# Patient Record
Sex: MALE | Race: WHITE | NOT HISPANIC OR LATINO | URBAN - METROPOLITAN AREA
[De-identification: names, ages, dates, MRNs, and addresses within clinical notes are randomized per-mention and may not be internally consistent; named-entity substitution may affect disease eponyms.]

---

## 2020-10-07 ENCOUNTER — INPATIENT (INPATIENT)
Facility: HOSPITAL | Age: 75
LOS: 12 days | Discharge: HOME | DRG: 683 | End: 2020-10-20
Attending: INTERNAL MEDICINE | Admitting: INTERNAL MEDICINE
Payer: COMMERCIAL

## 2020-10-07 VITALS
OXYGEN SATURATION: 96 % | DIASTOLIC BLOOD PRESSURE: 65 MMHG | TEMPERATURE: 98 F | HEART RATE: 110 BPM | SYSTOLIC BLOOD PRESSURE: 93 MMHG | RESPIRATION RATE: 24 BRPM | WEIGHT: 194.01 LBS

## 2020-10-07 DIAGNOSIS — I25.10 ATHEROSCLEROTIC HEART DISEASE OF NATIVE CORONARY ARTERY WITHOUT ANGINA PECTORIS: ICD-10-CM

## 2020-10-07 DIAGNOSIS — E78.5 HYPERLIPIDEMIA, UNSPECIFIED: ICD-10-CM

## 2020-10-07 DIAGNOSIS — I48.91 UNSPECIFIED ATRIAL FIBRILLATION: ICD-10-CM

## 2020-10-07 DIAGNOSIS — N17.9 ACUTE KIDNEY FAILURE, UNSPECIFIED: ICD-10-CM

## 2020-10-07 DIAGNOSIS — I10 ESSENTIAL (PRIMARY) HYPERTENSION: ICD-10-CM

## 2020-10-07 LAB
ALBUMIN SERPL ELPH-MCNC: 4.6 G/DL — SIGNIFICANT CHANGE UP (ref 3.3–5)
ALP SERPL-CCNC: 69 U/L — SIGNIFICANT CHANGE UP (ref 40–120)
ALT FLD-CCNC: 11 U/L — SIGNIFICANT CHANGE UP (ref 10–45)
ANION GAP SERPL CALC-SCNC: 18 MMOL/L — HIGH (ref 5–17)
ANION GAP SERPL CALC-SCNC: 20 MMOL/L — HIGH (ref 5–17)
APPEARANCE UR: CLEAR — SIGNIFICANT CHANGE UP
APTT BLD: 27.9 SEC — SIGNIFICANT CHANGE UP (ref 27.5–35.5)
AST SERPL-CCNC: 11 U/L — SIGNIFICANT CHANGE UP (ref 10–40)
BASE EXCESS BLDV CALC-SCNC: -7 MMOL/L — LOW (ref -2–2)
BASOPHILS # BLD AUTO: 0.04 K/UL — SIGNIFICANT CHANGE UP (ref 0–0.2)
BASOPHILS NFR BLD AUTO: 0.4 % — SIGNIFICANT CHANGE UP (ref 0–2)
BILIRUB SERPL-MCNC: 0.6 MG/DL — SIGNIFICANT CHANGE UP (ref 0.2–1.2)
BILIRUB UR-MCNC: NEGATIVE — SIGNIFICANT CHANGE UP
BUN SERPL-MCNC: 100 MG/DL — HIGH (ref 7–23)
BUN SERPL-MCNC: 104 MG/DL — HIGH (ref 7–23)
CA-I SERPL-SCNC: 1.18 MMOL/L — SIGNIFICANT CHANGE UP (ref 1.12–1.3)
CALCIUM SERPL-MCNC: 9.2 MG/DL — SIGNIFICANT CHANGE UP (ref 8.4–10.5)
CALCIUM SERPL-MCNC: 9.6 MG/DL — SIGNIFICANT CHANGE UP (ref 8.4–10.5)
CHLORIDE BLDV-SCNC: 105 MMOL/L — SIGNIFICANT CHANGE UP (ref 96–108)
CHLORIDE SERPL-SCNC: 102 MMOL/L — SIGNIFICANT CHANGE UP (ref 96–108)
CHLORIDE SERPL-SCNC: 106 MMOL/L — SIGNIFICANT CHANGE UP (ref 96–108)
CK MB BLD-MCNC: 0.9 % — SIGNIFICANT CHANGE UP (ref 0–3.5)
CK MB CFR SERPL CALC: 4.4 NG/ML — SIGNIFICANT CHANGE UP (ref 0–6.7)
CK MB CFR SERPL CALC: 5.4 NG/ML — SIGNIFICANT CHANGE UP (ref 0–6.7)
CK SERPL-CCNC: 490 U/L — HIGH (ref 30–200)
CO2 BLDV-SCNC: 19 MMOL/L — LOW (ref 22–30)
CO2 SERPL-SCNC: 17 MMOL/L — LOW (ref 22–31)
CO2 SERPL-SCNC: 17 MMOL/L — LOW (ref 22–31)
COLOR SPEC: SIGNIFICANT CHANGE UP
CREAT SERPL-MCNC: 9.11 MG/DL — HIGH (ref 0.5–1.3)
CREAT SERPL-MCNC: 9.98 MG/DL — HIGH (ref 0.5–1.3)
DIFF PNL FLD: ABNORMAL
EOSINOPHIL # BLD AUTO: 0.36 K/UL — SIGNIFICANT CHANGE UP (ref 0–0.5)
EOSINOPHIL NFR BLD AUTO: 3.4 % — SIGNIFICANT CHANGE UP (ref 0–6)
GAS PNL BLDV: 140 MMOL/L — SIGNIFICANT CHANGE UP (ref 135–145)
GAS PNL BLDV: SIGNIFICANT CHANGE UP
GLUCOSE BLDV-MCNC: 133 MG/DL — HIGH (ref 70–99)
GLUCOSE SERPL-MCNC: 122 MG/DL — HIGH (ref 70–99)
GLUCOSE SERPL-MCNC: 144 MG/DL — HIGH (ref 70–99)
GLUCOSE UR QL: NEGATIVE — SIGNIFICANT CHANGE UP
HCO3 BLDV-SCNC: 18 MMOL/L — LOW (ref 21–29)
HCT VFR BLD CALC: 30.8 % — LOW (ref 39–50)
HCT VFR BLDA CALC: 31 % — LOW (ref 39–50)
HGB BLD CALC-MCNC: 10.2 G/DL — LOW (ref 13–17)
HGB BLD-MCNC: 9.9 G/DL — LOW (ref 13–17)
IMM GRANULOCYTES NFR BLD AUTO: 0.7 % — SIGNIFICANT CHANGE UP (ref 0–1.5)
INR BLD: 1.02 RATIO — SIGNIFICANT CHANGE UP (ref 0.88–1.16)
KETONES UR-MCNC: NEGATIVE — SIGNIFICANT CHANGE UP
LACTATE BLDV-MCNC: 2.9 MMOL/L — HIGH (ref 0.7–2)
LEUKOCYTE ESTERASE UR-ACNC: ABNORMAL
LIDOCAIN IGE QN: 76 U/L — HIGH (ref 7–60)
LYMPHOCYTES # BLD AUTO: 0.91 K/UL — LOW (ref 1–3.3)
LYMPHOCYTES # BLD AUTO: 8.5 % — LOW (ref 13–44)
MAGNESIUM SERPL-MCNC: 2.1 MG/DL — SIGNIFICANT CHANGE UP (ref 1.6–2.6)
MCHC RBC-ENTMCNC: 28.3 PG — SIGNIFICANT CHANGE UP (ref 27–34)
MCHC RBC-ENTMCNC: 32.1 GM/DL — SIGNIFICANT CHANGE UP (ref 32–36)
MCV RBC AUTO: 88 FL — SIGNIFICANT CHANGE UP (ref 80–100)
MONOCYTES # BLD AUTO: 0.59 K/UL — SIGNIFICANT CHANGE UP (ref 0–0.9)
MONOCYTES NFR BLD AUTO: 5.5 % — SIGNIFICANT CHANGE UP (ref 2–14)
NEUTROPHILS # BLD AUTO: 8.68 K/UL — HIGH (ref 1.8–7.4)
NEUTROPHILS NFR BLD AUTO: 81.5 % — HIGH (ref 43–77)
NITRITE UR-MCNC: NEGATIVE — SIGNIFICANT CHANGE UP
NRBC # BLD: 0 /100 WBCS — SIGNIFICANT CHANGE UP (ref 0–0)
NT-PROBNP SERPL-SCNC: 2184 PG/ML — HIGH (ref 0–300)
PCO2 BLDV: 36 MMHG — SIGNIFICANT CHANGE UP (ref 35–50)
PH BLDV: 7.32 — LOW (ref 7.35–7.45)
PH UR: 6 — SIGNIFICANT CHANGE UP (ref 5–8)
PHOSPHATE SERPL-MCNC: 5.4 MG/DL — HIGH (ref 2.5–4.5)
PLATELET # BLD AUTO: 277 K/UL — SIGNIFICANT CHANGE UP (ref 150–400)
PO2 BLDV: 22 MMHG — LOW (ref 25–45)
POTASSIUM BLDV-SCNC: 3.9 MMOL/L — SIGNIFICANT CHANGE UP (ref 3.5–5.3)
POTASSIUM SERPL-MCNC: 3.9 MMOL/L — SIGNIFICANT CHANGE UP (ref 3.5–5.3)
POTASSIUM SERPL-MCNC: 4 MMOL/L — SIGNIFICANT CHANGE UP (ref 3.5–5.3)
POTASSIUM SERPL-SCNC: 3.9 MMOL/L — SIGNIFICANT CHANGE UP (ref 3.5–5.3)
POTASSIUM SERPL-SCNC: 4 MMOL/L — SIGNIFICANT CHANGE UP (ref 3.5–5.3)
PROT SERPL-MCNC: 7.8 G/DL — SIGNIFICANT CHANGE UP (ref 6–8.3)
PROT UR-MCNC: ABNORMAL
PROTHROM AB SERPL-ACNC: 12.2 SEC — SIGNIFICANT CHANGE UP (ref 10.6–13.6)
RBC # BLD: 3.5 M/UL — LOW (ref 4.2–5.8)
RBC # FLD: 12 % — SIGNIFICANT CHANGE UP (ref 10.3–14.5)
SAO2 % BLDV: 31 % — LOW (ref 67–88)
SARS-COV-2 RNA SPEC QL NAA+PROBE: SIGNIFICANT CHANGE UP
SODIUM SERPL-SCNC: 139 MMOL/L — SIGNIFICANT CHANGE UP (ref 135–145)
SODIUM SERPL-SCNC: 141 MMOL/L — SIGNIFICANT CHANGE UP (ref 135–145)
SP GR SPEC: 1.01 — SIGNIFICANT CHANGE UP (ref 1.01–1.02)
TROPONIN T, HIGH SENSITIVITY RESULT: 84 NG/L — HIGH (ref 0–51)
TROPONIN T, HIGH SENSITIVITY RESULT: 87 NG/L — HIGH (ref 0–51)
TSH SERPL-MCNC: 1.9 UIU/ML — SIGNIFICANT CHANGE UP (ref 0.27–4.2)
UROBILINOGEN FLD QL: NEGATIVE — SIGNIFICANT CHANGE UP
WBC # BLD: 10.65 K/UL — HIGH (ref 3.8–10.5)
WBC # FLD AUTO: 10.65 K/UL — HIGH (ref 3.8–10.5)

## 2020-10-07 PROCEDURE — 76775 US EXAM ABDO BACK WALL LIM: CPT | Mod: 26

## 2020-10-07 PROCEDURE — 93010 ELECTROCARDIOGRAM REPORT: CPT

## 2020-10-07 PROCEDURE — 99291 CRITICAL CARE FIRST HOUR: CPT

## 2020-10-07 PROCEDURE — 74176 CT ABD & PELVIS W/O CONTRAST: CPT | Mod: 26

## 2020-10-07 PROCEDURE — 93308 TTE F-UP OR LMTD: CPT | Mod: 26

## 2020-10-07 PROCEDURE — 71045 X-RAY EXAM CHEST 1 VIEW: CPT | Mod: 26

## 2020-10-07 RX ORDER — HEPARIN SODIUM 5000 [USP'U]/ML
5000 INJECTION INTRAVENOUS; SUBCUTANEOUS EVERY 8 HOURS
Refills: 0 | Status: DISCONTINUED | OUTPATIENT
Start: 2020-10-07 | End: 2020-10-20

## 2020-10-07 RX ORDER — DILTIAZEM HCL 120 MG
10 CAPSULE, EXT RELEASE 24 HR ORAL ONCE
Refills: 0 | Status: DISCONTINUED | OUTPATIENT
Start: 2020-10-07 | End: 2020-10-07

## 2020-10-07 RX ORDER — LABETALOL HCL 100 MG
100 TABLET ORAL
Refills: 0 | Status: DISCONTINUED | OUTPATIENT
Start: 2020-10-07 | End: 2020-10-17

## 2020-10-07 RX ORDER — LABETALOL HCL 100 MG
100 TABLET ORAL ONCE
Refills: 0 | Status: COMPLETED | OUTPATIENT
Start: 2020-10-07 | End: 2020-10-07

## 2020-10-07 RX ORDER — SODIUM CHLORIDE 9 MG/ML
1000 INJECTION, SOLUTION INTRAVENOUS ONCE
Refills: 0 | Status: COMPLETED | OUTPATIENT
Start: 2020-10-07 | End: 2020-10-07

## 2020-10-07 RX ORDER — SODIUM CHLORIDE 9 MG/ML
500 INJECTION, SOLUTION INTRAVENOUS ONCE
Refills: 0 | Status: COMPLETED | OUTPATIENT
Start: 2020-10-07 | End: 2020-10-07

## 2020-10-07 RX ORDER — SODIUM BICARBONATE 1 MEQ/ML
0.21 SYRINGE (ML) INTRAVENOUS
Qty: 150 | Refills: 0 | Status: DISCONTINUED | OUTPATIENT
Start: 2020-10-07 | End: 2020-10-07

## 2020-10-07 RX ORDER — SODIUM CHLORIDE 9 MG/ML
1000 INJECTION, SOLUTION INTRAVENOUS
Refills: 0 | Status: DISCONTINUED | OUTPATIENT
Start: 2020-10-07 | End: 2020-10-08

## 2020-10-07 RX ORDER — TAMSULOSIN HYDROCHLORIDE 0.4 MG/1
0.4 CAPSULE ORAL AT BEDTIME
Refills: 0 | Status: DISCONTINUED | OUTPATIENT
Start: 2020-10-07 | End: 2020-10-20

## 2020-10-07 RX ORDER — FINASTERIDE 5 MG/1
5 TABLET, FILM COATED ORAL DAILY
Refills: 0 | Status: DISCONTINUED | OUTPATIENT
Start: 2020-10-07 | End: 2020-10-20

## 2020-10-07 RX ORDER — ONDANSETRON 8 MG/1
4 TABLET, FILM COATED ORAL ONCE
Refills: 0 | Status: COMPLETED | OUTPATIENT
Start: 2020-10-07 | End: 2020-10-07

## 2020-10-07 RX ADMIN — ONDANSETRON 4 MILLIGRAM(S): 8 TABLET, FILM COATED ORAL at 16:43

## 2020-10-07 RX ADMIN — SODIUM CHLORIDE 500 MILLILITER(S): 9 INJECTION, SOLUTION INTRAVENOUS at 15:00

## 2020-10-07 RX ADMIN — SODIUM CHLORIDE 1000 MILLILITER(S): 9 INJECTION, SOLUTION INTRAVENOUS at 12:51

## 2020-10-07 RX ADMIN — HEPARIN SODIUM 5000 UNIT(S): 5000 INJECTION INTRAVENOUS; SUBCUTANEOUS at 21:19

## 2020-10-07 RX ADMIN — FINASTERIDE 5 MILLIGRAM(S): 5 TABLET, FILM COATED ORAL at 21:19

## 2020-10-07 RX ADMIN — TAMSULOSIN HYDROCHLORIDE 0.4 MILLIGRAM(S): 0.4 CAPSULE ORAL at 21:19

## 2020-10-07 RX ADMIN — Medication 100 MILLIGRAM(S): at 15:23

## 2020-10-07 RX ADMIN — SODIUM CHLORIDE 50 MILLILITER(S): 9 INJECTION, SOLUTION INTRAVENOUS at 20:01

## 2020-10-07 NOTE — ED ADULT NURSE REASSESSMENT NOTE - NS ED NURSE REASSESS COMMENT FT1
Pt's HR sustained in the 90s & pt converted to NSR before administering Cardizem per order. Ultrasound team at bedside performing ultrasound. Margie DE LEÓN aware & recommends holding Cardizem at this time. Will continue to reassess.

## 2020-10-07 NOTE — ED PROVIDER NOTE - CLINICAL SUMMARY MEDICAL DECISION MAKING FREE TEXT BOX
Attending MD Israel: 75M with ho CAD sp PCI, HTN, BPH presenting for evaluation of renal failure. Patient arrives in rapid afib/flutter to as high as 180s, BPs wnl though and patient perfusing well clinically. POCUS with urinary retention and severe b/l hydro, gaston placed, likely acute on chronic obstructive uropathy. Will first await lab work, address hyperK if present, then pursue rate control thereafter

## 2020-10-07 NOTE — ED ADULT NURSE REASSESSMENT NOTE - NS ED NURSE REASSESS COMMENT FT1
Report received from Lilia ALARCON. Lilia states that she placed Garcia catheter per order. Site WDL & currently draining clear, yellow urine. Pt reports feeling relief. Will continue to reassess.

## 2020-10-07 NOTE — H&P ADULT - PROBLEM SELECTOR PLAN 2
Converted to sinus in the ed with iv hydration.   Continue to monitor on tele for now.    Cards consult called, follow up recs.

## 2020-10-07 NOTE — H&P ADULT - NSICDXPASTMEDICALHX_GEN_ALL_CORE_FT
PAST MEDICAL HISTORY:  CAD (coronary artery disease)     HLD (hyperlipidemia)     HTN (hypertension)

## 2020-10-07 NOTE — ED PROVIDER NOTE - ATTENDING CONTRIBUTION TO CARE
Attending MD Israel:   I personally have seen and examined this patient.  Physician assistant note reviewed and agree on plan of care and except where noted.  See HPI, PE, and MDM for details.

## 2020-10-07 NOTE — H&P ADULT - HISTORY OF PRESENT ILLNESS
Ear Cerumen Removal Procedure Note      Verbal order was obtained from Sathish Hernandez MD to proceed with bilateral Ear Cerumen Removal for patient.    Procedure Note:  Otoscopic evaluation of cerumen was visualized by this caregiver.  The ear canal was irrigated with a solution of 1 part 3% hydrogen peroxide to 4 parts \"body temperature\" water until all cerumen was removed and the ear canal was clear.    The patient tolerated the procedure well, without difficulty.  Provider was updated.     75yr Male with  pmhx cad s/p stent 13 years ago, htn, BPH and nephrolithiasis 28 yrs ago s/p lithotripsy sent in by his PCP for abnormal labs.   Patient states that he is feels weak and tired, difficulty urinating over the past few weeks, associated with poor appetite and nausea. Denies dysuria, abdominal pain, fevers. No hx vomiting/ abdominal pain. Patient was on Flomax which was discontinued 3 months ago, started on Ditropan last week by his urologist. Patient went to see his pmd and was referred to ed for abnormal blood work- elevated Creatinine.     In the ed patient noted to have creatinine 9.98 in ED. US, CT abdomen shows  performed by ED showed significant b/l hydro and urinary retention with enlarged Prostate. . Garcia placed drained 500cc urine. Urology consulted- recommend conservative management.   In the ed patient noted to have A.fib with rvr- converted to sinus with fluids.   night and urinary incontinence. He was previously on flomax but stopped 3 months ago. Placed on finasteride by PMD, and more recently on ditropan by urologist Dr. Mclaughlin

## 2020-10-07 NOTE — H&P ADULT - NSICDXFAMILYHX_GEN_ALL_CORE_FT
No pertinent family history in first degree relatives

## 2020-10-07 NOTE — ED ADULT NURSE REASSESSMENT NOTE - NS ED NURSE REASSESS COMMENT FT1
Small amount of blood noticed in Garcia bag. Pt denies dizziness or lightheadedness at this time. Adrián ELIZONDO (#[id number]) aware & recommends continuing to monitor. Will continue to reassess.

## 2020-10-07 NOTE — ED ADULT NURSE NOTE - NSIMPLEMENTINTERV_GEN_ALL_ED
Implemented All Fall Risk Interventions:  [place] to call system. Call bell, personal items and telephone within reach. Instruct patient to call for assistance. Room bathroom lighting operational. Non-slip footwear when patient is off stretcher. Physically safe environment: no spills, clutter or unnecessary equipment. Stretcher in lowest position, wheels locked, appropriate side rails in place. Provide visual cue, wrist band, yellow gown, etc. Monitor gait and stability. Monitor for mental status changes and reorient to person, place, and time. Review medications for side effects contributing to fall risk. Reinforce activity limits and safety measures with patient and family.

## 2020-10-07 NOTE — ED PROVIDER NOTE - CARE PLAN
Principal Discharge DX:	Rapid atrial fibrillation  Secondary Diagnosis:	Acute kidney injury superimposed on CKD

## 2020-10-07 NOTE — ED PROVIDER NOTE - OBJECTIVE STATEMENT
75 y.o. male history of CAD with a stent not currently on blood thinners sent in from his PCP for abnormal labs.  Pt has not been feeling well over the past week or so.  Nothing specific, no complaints of pain ,fevers, or chills.  Does report decreased PO and decreased BM and urine output.  No belly pain, no chest pain / palp/ or SoB.  No other complaints, nothing makes it better or worse.

## 2020-10-07 NOTE — ED PROCEDURE NOTE - PROCEDURE ADDITIONAL DETAILS
Emergency Department Focused Ultrasound performed at patient's bedside for educational purposes. The study will have a follow up study performed or was performed in the direct supervision of an ultrasound trained attending.
Emergency Department Focused Ultrasound performed at patient's bedside.  The complete report can be found in PACS.
POCUS: Emergency Department Focused Ultrasound performed at patient's bedside.  The complete report may be available in PACS, see below for findings.

## 2020-10-07 NOTE — ED PROVIDER NOTE - PROGRESS NOTE DETAILS
Spoke with pt and son, pt has been non compliant with his medications for over the past 3 days.  Takes Labetalol 100 mg 2 x a day will give his normal dose now.  Margie Attending MD Israel: converted to SR, UOP ~1300. Urology has seen patient, agrees with gaston, re-evaluate hydro in a couple days to see if resolves with gaston decompression.

## 2020-10-07 NOTE — CONSULT NOTE ADULT - ASSESSMENT
A/P: Pt is a 75 year old male presenting to ED for VENUS (Cr 9.98), found to have severe b/l hydroureteronephrosis likely 2/2 urinary retention. Gaston in place, draining clear urine.     - Maintain gaston catheter  - monitor for post obstructive diuresis, if UO>200cc/hr for 2+hours, trend q6hr BMP and replete electrolytes as needed, hydrate to thirst  - trend BMP/Cr  - STOP oxybutynin (can contribute to urinary retention)  - continue finasteride  - start flomax  - would repeat US in 2-3 days if creatinine improving  - discussed with Dr. Lam

## 2020-10-07 NOTE — ED ADULT NURSE REASSESSMENT NOTE - NS ED NURSE REASSESS COMMENT FT1
Pt verbalized understanding that he is admitted & awaiting a bed. Pt resting comfortably without complaints & does not appear to be in any acute distress at this time with VSS & family at bedside. Will continue to reassess.

## 2020-10-07 NOTE — H&P ADULT - RS GEN PE MLT RESP DETAILS PC
clear to auscultation bilaterally/breath sounds equal/no rhonchi/no rales/airway patent/no chest wall tenderness/no intercostal retractions/good air movement

## 2020-10-07 NOTE — ED ADULT NURSE REASSESSMENT NOTE - NS ED NURSE REASSESS COMMENT FT1
Pt's HR elevated to 170s. Pt asymptomatic & denies lightheadedness, dizziness, palpitations, CP, & SOB. Pr received 1L LR. Margie DE LEÓN aware & states that no intervention is necessary at this time. Will continue to reassess. Pt's HR elevated to 170s. Pt asymptomatic & denies lightheadedness, dizziness, palpitations, CP, & SOB. Pr received 1L LR. Margie DE LEÓN aware & states that he will put orders in. Will continue to reassess.

## 2020-10-07 NOTE — H&P ADULT - PROBLEM SELECTOR PLAN 1
Bilateral moderate to severe hydroureteronephrosis, post obstructive Uropathy.    Continue with gaston, add Flomax. Continue with Finasteride.   Renal, urology consults appreciated.

## 2020-10-07 NOTE — CONSULT NOTE ADULT - ASSESSMENT
75 y.o. male history of CAD with a stent not currently on blood thinners sent in from his PCP for abnormal labs.  Pt has not been feeling well over the past week or so. c/o general weakness, poor appetite, n/v x1 week. denied fevers, or chills.  Does report decreased PO and decreased BM and urine output. US performed by ED showed significant b/l hydro and urinary retention. Gaston placed drained 500cc urine. seen by  s/p gaston  nephrology consulted for VENUS    VENUS  likely sec to obstructive uropathy  s/p gaston non oliguric  CT with b/l hydro  seen by   monitor bmp Q6  if renal function not improving or worsening of uremic symptoms patient likely would require HD  consent for hd obtained in chart, risk and benefit explained to patient. all questions answered.     B/l hydronephrosis   f/u   s/p gaston    anemia  monitor   75 y.o. male history of CAD with a stent not currently on blood thinners sent in from his PCP for abnormal labs.  Pt has not been feeling well over the past week or so. c/o general weakness, poor appetite, n/v x1 week. denied fevers, or chills.  Does report decreased PO and decreased BM and urine output. US performed by ED showed significant b/l hydro and urinary retention. Gaston placed drained 500cc urine. seen by  s/p gaston  nephrology consulted for VENUS    VENUS  likely sec to obstructive uropathy  s/p gaston non oliguric  CT with b/l hydro  seen by   monitor bmp Q6  if renal function not improving or worsening of uremic symptoms patient likely would require HD  consent for hd obtained in chart, risk and benefit explained to patient. all questions answered.     B/l hydronephrosis   f/u   s/p gaston    Proteinruria  in setting of obstruction/ gaston  Repeat UA  Monitor at present

## 2020-10-07 NOTE — ED ADULT NURSE NOTE - OBJECTIVE STATEMENT
75y M PMHx CAD s/p stent presents to ED from PCP for abnormal labs. Pt states that he has been having trouble urinating & having BMs for the past week with overall decreased output. Reports that he has not been able to tolerate PO intake for 48 hr & gets nauseous when he tries to eat. Denies CP, SOB, H/A, vomiting/diarrhea, abdominal pain, f/c, & dizziness. A&Ox4. No respiratory distress noted on RA. EKG obtained & cardiac monitor applied. Abdomen soft, nondistended, & nontender to palpation. Skin warm, dry, & intact. MAEx4. No LE edema noted on exam. Pt resting comfortably with no complaints at this time. Pt's bed in the lowest position & explained POC to pt & family members. Will continue to reassess.

## 2020-10-08 LAB
ANION GAP SERPL CALC-SCNC: 16 MMOL/L — SIGNIFICANT CHANGE UP (ref 5–17)
ANION GAP SERPL CALC-SCNC: 17 MMOL/L — SIGNIFICANT CHANGE UP (ref 5–17)
ANION GAP SERPL CALC-SCNC: 19 MMOL/L — HIGH (ref 5–17)
BASOPHILS # BLD AUTO: 0.02 K/UL — SIGNIFICANT CHANGE UP (ref 0–0.2)
BASOPHILS NFR BLD AUTO: 0.2 % — SIGNIFICANT CHANGE UP (ref 0–2)
BUN SERPL-MCNC: 101 MG/DL — HIGH (ref 7–23)
BUN SERPL-MCNC: 102 MG/DL — HIGH (ref 7–23)
BUN SERPL-MCNC: 99 MG/DL — HIGH (ref 7–23)
CALCIUM SERPL-MCNC: 8.4 MG/DL — SIGNIFICANT CHANGE UP (ref 8.4–10.5)
CALCIUM SERPL-MCNC: 8.5 MG/DL — SIGNIFICANT CHANGE UP (ref 8.4–10.5)
CALCIUM SERPL-MCNC: 8.6 MG/DL — SIGNIFICANT CHANGE UP (ref 8.4–10.5)
CHLORIDE SERPL-SCNC: 103 MMOL/L — SIGNIFICANT CHANGE UP (ref 96–108)
CHLORIDE SERPL-SCNC: 104 MMOL/L — SIGNIFICANT CHANGE UP (ref 96–108)
CHLORIDE SERPL-SCNC: 105 MMOL/L — SIGNIFICANT CHANGE UP (ref 96–108)
CK MB BLD-MCNC: 0.9 % — SIGNIFICANT CHANGE UP (ref 0–3.5)
CK MB CFR SERPL CALC: 3.4 NG/ML — SIGNIFICANT CHANGE UP (ref 0–6.7)
CK SERPL-CCNC: 387 U/L — HIGH (ref 30–200)
CO2 SERPL-SCNC: 15 MMOL/L — LOW (ref 22–31)
CO2 SERPL-SCNC: 17 MMOL/L — LOW (ref 22–31)
CO2 SERPL-SCNC: 18 MMOL/L — LOW (ref 22–31)
CREAT SERPL-MCNC: 8.87 MG/DL — HIGH (ref 0.5–1.3)
CREAT SERPL-MCNC: 8.91 MG/DL — HIGH (ref 0.5–1.3)
CREAT SERPL-MCNC: 8.98 MG/DL — HIGH (ref 0.5–1.3)
CULTURE RESULTS: NO GROWTH — SIGNIFICANT CHANGE UP
EOSINOPHIL # BLD AUTO: 0.43 K/UL — SIGNIFICANT CHANGE UP (ref 0–0.5)
EOSINOPHIL NFR BLD AUTO: 5.1 % — SIGNIFICANT CHANGE UP (ref 0–6)
GLUCOSE SERPL-MCNC: 107 MG/DL — HIGH (ref 70–99)
GLUCOSE SERPL-MCNC: 113 MG/DL — HIGH (ref 70–99)
GLUCOSE SERPL-MCNC: 148 MG/DL — HIGH (ref 70–99)
HCT VFR BLD CALC: 26.2 % — LOW (ref 39–50)
HCV AB S/CO SERPL IA: 0.13 S/CO — SIGNIFICANT CHANGE UP (ref 0–0.99)
HCV AB SERPL-IMP: SIGNIFICANT CHANGE UP
HGB BLD-MCNC: 8.4 G/DL — LOW (ref 13–17)
IMM GRANULOCYTES NFR BLD AUTO: 0.7 % — SIGNIFICANT CHANGE UP (ref 0–1.5)
LYMPHOCYTES # BLD AUTO: 0.82 K/UL — LOW (ref 1–3.3)
LYMPHOCYTES # BLD AUTO: 9.8 % — LOW (ref 13–44)
MAGNESIUM SERPL-MCNC: 1.8 MG/DL — SIGNIFICANT CHANGE UP (ref 1.6–2.6)
MCHC RBC-ENTMCNC: 29.4 PG — SIGNIFICANT CHANGE UP (ref 27–34)
MCHC RBC-ENTMCNC: 32.1 GM/DL — SIGNIFICANT CHANGE UP (ref 32–36)
MCV RBC AUTO: 91.6 FL — SIGNIFICANT CHANGE UP (ref 80–100)
MONOCYTES # BLD AUTO: 0.64 K/UL — SIGNIFICANT CHANGE UP (ref 0–0.9)
MONOCYTES NFR BLD AUTO: 7.6 % — SIGNIFICANT CHANGE UP (ref 2–14)
NEUTROPHILS # BLD AUTO: 6.42 K/UL — SIGNIFICANT CHANGE UP (ref 1.8–7.4)
NEUTROPHILS NFR BLD AUTO: 76.6 % — SIGNIFICANT CHANGE UP (ref 43–77)
NRBC # BLD: 0 /100 WBCS — SIGNIFICANT CHANGE UP (ref 0–0)
PLATELET # BLD AUTO: 166 K/UL — SIGNIFICANT CHANGE UP (ref 150–400)
POTASSIUM SERPL-MCNC: 3.8 MMOL/L — SIGNIFICANT CHANGE UP (ref 3.5–5.3)
POTASSIUM SERPL-MCNC: 4 MMOL/L — SIGNIFICANT CHANGE UP (ref 3.5–5.3)
POTASSIUM SERPL-MCNC: 4.4 MMOL/L — SIGNIFICANT CHANGE UP (ref 3.5–5.3)
POTASSIUM SERPL-SCNC: 3.8 MMOL/L — SIGNIFICANT CHANGE UP (ref 3.5–5.3)
POTASSIUM SERPL-SCNC: 4 MMOL/L — SIGNIFICANT CHANGE UP (ref 3.5–5.3)
POTASSIUM SERPL-SCNC: 4.4 MMOL/L — SIGNIFICANT CHANGE UP (ref 3.5–5.3)
RBC # BLD: 2.86 M/UL — LOW (ref 4.2–5.8)
RBC # FLD: 12.1 % — SIGNIFICANT CHANGE UP (ref 10.3–14.5)
SARS-COV-2 IGG SERPL QL IA: NEGATIVE — SIGNIFICANT CHANGE UP
SARS-COV-2 IGM SERPL IA-ACNC: 0.1 INDEX — SIGNIFICANT CHANGE UP
SODIUM SERPL-SCNC: 136 MMOL/L — SIGNIFICANT CHANGE UP (ref 135–145)
SODIUM SERPL-SCNC: 138 MMOL/L — SIGNIFICANT CHANGE UP (ref 135–145)
SODIUM SERPL-SCNC: 140 MMOL/L — SIGNIFICANT CHANGE UP (ref 135–145)
SPECIMEN SOURCE: SIGNIFICANT CHANGE UP
TROPONIN T, HIGH SENSITIVITY RESULT: 77 NG/L — HIGH (ref 0–51)
TSH SERPL-MCNC: 1.01 UIU/ML — SIGNIFICANT CHANGE UP (ref 0.27–4.2)
WBC # BLD: 8.39 K/UL — SIGNIFICANT CHANGE UP (ref 3.8–10.5)
WBC # FLD AUTO: 8.39 K/UL — SIGNIFICANT CHANGE UP (ref 3.8–10.5)

## 2020-10-08 PROCEDURE — 93306 TTE W/DOPPLER COMPLETE: CPT | Mod: 26

## 2020-10-08 RX ORDER — SODIUM CHLORIDE 9 MG/ML
1000 INJECTION INTRAMUSCULAR; INTRAVENOUS; SUBCUTANEOUS
Refills: 0 | Status: DISCONTINUED | OUTPATIENT
Start: 2020-10-08 | End: 2020-10-09

## 2020-10-08 RX ORDER — INFLUENZA VIRUS VACCINE 15; 15; 15; 15 UG/.5ML; UG/.5ML; UG/.5ML; UG/.5ML
0.5 SUSPENSION INTRAMUSCULAR ONCE
Refills: 0 | Status: DISCONTINUED | OUTPATIENT
Start: 2020-10-08 | End: 2020-10-20

## 2020-10-08 RX ADMIN — HEPARIN SODIUM 5000 UNIT(S): 5000 INJECTION INTRAVENOUS; SUBCUTANEOUS at 05:18

## 2020-10-08 RX ADMIN — Medication 100 MILLIGRAM(S): at 05:18

## 2020-10-08 RX ADMIN — HEPARIN SODIUM 5000 UNIT(S): 5000 INJECTION INTRAVENOUS; SUBCUTANEOUS at 22:05

## 2020-10-08 RX ADMIN — FINASTERIDE 5 MILLIGRAM(S): 5 TABLET, FILM COATED ORAL at 13:25

## 2020-10-08 RX ADMIN — HEPARIN SODIUM 5000 UNIT(S): 5000 INJECTION INTRAVENOUS; SUBCUTANEOUS at 13:25

## 2020-10-08 RX ADMIN — Medication 100 MILLIGRAM(S): at 17:55

## 2020-10-08 RX ADMIN — TAMSULOSIN HYDROCHLORIDE 0.4 MILLIGRAM(S): 0.4 CAPSULE ORAL at 22:05

## 2020-10-08 RX ADMIN — SODIUM CHLORIDE 75 MILLILITER(S): 9 INJECTION INTRAMUSCULAR; INTRAVENOUS; SUBCUTANEOUS at 10:50

## 2020-10-08 NOTE — PROGRESS NOTE ADULT - SUBJECTIVE AND OBJECTIVE BOX
HPI:  75yr Male with  pmhx cad s/p stent 13 years ago, htn, BPH and nephrolithiasis 28 yrs ago s/p lithotripsy sent in by his PCP for abnormal labs.   Patient states that he is feels weak and tired, difficulty urinating over the past few weeks, associated with poor appetite and nausea. Denies dysuria, abdominal pain, fevers. No hx vomiting/ abdominal pain. Patient was on Flomax which was discontinued 3 months ago, started on Ditropan last week by his urologist. Patient went to see his pmd and was referred to ed for abnormal blood work- elevated Creatinine.     In the ed patient noted to have creatinine 9.98 in ED. US, CT abdomen shows  performed by ED showed significant b/l hydro and urinary retention with enlarged Prostate. . Garcia placed drained 500cc urine. Urology consulted- recommend conservative management.   In the ed patient noted to have A.fib with rvr- converted to sinus with fluids.   night and urinary incontinence. He was previously on flomax but stopped 3 months ago. Placed on finasteride by PMD, and more recently on ditropan by urologist Dr. Mclaughlin (07 Oct 2020 18:02)      Patient is a 75y old  Male who presents with a chief complaint of Patient was sent by his pmd for abnormal labs (08 Oct 2020 10:50)      SUBJECTIVE / OVERNIGHT EVENTS: Patient feels much improved.     MEDICATIONS  (STANDING):  finasteride 5 milliGRAM(s) Oral daily  heparin   Injectable 5000 Unit(s) SubCutaneous every 8 hours  influenza   Vaccine 0.5 milliLiter(s) IntraMuscular once  labetalol 100 milliGRAM(s) Oral two times a day  sodium chloride 0.9%. 1000 milliLiter(s) (75 mL/Hr) IV Continuous <Continuous>  tamsulosin 0.4 milliGRAM(s) Oral at bedtime    MEDICATIONS  (PRN):      CAPILLARY BLOOD GLUCOSE        I&O's Summary    07 Oct 2020 07:01  -  08 Oct 2020 07:00  --------------------------------------------------------  IN: 520 mL / OUT: 1675 mL / NET: -1155 mL    08 Oct 2020 07:01  -  08 Oct 2020 19:07  --------------------------------------------------------  IN: 1080 mL / OUT: 400 mL / NET: 680 mL      T(C): 36.5 (10-08-20 @ 16:20), Max: 36.6 (10-08-20 @ 12:21)  HR: 73 (10-08-20 @ 16:20) (73 - 75)  BP: 130/70 (10-08-20 @ 16:20) (117/74 - 130/70)  RR: 18 (10-08-20 @ 16:20) (18 - 18)  SpO2: 97% (10-08-20 @ 16:20) (96% - 97%)    PHYSICAL EXAM:  GENERAL: NAD, well-developed  HEAD:  Atraumatic, Normocephalic  EYES: EOMI, PERRLA, conjunctiva and sclera clear  NECK: Supple, No JVD  CHEST/LUNG: Clear to auscultation bilaterally; No wheeze  HEART: Regular rate and rhythm; No murmurs, rubs, or gallops  ABDOMEN: Soft, Nontender, Nondistended; Bowel sounds present  EXTREMITIES:  2+ Peripheral Pulses, No clubbing, cyanosis, or edema  PSYCH: AAOx3  NEUROLOGY: non-focal  SKIN: No rashes or lesions    LABS:                        8.4    8.39  )-----------( 166      ( 08 Oct 2020 07:15 )             26.2     10-08    140  |  105  |  99<H>  ----------------------------<  148<H>  3.8   |  18<L>  |  8.87<H>    Ca    8.5      08 Oct 2020 14:44  Phos  5.4     10-07  Mg     1.8     10-08    TPro  7.8  /  Alb  4.6  /  TBili  0.6  /  DBili  x   /  AST  11  /  ALT  11  /  AlkPhos  69  10-07    PT/INR - ( 07 Oct 2020 12:31 )   PT: 12.2 sec;   INR: 1.02 ratio         PTT - ( 07 Oct 2020 12:31 )  PTT:27.9 sec  CARDIAC MARKERS ( 08 Oct 2020 02:00 )  x     / x     / 387 U/L / x     / 3.4 ng/mL  CARDIAC MARKERS ( 07 Oct 2020 20:12 )  x     / x     / 490 U/L / x     / 4.4 ng/mL  CARDIAC MARKERS ( 07 Oct 2020 12:31 )  x     / x     / x     / x     / 5.4 ng/mL      Urinalysis Basic - ( 07 Oct 2020 12:52 )    Color: Light Yellow / Appearance: Clear / S.013 / pH: x  Gluc: x / Ketone: Negative  / Bili: Negative / Urobili: Negative   Blood: x / Protein: 30 mg/dL / Nitrite: Negative   Leuk Esterase: Moderate / RBC: x / WBC 15 /HPF   Sq Epi: x / Non Sq Epi: x / Bacteria: x        RADIOLOGY & ADDITIONAL TESTS:    Imaging Personally Reviewed:    Consultant(s) Notes Reviewed:  renal, cards     Care Discussed with Consultants/Other Providers:

## 2020-10-08 NOTE — PROGRESS NOTE ADULT - SUBJECTIVE AND OBJECTIVE BOX
UROLOGY DAILY PROGRESS NOTE:     Subjective:    Feels well. Tolerating gaston.  No flank pain.    Objective:    NAD, awake and alert  Respirations nonlabored  Abdomen soft, nontender, nondistended  No guarding or rebound tenderness   Urine clear    MEDICATIONS  (STANDING):  finasteride 5 milliGRAM(s) Oral daily  heparin   Injectable 5000 Unit(s) SubCutaneous every 8 hours  influenza   Vaccine 0.5 milliLiter(s) IntraMuscular once  labetalol 100 milliGRAM(s) Oral two times a day  lactated ringers. 1000 milliLiter(s) (50 mL/Hr) IV Continuous <Continuous>  sodium chloride 0.9%. 1000 milliLiter(s) (75 mL/Hr) IV Continuous <Continuous>  tamsulosin 0.4 milliGRAM(s) Oral at bedtime    MEDICATIONS  (PRN):      Vital Signs Last 24 Hrs  T(C): 36.7 (08 Oct 2020 04:24), Max: 36.9 (07 Oct 2020 11:56)  T(F): 98.1 (08 Oct 2020 04:24), Max: 98.5 (07 Oct 2020 11:56)  HR: 75 (08 Oct 2020 04:24) (75 - 177)  BP: 144/86 (08 Oct 2020 04:24) (93/65 - 162/92)  BP(mean): 115 (07 Oct 2020 18:02) (115 - 115)  RR: 18 (08 Oct 2020 04:24) (16 - 24)  SpO2: 96% (08 Oct 2020 04:24) (96% - 100%)    I&O's Detail    07 Oct 2020 07:01  -  08 Oct 2020 07:00  --------------------------------------------------------  IN:    Lactated Ringers: 400 mL    Oral Fluid: 120 mL  Total IN: 520 mL    OUT:    Indwelling Catheter - Urethral (mL): 900 mL    Voided (mL): 775 mL  Total OUT: 1675 mL    Total NET: -1155 mL          Daily     Daily Weight in k.6 (08 Oct 2020 04:24)    LABS:                        8.4    8.39  )-----------( 166      ( 08 Oct 2020 07:15 )             26.2     10-08    138  |  104  |  101<H>  ----------------------------<  107<H>  4.0   |  15<L>  |  8.91<H>    Ca    8.6      08 Oct 2020 07:10  Phos  5.4     10-07  Mg     2.1     10-07    TPro  7.8  /  Alb  4.6  /  TBili  0.6  /  DBili  x   /  AST  11  /  ALT  11  /  AlkPhos  69  10-07    PT/INR - ( 07 Oct 2020 12:31 )   PT: 12.2 sec;   INR: 1.02 ratio         PTT - ( 07 Oct 2020 12:31 )  PTT:27.9 sec  Urinalysis Basic - ( 07 Oct 2020 12:52 )    Color: Light Yellow / Appearance: Clear / S.013 / pH: x  Gluc: x / Ketone: Negative  / Bili: Negative / Urobili: Negative   Blood: x / Protein: 30 mg/dL / Nitrite: Negative   Leuk Esterase: Moderate / RBC: x / WBC 15 /HPF   Sq Epi: x / Non Sq Epi: x / Bacteria: x

## 2020-10-08 NOTE — PROGRESS NOTE ADULT - SUBJECTIVE AND OBJECTIVE BOX
[facility] [place] [facility]   MD GABO CAMEJO MD RUORU WONG, PA    TEL:  OFFICE: [phone]  DR KIM CELL: [phone]  JEROME DSOUZA CELL: [phone]  DR. SINGH CELL: [phone]  DR. JACOB CELL: [phone]    FROM 5 PM - 7 AM PLEASE CALL ANSWERING SERVICE: [phone]    RENAL FOLLOW UP NOTE--Date of Service 10-08-20 @ 09:51  --------------------------------------------------------------------------------  HPI:      Pt seen and examined at bedside.   Denies SOB, chest pain   sitting up having breakfast    PAST HISTORY  --------------------------------------------------------------------------------  No significant changes to PMH, PSH, FHx, SHx, unless otherwise noted    ALLERGIES & MEDICATIONS  --------------------------------------------------------------------------------  Allergies    penicillin (Rash)    Intolerances      Standing Inpatient Medications  finasteride 5 milliGRAM(s) Oral daily  heparin   Injectable 5000 Unit(s) SubCutaneous every 8 hours  influenza   Vaccine 0.5 milliLiter(s) IntraMuscular once  labetalol 100 milliGRAM(s) Oral two times a day  lactated ringers. 1000 milliLiter(s) IV Continuous <Continuous>  tamsulosin 0.4 milliGRAM(s) Oral at bedtime    PRN Inpatient Medications      REVIEW OF SYSTEMS  --------------------------------------------------------------------------------  General: no fever  CVS: no chest pain  RESP: no sob, no cough  ABD: no abdominal pain  MSK: no edema     VITALS/PHYSICAL EXAM  --------------------------------------------------------------------------------  T(C): 36.7 (10-08-20 @ 04:24), Max: 36.9 (10-07-20 @ 11:56)  HR: 75 (10-08-20 @ 04:24) (75 - 177)  BP: 144/86 (10-08-20 @ 04:24) (93/65 - 162/92)  RR: 18 (10-08-20 @ 04:24) (16 - 24)  SpO2: 96% (10-08-20 @ 04:24) (96% - 100%)  Wt(kg): --    Weight (kg): 88 (10-07-20 @ 18:02)      10-07-20 @ 07:01  -  10-08-20 @ 07:00  --------------------------------------------------------  IN: 520 mL / OUT: 1675 mL / NET: -1155 mL      Physical Exam:  	Gen: NAD  	HEENT: MMM  	Pulm: CTA B/L  	CV: S1S2  	Abd: Soft, +BS  	Ext: No LE edema B/L                      Neuro: Awake alert  	Skin: Warm and Dry   	Vascular access: no hd catheter          HEMALATHA gaston  LABS/STUDIES  --------------------------------------------------------------------------------              8.4    8.39  >-----------<  166      [10-08-20 @ 07:15]              26.2     138  |  104  |  101  ----------------------------<  107      [10-08-20 @ 07:10]  4.0   |  15  |  8.91        Ca     8.6     [10-08-20 @ 07:10]      Mg     2.1     [10-07-20 @ 12:31]      Phos  5.4     [10-07-20 @ 12:31]    TPro  7.8  /  Alb  4.6  /  TBili  0.6  /  DBili  x   /  AST  11  /  ALT  11  /  AlkPhos  69  [10-07-20 @ 12:31]    PT/INR: PT 12.2 , INR 1.02       [10-07-20 @ 12:31]  PTT: 27.9       [10-07-20 @ 12:31]          [10-08-20 @ 02:00]    Creatinine Trend:  SCr 8.91 [10-08 @ 07:10]  SCr 8.98 [10-08 @ 02:00]  SCr 9.11 [10-07 @ 20:12]  SCr 9.98 [10-07 @ 12:31]    Urinalysis - [10-07-20 @ 12:52]      Color Light Yellow / Appearance Clear / SG 1.013 / pH 6.0      Gluc Negative / Ketone Negative  / Bili Negative / Urobili Negative       Blood Small / Protein 30 mg/dL / Leuk Est Moderate / Nitrite Negative      RBC  / WBC 15 / Hyaline  / Gran  / Sq Epi  / Non Sq Epi  / Bacteria       TSH 1.90      [10-07-20 @ 14:41]

## 2020-10-08 NOTE — PROGRESS NOTE ADULT - ASSESSMENT
A/P: Pt is a 75 year old male presenting to ED for VENUS (Cr 9.98), found to have severe b/l hydroureteronephrosis likely 2/2 urinary retention.     - Continue gaston catheter to drainage  - Monitor strict outputs  - Trend SCr (slight improvement today)  - Monitor for post obstructive diuresis, if UO>200cc/hr for 2+hours, trend q6hr BMP and replete electrolytes as needed, hydrate to thirst  - Continue flomax/finasteride  - Plan on repeat ultrasound in 1-2 days

## 2020-10-08 NOTE — PROGRESS NOTE ADULT - ASSESSMENT
75 y.o. male history of CAD with a stent not currently on blood thinners sent in from his PCP for abnormal labs.  Pt has not been feeling well over the past week or so. c/o general weakness, poor appetite, n/v x1 week. denied fevers, or chills.  Does report decreased PO and decreased BM and urine output. US performed by ED showed significant b/l hydro and urinary retention. Gaston placed drained 500cc urine. seen by  s/p gaston  nephrology consulted for VENUS    VENUS  likely sec to obstructive uropathy  CT with b/l hydro s/p Gaston .  Pt non oliguric   Follow up Urology  Planned for repeat renal sonogram   Renal function with mild improvement. Monitor BMP at present   If  renal function does not improve  or patient with worsening  uremic symptoms patient would require HD  Consent for hd obtained in chart, risk and benefit explained to patient. All questions answered.     B/l hydronephrosis   CT with b/l hydro s/p Gaston .  Pt non oliguric   Follow up Urology  Planned for repeat renal sonogram     Proteinruria  in setting of obstruction/ gaston  Repeat UA  Monitor at present

## 2020-10-09 LAB
ANION GAP SERPL CALC-SCNC: 15 MMOL/L — SIGNIFICANT CHANGE UP (ref 5–17)
ANION GAP SERPL CALC-SCNC: 15 MMOL/L — SIGNIFICANT CHANGE UP (ref 5–17)
BLD GP AB SCN SERPL QL: NEGATIVE — SIGNIFICANT CHANGE UP
BUN SERPL-MCNC: 89 MG/DL — HIGH (ref 7–23)
BUN SERPL-MCNC: 93 MG/DL — HIGH (ref 7–23)
CALCIUM SERPL-MCNC: 7.4 MG/DL — LOW (ref 8.4–10.5)
CALCIUM SERPL-MCNC: 7.4 MG/DL — LOW (ref 8.4–10.5)
CHLORIDE SERPL-SCNC: 109 MMOL/L — HIGH (ref 96–108)
CHLORIDE SERPL-SCNC: 109 MMOL/L — HIGH (ref 96–108)
CO2 SERPL-SCNC: 16 MMOL/L — LOW (ref 22–31)
CO2 SERPL-SCNC: 17 MMOL/L — LOW (ref 22–31)
CREAT SERPL-MCNC: 7.37 MG/DL — HIGH (ref 0.5–1.3)
CREAT SERPL-MCNC: 7.94 MG/DL — HIGH (ref 0.5–1.3)
GLUCOSE SERPL-MCNC: 104 MG/DL — HIGH (ref 70–99)
GLUCOSE SERPL-MCNC: 114 MG/DL — HIGH (ref 70–99)
HCT VFR BLD CALC: 24.4 % — LOW (ref 39–50)
HCT VFR BLD CALC: 24.5 % — LOW (ref 39–50)
HGB BLD-MCNC: 7.9 G/DL — LOW (ref 13–17)
HGB BLD-MCNC: 8 G/DL — LOW (ref 13–17)
MCHC RBC-ENTMCNC: 29 PG — SIGNIFICANT CHANGE UP (ref 27–34)
MCHC RBC-ENTMCNC: 29.3 PG — SIGNIFICANT CHANGE UP (ref 27–34)
MCHC RBC-ENTMCNC: 32.4 GM/DL — SIGNIFICANT CHANGE UP (ref 32–36)
MCHC RBC-ENTMCNC: 32.7 GM/DL — SIGNIFICANT CHANGE UP (ref 32–36)
MCV RBC AUTO: 88.8 FL — SIGNIFICANT CHANGE UP (ref 80–100)
MCV RBC AUTO: 90.4 FL — SIGNIFICANT CHANGE UP (ref 80–100)
NRBC # BLD: 0 /100 WBCS — SIGNIFICANT CHANGE UP (ref 0–0)
NRBC # BLD: 0 /100 WBCS — SIGNIFICANT CHANGE UP (ref 0–0)
PLATELET # BLD AUTO: 128 K/UL — LOW (ref 150–400)
PLATELET # BLD AUTO: 152 K/UL — SIGNIFICANT CHANGE UP (ref 150–400)
POTASSIUM SERPL-MCNC: 3.4 MMOL/L — LOW (ref 3.5–5.3)
POTASSIUM SERPL-MCNC: 3.5 MMOL/L — SIGNIFICANT CHANGE UP (ref 3.5–5.3)
POTASSIUM SERPL-SCNC: 3.4 MMOL/L — LOW (ref 3.5–5.3)
POTASSIUM SERPL-SCNC: 3.5 MMOL/L — SIGNIFICANT CHANGE UP (ref 3.5–5.3)
RBC # BLD: 2.7 M/UL — LOW (ref 4.2–5.8)
RBC # BLD: 2.76 M/UL — LOW (ref 4.2–5.8)
RBC # FLD: 12.1 % — SIGNIFICANT CHANGE UP (ref 10.3–14.5)
RBC # FLD: 12.2 % — SIGNIFICANT CHANGE UP (ref 10.3–14.5)
RH IG SCN BLD-IMP: POSITIVE — SIGNIFICANT CHANGE UP
SODIUM SERPL-SCNC: 140 MMOL/L — SIGNIFICANT CHANGE UP (ref 135–145)
SODIUM SERPL-SCNC: 141 MMOL/L — SIGNIFICANT CHANGE UP (ref 135–145)
WBC # BLD: 6.92 K/UL — SIGNIFICANT CHANGE UP (ref 3.8–10.5)
WBC # BLD: 7.2 K/UL — SIGNIFICANT CHANGE UP (ref 3.8–10.5)
WBC # FLD AUTO: 6.92 K/UL — SIGNIFICANT CHANGE UP (ref 3.8–10.5)
WBC # FLD AUTO: 7.2 K/UL — SIGNIFICANT CHANGE UP (ref 3.8–10.5)

## 2020-10-09 PROCEDURE — 99232 SBSQ HOSP IP/OBS MODERATE 35: CPT | Mod: GC

## 2020-10-09 PROCEDURE — 76770 US EXAM ABDO BACK WALL COMP: CPT | Mod: 26

## 2020-10-09 RX ORDER — POLYETHYLENE GLYCOL 3350 17 G/17G
17 POWDER, FOR SOLUTION ORAL DAILY
Refills: 0 | Status: DISCONTINUED | OUTPATIENT
Start: 2020-10-09 | End: 2020-10-20

## 2020-10-09 RX ORDER — SODIUM BICARBONATE 1 MEQ/ML
0.06 SYRINGE (ML) INTRAVENOUS
Qty: 75 | Refills: 0 | Status: DISCONTINUED | OUTPATIENT
Start: 2020-10-09 | End: 2020-10-12

## 2020-10-09 RX ORDER — SENNA PLUS 8.6 MG/1
2 TABLET ORAL AT BEDTIME
Refills: 0 | Status: DISCONTINUED | OUTPATIENT
Start: 2020-10-09 | End: 2020-10-20

## 2020-10-09 RX ADMIN — POLYETHYLENE GLYCOL 3350 17 GRAM(S): 17 POWDER, FOR SOLUTION ORAL at 17:23

## 2020-10-09 RX ADMIN — HEPARIN SODIUM 5000 UNIT(S): 5000 INJECTION INTRAVENOUS; SUBCUTANEOUS at 16:39

## 2020-10-09 RX ADMIN — Medication 100 MILLIGRAM(S): at 17:23

## 2020-10-09 RX ADMIN — Medication 75 MEQ/KG/HR: at 10:35

## 2020-10-09 RX ADMIN — FINASTERIDE 5 MILLIGRAM(S): 5 TABLET, FILM COATED ORAL at 16:39

## 2020-10-09 RX ADMIN — HEPARIN SODIUM 5000 UNIT(S): 5000 INJECTION INTRAVENOUS; SUBCUTANEOUS at 23:07

## 2020-10-09 RX ADMIN — HEPARIN SODIUM 5000 UNIT(S): 5000 INJECTION INTRAVENOUS; SUBCUTANEOUS at 05:23

## 2020-10-09 RX ADMIN — SENNA PLUS 2 TABLET(S): 8.6 TABLET ORAL at 23:06

## 2020-10-09 RX ADMIN — Medication 100 MILLIGRAM(S): at 05:23

## 2020-10-09 RX ADMIN — TAMSULOSIN HYDROCHLORIDE 0.4 MILLIGRAM(S): 0.4 CAPSULE ORAL at 23:07

## 2020-10-09 NOTE — PROGRESS NOTE ADULT - SUBJECTIVE AND OBJECTIVE BOX
[facility]   MD GABO CAMEJO MD RUORU WONG, PA    TEL:  OFFICE: [phone]  DR KIM CELL: [phone]  JEROME DSOUZA CELL: [phone]  DR. SINGH CELL: [phone]  DR. JACOB CELL: [phone]    FROM 5 PM - 7 AM PLEASE CALL ANSWERING SERVICE: [phone]    RENAL FOLLOW UP NOTE--Date of Service 10-09-20 @ 09:28  --------------------------------------------------------------------------------  HPI:      Pt seen and examined at bedside.   Denies SOB, chest pain     PAST HISTORY  --------------------------------------------------------------------------------  No significant changes to PMH, PSH, FHx, SHx, unless otherwise noted    ALLERGIES & MEDICATIONS  --------------------------------------------------------------------------------  Allergies    penicillin (Rash)    Intolerances      Standing Inpatient Medications  finasteride 5 milliGRAM(s) Oral daily  heparin   Injectable 5000 Unit(s) SubCutaneous every 8 hours  influenza   Vaccine 0.5 milliLiter(s) IntraMuscular once  labetalol 100 milliGRAM(s) Oral two times a day  senna 2 Tablet(s) Oral at bedtime  sodium bicarbonate  Infusion 0.064 mEq/kG/Hr IV Continuous <Continuous>  tamsulosin 0.4 milliGRAM(s) Oral at bedtime    PRN Inpatient Medications      REVIEW OF SYSTEMS  --------------------------------------------------------------------------------  General: no fever  CVS: no chest pain  RESP: no sob, no cough  ABD: no abdominal pain  MSK: no edema     VITALS/PHYSICAL EXAM  --------------------------------------------------------------------------------  T(C): 36.7 (10-09-20 @ 04:04), Max: 36.7 (10-09-20 @ 04:04)  HR: 77 (10-09-20 @ 04:04) (73 - 80)  BP: 127/65 (10-09-20 @ 04:04) (110/66 - 130/70)  RR: 18 (10-09-20 @ 04:04) (18 - 18)  SpO2: 96% (10-09-20 @ 04:04) (96% - 97%)  Wt(kg): --    Weight (kg): 88 (10-07-20 @ 18:02)      10-08-20 @ 07:01  -  10-09-20 @ 07:00  --------------------------------------------------------  IN: 2100 mL / OUT: 1800 mL / NET: 300 mL      Physical Exam:  	Gen: NAD  	HEENT: MMM  	Pulm: CTA B/L  	CV: S1S2  	Abd: Soft, +BS  	Ext: No LE edema B/L                      Neuro: Awake alert  	Skin: Warm and Dry   	Vascular access: no hd catheter          [facility]  LABS/STUDIES  --------------------------------------------------------------------------------              8.0    6.92  >-----------<  128      [10-09-20 @ 05:25]              24.5     140  |  109  |  93  ----------------------------<  104      [10-09-20 @ 05:25]  3.5   |  16  |  7.94        Ca     7.4     [10-09-20 @ 05:25]      Mg     1.8     [10-08-20 @ 14:44]      Phos  5.4     [10-07-20 @ 12:31]    TPro  7.8  /  Alb  4.6  /  TBili  0.6  /  DBili  x   /  AST  11  /  ALT  11  /  AlkPhos  69  [10-07-20 @ 12:31]    PT/INR: PT 12.2 , INR 1.02       [10-07-20 @ 12:31]  PTT: 27.9       [10-07-20 @ 12:31]          [10-08-20 @ 02:00]    Creatinine Trend:  SCr 7.94 [10-09 @ 05:25]  SCr 8.87 [10-08 @ 14:44]  SCr 8.91 [10-08 @ 07:10]  SCr 8.98 [10-08 @ 02:00]  SCr 9.11 [10-07 @ 20:12]    Urinalysis - [10-07-20 @ 12:52]      Color Light Yellow / Appearance Clear / SG 1.013 / pH 6.0      Gluc Negative / Ketone Negative  / Bili Negative / Urobili Negative       Blood Small / Protein 30 mg/dL / Leuk Est Moderate / Nitrite Negative      RBC  / WBC 15 / Hyaline  / Gran  / Sq Epi  / Non Sq Epi  / Bacteria       TSH 1.01      [10-08-20 @ 02:55]    HCV 0.13, Nonreact      [10-08-20 @ 10:59]

## 2020-10-09 NOTE — PROGRESS NOTE ADULT - SUBJECTIVE AND OBJECTIVE BOX
HPI:  75yr Male with  pmhx cad s/p stent 13 years ago, htn, BPH and nephrolithiasis 28 yrs ago s/p lithotripsy sent in by his PCP for abnormal labs.   Patient states that he is feels weak and tired, difficulty urinating over the past few weeks, associated with poor appetite and nausea. Denies dysuria, abdominal pain, fevers. No hx vomiting/ abdominal pain. Patient was on Flomax which was discontinued 3 months ago, started on Ditropan last week by his urologist. Patient went to see his pmd and was referred to ed for abnormal blood work- elevated Creatinine.     In the ed patient noted to have creatinine 9.98 in ED. US, CT abdomen shows  performed by ED showed significant b/l hydro and urinary retention with enlarged Prostate. . Garcia placed drained 500cc urine. Urology consulted- recommend conservative management.   In the ed patient noted to have A.fib with rvr- converted to sinus with fluids.   night and urinary incontinence. He was previously on flomax but stopped 3 months ago. Placed on finasteride by PMD, and more recently on ditropan by urologist Dr. Mclaughlin (07 Oct 2020 18:02)      Patient is a 75y old  Male who presents with a chief complaint of Patient was sent by his pmd for abnormal labs (08 Oct 2020 10:50)      SUBJECTIVE / OVERNIGHT EVENTS: Patient feels much improved.     MEDICATIONS  (STANDING):  finasteride 5 milliGRAM(s) Oral daily  heparin   Injectable 5000 Unit(s) SubCutaneous every 8 hours  influenza   Vaccine 0.5 milliLiter(s) IntraMuscular once  labetalol 100 milliGRAM(s) Oral two times a day  polyethylene glycol 3350 17 Gram(s) Oral daily  senna 2 Tablet(s) Oral at bedtime  sodium bicarbonate  Infusion 0.064 mEq/kG/Hr (75 mL/Hr) IV Continuous <Continuous>  tamsulosin 0.4 milliGRAM(s) Oral at bedtime    MEDICATIONS  (PRN):    CAPILLARY BLOOD GLUCOSE        I&O's Summary    07 Oct 2020 07:01  -  08 Oct 2020 07:00  --------------------------------------------------------  IN: 520 mL / OUT: 1675 mL / NET: -1155 mL    08 Oct 2020 07:01  -  08 Oct 2020 19:07  --------------------------------------------------------  IN: 1080 mL / OUT: 400 mL / NET: 680 mL    T(C): 36.7 (10-09-20 @ 21:26), Max: 36.7 (10-09-20 @ 21:26)  HR: 80 (10-09-20 @ 21:26) (67 - 80)  BP: 134/80 (10-09-20 @ 21:26) (124/78 - 134/80)  RR: 18 (10-09-20 @ 21:26) (18 - 18)  SpO2: 98% (10-09-20 @ 21:26) (97% - 98%)  PHYSICAL EXAM:  GENERAL: NAD, well-developed  HEAD:  Atraumatic, Normocephalic  EYES: EOMI, PERRLA, conjunctiva and sclera clear  NECK: Supple, No JVD  CHEST/LUNG: Clear to auscultation bilaterally; No wheeze  HEART: Regular rate and rhythm; No murmurs, rubs, or gallops  ABDOMEN: Soft, Nontender, Nondistended; Bowel sounds present  EXTREMITIES:  2+ Peripheral Pulses, No clubbing, cyanosis, or edema  PSYCH: AAOx3  NEUROLOGY: non-focal  SKIN: No rashes or lesions    LABS:                        8.4    8.39  )-----------( 166      ( 08 Oct 2020 07:15 )             26.2                             7.9    7.20  )-----------( 152      ( 09 Oct 2020 18:28 )             24.4       CARDIAC MARKERS ( 08 Oct 2020 02:00 )  x     / x     / 387 U/L / x     / 3.4 ng/mL          141|109|89<114  3.4|17|7.37  7.4,--,--  10-09 @ 18:28  140|109|93<104  3.5|16|7.94  7.4,--,--  10-09 @ 05:25     PTT - ( 07 Oct 2020 12:31 )  PTT:27.9 sec  CARDIAC MARKERS ( 08 Oct 2020 02:00 )  x     / x     / 387 U/L / x     / 3.4 ng/mL  CARDIAC MARKERS ( 07 Oct 2020 20:12 )  x     / x     / 490 U/L / x     / 4.4 ng/mL  CARDIAC MARKERS ( 07 Oct 2020 12:31 )  x     / x     / x     / x     / 5.4 ng/mL      Urinalysis Basic - ( 07 Oct 2020 12:52 )    Color: Light Yellow / Appearance: Clear / S.013 / pH: x  Gluc: x / Ketone: Negative  / Bili: Negative / Urobili: Negative   Blood: x / Protein: 30 mg/dL / Nitrite: Negative   Leuk Esterase: Moderate / RBC: x / WBC 15 /HPF   Sq Epi: x / Non Sq Epi: x / Bacteria: x        RADIOLOGY & ADDITIONAL TESTS:    Imaging Personally Reviewed:    Consultant(s) Notes Reviewed:  renal, cards     Care Discussed with Consultants/Other Providers:

## 2020-10-09 NOTE — PROGRESS NOTE ADULT - SUBJECTIVE AND OBJECTIVE BOX
UROLOGY DAILY PROGRESS NOTE:     Subjective:    No events overnight.     Objective:    NAD, awake and alert  Respirations nonlabored  Abdomen soft, nontender, nondistended  Garcia clear    MEDICATIONS  (STANDING):  finasteride 5 milliGRAM(s) Oral daily  heparin   Injectable 5000 Unit(s) SubCutaneous every 8 hours  influenza   Vaccine 0.5 milliLiter(s) IntraMuscular once  labetalol 100 milliGRAM(s) Oral two times a day  polyethylene glycol 3350 17 Gram(s) Oral daily  senna 2 Tablet(s) Oral at bedtime  sodium bicarbonate  Infusion 0.064 mEq/kG/Hr (75 mL/Hr) IV Continuous <Continuous>  tamsulosin 0.4 milliGRAM(s) Oral at bedtime    MEDICATIONS  (PRN):      Vital Signs Last 24 Hrs  T(C): 36 (09 Oct 2020 12:07), Max: 36.7 (09 Oct 2020 04:04)  T(F): 96.8 (09 Oct 2020 12:07), Max: 98 (09 Oct 2020 04:04)  HR: 67 (09 Oct 2020 12:07) (67 - 80)  BP: 124/78 (09 Oct 2020 12:07) (110/66 - 127/65)  BP(mean): --  RR: 18 (09 Oct 2020 12:07) (18 - 18)  SpO2: 97% (09 Oct 2020 12:07) (96% - 97%)    I&O's Detail    08 Oct 2020 07:01  -  09 Oct 2020 07:00  --------------------------------------------------------  IN:    Oral Fluid: 600 mL    sodium chloride 0.9%: 1500 mL  Total IN: 2100 mL    OUT:    Indwelling Catheter - Urethral (mL): 1800 mL  Total OUT: 1800 mL    Total NET: 300 mL      09 Oct 2020 07:01  -  09 Oct 2020 17:35  --------------------------------------------------------  IN:    Oral Fluid: 480 mL  Total IN: 480 mL    OUT:    Indwelling Catheter - Urethral (mL): 900 mL  Total OUT: 900 mL    Total NET: -420 mL        Daily Weight in k.9 (09 Oct 2020 04:04)    LABS:                        8.0    6.92  )-----------( 128      ( 09 Oct 2020 05:25 )             24.5     10    140  |  109<H>  |  93<H>  ----------------------------<  104<H>  3.5   |  16<L>  |  7.94<H>    Ca    7.4<L>      09 Oct 2020 05:25  Mg     1.8     10-08

## 2020-10-09 NOTE — PROGRESS NOTE ADULT - ASSESSMENT
A/P: Pt is a 75 year old male presenting to ED for VENUS (Cr 9.98), found to have severe b/l hydroureteronephrosis     After 2 days of gaston drainage, GFR unchanged  Repeat ultrasound with persistent severe bilateral hydronephrosis    - With no improvement in hyronephrosis and minimal improvement in renal function after bladder decompression, VENUS does not seem to be due to bladder outlet obstruction  - IR consult for bilateral nephrostomy tubes  - Stents would likely be very difficult/unachievable as this hydronephrosis is likely due to extrinsic compression at the distal ureters  - Trend SCr  - Monitor UOP  - Followup nephrology  D/w Dr. Massimo Pinto

## 2020-10-09 NOTE — PROGRESS NOTE ADULT - SUBJECTIVE AND OBJECTIVE BOX
Cardiovascular Disease Progress Note    Overnight events: No acute events overnight. Patient denies chest pain or SOB.     Otherwise review of systems negative    Objective Findings:  T(C): 36.7 (10-09-20 @ 04:04), Max: 36.7 (10-09-20 @ 04:04)  HR: 77 (10-09-20 @ 04:04) (73 - 80)  BP: 127/65 (10-09-20 @ 04:04) (110/66 - 130/70)  RR: 18 (10-09-20 @ 04:04) (18 - 18)  SpO2: 96% (10-09-20 @ 04:04) (96% - 97%)  Wt(kg): --  Daily     Daily Weight in k.9 (09 Oct 2020 04:04)      Physical Exam:  Gen: NAD; Patient resting comfortably  HEENT: EOMI, Normocephalic/ atraumatic  CV: RRR, normal S1 + S2, no m/r/g  Lungs:  Normal respiratory effort; clear to auscultation bilaterally  Abd: soft, non-tender; bowel sounds present  Ext: No edema; warm and well perfused    Telemetry: Sinus    Laboratory Data:                        8.0    6.92  )-----------( 128      ( 09 Oct 2020 05:25 )             24.5     10-    140  |  109<H>  |  93<H>  ----------------------------<  104<H>  3.5   |  16<L>  |  7.94<H>    Ca    7.4<L>      09 Oct 2020 05:25  Phos  5.4     10-07  Mg     1.8     10-    TPro  7.8  /  Alb  4.6  /  TBili  0.6  /  DBili  x   /  AST  11  /  ALT  11  /  AlkPhos  69  10-07    PT/INR - ( 07 Oct 2020 12:31 )   PT: 12.2 sec;   INR: 1.02 ratio         PTT - ( 07 Oct 2020 12:31 )  PTT:27.9 sec  CARDIAC MARKERS ( 08 Oct 2020 02:00 )  x     / x     / 387 U/L / x     / 3.4 ng/mL  CARDIAC MARKERS ( 07 Oct 2020 20:12 )  x     / x     / 490 U/L / x     / 4.4 ng/mL  CARDIAC MARKERS ( 07 Oct 2020 12:31 )  x     / x     / x     / x     / 5.4 ng/mL          Inpatient Medications:  MEDICATIONS  (STANDING):  finasteride 5 milliGRAM(s) Oral daily  heparin   Injectable 5000 Unit(s) SubCutaneous every 8 hours  influenza   Vaccine 0.5 milliLiter(s) IntraMuscular once  labetalol 100 milliGRAM(s) Oral two times a day  senna 2 Tablet(s) Oral at bedtime  sodium chloride 0.9%. 1000 milliLiter(s) (75 mL/Hr) IV Continuous <Continuous>  tamsulosin 0.4 milliGRAM(s) Oral at bedtime      Assessment: 75 year old man with CAD s/p PCI several years ago, HTN, and BPH presents with obstructive VENUS and a-fib with RVR.     Plan of Care:    #A-fib with RVR-  Driven by obstructive uropathy and profound renal injury.  After Garcia placement and IV fluids, Mr. Raymundo spontaneously converted to sinus rhythm in the ED.  No further a-fib thus far.   Given the transient nature of his a-fib, I would hold off on anticoagulation.   Echo reviewed- TR with mild pHTN noted.     #Elevated troponins-  Secondary to reduced creatinine clearance.  No plan for coronary ischemic work up at this time given VENUS.    #CAD s/p PCI-  PCI several years ago.   Unclear why patient is not on antiplatelet therapy.  Hold off on ASA should urologic intervention be required.        Over 25 minutes spent on total encounter; more than 50% of the visit was spent counseling and/or coordinating care by the attending physician.      Lionel Miller MD [facility]  Cardiovascular Disease  [phone]

## 2020-10-09 NOTE — PROGRESS NOTE ADULT - ASSESSMENT
75 y.o. male history of CAD with a stent not currently on blood thinners sent in from his PCP for abnormal labs.  Pt has not been feeling well over the past week or so. c/o general weakness, poor appetite, n/v x1 week. denied fevers, or chills.  Does report decreased PO and decreased BM and urine output. US performed by ED showed significant b/l hydro and urinary retention. Gaston placed drained 500cc urine. seen by  s/p gaston  nephrology consulted for VENUS    VENUS  likely sec to obstructive uropathy  CT with b/l hydro s/p Gaston .  Pt non oliguric   Follow up Urology  Planned for repeat renal sonogram   Renal function with mild improvement. Monitor BMP at present   Change IVF to 1/2NS with bicarb at 75cc/hr   If  patient with worsening  uremic symptoms patient would require HD  Consent for hd obtained in chart, risk and benefit explained to patient. All questions answered.     B/l hydronephrosis   CT with b/l hydro s/p Gaston .  Pt non oliguric   Follow up Urology  Planned for repeat renal sonogram     Proteinruria  in setting of obstruction/ gaston  Repeat UA  Monitor at present

## 2020-10-09 NOTE — PROVIDER CONTACT NOTE (CHANGE IN STATUS NOTIFICATION) - ACTION/TREATMENT ORDERED:
RRT called and responded. See RRT record for details. CT abdomen/chest ordered and done, pending results. Will continue to monitor.

## 2020-10-09 NOTE — PROVIDER CONTACT NOTE (CHANGE IN STATUS NOTIFICATION) - BACKGROUND
Patient admitting diagnosis of MRSA, bacteremia. Hx of DM, HTN, ESRD on HD. Recent admission for septic shock.

## 2020-10-09 NOTE — PROVIDER CONTACT NOTE (CHANGE IN STATUS NOTIFICATION) - ASSESSMENT
While working with PT, patient with 15 seconds of unresponsiveness while sitting up at bedside. Patient returned to bed lying position and became more responsive to verbal stimuli.

## 2020-10-10 LAB
ANION GAP SERPL CALC-SCNC: 13 MMOL/L — SIGNIFICANT CHANGE UP (ref 5–17)
ANION GAP SERPL CALC-SCNC: 15 MMOL/L — SIGNIFICANT CHANGE UP (ref 5–17)
BUN SERPL-MCNC: 76 MG/DL — HIGH (ref 7–23)
BUN SERPL-MCNC: 82 MG/DL — HIGH (ref 7–23)
CALCIUM SERPL-MCNC: 7.7 MG/DL — LOW (ref 8.4–10.5)
CALCIUM SERPL-MCNC: 8 MG/DL — LOW (ref 8.4–10.5)
CHLORIDE SERPL-SCNC: 109 MMOL/L — HIGH (ref 96–108)
CHLORIDE SERPL-SCNC: 109 MMOL/L — HIGH (ref 96–108)
CO2 SERPL-SCNC: 18 MMOL/L — LOW (ref 22–31)
CO2 SERPL-SCNC: 22 MMOL/L — SIGNIFICANT CHANGE UP (ref 22–31)
CREAT SERPL-MCNC: 6.43 MG/DL — HIGH (ref 0.5–1.3)
CREAT SERPL-MCNC: 7.06 MG/DL — HIGH (ref 0.5–1.3)
GLUCOSE SERPL-MCNC: 94 MG/DL — SIGNIFICANT CHANGE UP (ref 70–99)
GLUCOSE SERPL-MCNC: 99 MG/DL — SIGNIFICANT CHANGE UP (ref 70–99)
HCT VFR BLD CALC: 24.5 % — LOW (ref 39–50)
HGB BLD-MCNC: 7.7 G/DL — LOW (ref 13–17)
MCHC RBC-ENTMCNC: 28.3 PG — SIGNIFICANT CHANGE UP (ref 27–34)
MCHC RBC-ENTMCNC: 31.4 GM/DL — LOW (ref 32–36)
MCV RBC AUTO: 90.1 FL — SIGNIFICANT CHANGE UP (ref 80–100)
NRBC # BLD: 0 /100 WBCS — SIGNIFICANT CHANGE UP (ref 0–0)
PLATELET # BLD AUTO: 136 K/UL — LOW (ref 150–400)
POTASSIUM SERPL-MCNC: 3.4 MMOL/L — LOW (ref 3.5–5.3)
POTASSIUM SERPL-MCNC: 3.5 MMOL/L — SIGNIFICANT CHANGE UP (ref 3.5–5.3)
POTASSIUM SERPL-SCNC: 3.4 MMOL/L — LOW (ref 3.5–5.3)
POTASSIUM SERPL-SCNC: 3.5 MMOL/L — SIGNIFICANT CHANGE UP (ref 3.5–5.3)
RBC # BLD: 2.72 M/UL — LOW (ref 4.2–5.8)
RBC # FLD: 12.2 % — SIGNIFICANT CHANGE UP (ref 10.3–14.5)
SODIUM SERPL-SCNC: 142 MMOL/L — SIGNIFICANT CHANGE UP (ref 135–145)
SODIUM SERPL-SCNC: 144 MMOL/L — SIGNIFICANT CHANGE UP (ref 135–145)
WBC # BLD: 5.78 K/UL — SIGNIFICANT CHANGE UP (ref 3.8–10.5)
WBC # FLD AUTO: 5.78 K/UL — SIGNIFICANT CHANGE UP (ref 3.8–10.5)

## 2020-10-10 PROCEDURE — 99232 SBSQ HOSP IP/OBS MODERATE 35: CPT

## 2020-10-10 RX ADMIN — HEPARIN SODIUM 5000 UNIT(S): 5000 INJECTION INTRAVENOUS; SUBCUTANEOUS at 21:54

## 2020-10-10 RX ADMIN — FINASTERIDE 5 MILLIGRAM(S): 5 TABLET, FILM COATED ORAL at 21:54

## 2020-10-10 RX ADMIN — HEPARIN SODIUM 5000 UNIT(S): 5000 INJECTION INTRAVENOUS; SUBCUTANEOUS at 06:26

## 2020-10-10 RX ADMIN — POLYETHYLENE GLYCOL 3350 17 GRAM(S): 17 POWDER, FOR SOLUTION ORAL at 17:59

## 2020-10-10 RX ADMIN — Medication 75 MEQ/KG/HR: at 18:03

## 2020-10-10 RX ADMIN — TAMSULOSIN HYDROCHLORIDE 0.4 MILLIGRAM(S): 0.4 CAPSULE ORAL at 21:54

## 2020-10-10 RX ADMIN — SENNA PLUS 2 TABLET(S): 8.6 TABLET ORAL at 21:54

## 2020-10-10 RX ADMIN — Medication 100 MILLIGRAM(S): at 17:59

## 2020-10-10 RX ADMIN — Medication 100 MILLIGRAM(S): at 06:26

## 2020-10-10 NOTE — PHYSICAL THERAPY INITIAL EVALUATION ADULT - PERTINENT HX OF CURRENT PROBLEM, REHAB EVAL
75 y.o. male history of CAD with a stent not currently on blood thinners sent in from his PCP for abnormal labs.  Pt has not been feeling well over the past week or so. c/o general weakness, poor appetite, n/v x1 week. denied fevers, or chills.  Does report decreased PO and decreased BM and urine output. US performed by ED showed significant b/l hydro and urinary retention.

## 2020-10-10 NOTE — CONSULT NOTE ADULT - ASSESSMENT
Assessment: 75y Male with bilateral hydronephrosis and VENUS. Of note, patient's creatinine has steadily trended downwards since gaston catheter placement and is now at 6.43 from initial peak of 9.98. There is no CT evidence for extrinsic compression of the distal ureters. Thus the most likely cause for the patient's hydronephrosis is bladder outlet obstruction.    Plan:  -would continue to keep bladder decompressed as creatinine is steadily improving with gaston in place  -if hydronephrosis persists when creatinine has plateued, would recommend ureteral stent placement as first line rather than nephrostomy tube placement as there is no evidence for extrinsic compression of the ureters  -if ureteral stent placement is unsuccessful and patient still has obstructive uropathy would consider placement of bilateral nephrostomy tubes  -Please call interventional radiology at [phone] during normal business hours, or [phone] and page [phone] during call hours or weekends with any questions, concerns or issues.    Tim Byrne MD, RPVI  Chief Resident, Interventional Radiology  [facility]: [phone] (p) [phone]  [facility]: [phone]

## 2020-10-10 NOTE — PROGRESS NOTE ADULT - SUBJECTIVE AND OBJECTIVE BOX
Subjective  Seen and examined, patient offers no complaints. Gaston draining CYU.    Objective    Vital signs  T(F): , Max: 98.4 (10-10-20 @ 04:15)  HR: 75 (10-10-20 @ 04:15)  BP: 136/81 (10-10-20 @ 04:15)  SpO2: 95% (10-10-20 @ 04:15)  Wt(kg): --    Output     OUT:    Indwelling Catheter - Urethral (mL): 2600 mL  Total OUT: 2600 mL    Total NET: -2600 mL          Gen: NAD  Abd: soft, nontender, nondistended  : gaston secured in place, draining CYU    Labs      10-10 @ 06:56    WBC 5.78  / Hct 24.5  / SCr --       10-10 @ 06:53    WBC --    / Hct --    / SCr 7.06         Culture - Urine (collected 10-07-20 @ 17:49)  Source: .Urine Catheterized  Final Report (10-08-20 @ 17:00):    No growth        Imaging    < from: US Kidney and Bladder (10.09.20 @ 15:59) >    EXAM:  US KIDNEYS AND BLADDER                            PROCEDURE DATE:  10/09/2020            INTERPRETATION:  CLINICAL INFORMATION: Acute kidney injury (Cr-8.87).    COMPARISON: CT abdomen/pelvis from 10/7/2020.    TECHNIQUE: Sonography of the kidneys and bladder.    FINDINGS:    Right kidney: 10.1 cm. Severe hydroureteronephrosis with cortical thinning. A 3.0 x 2.8 x 2.4 cm and a 0.9 x 0.7 x 0.8 cm lower pole simple cysts.    Left kidney: 10.4 cm. Severe hydroureteronephrosis with cortical thinning.    Collapsed urinary bladder with a Gaston catheter in place. There is a 1.2 x 0.6 x 1.3 cm bladder stone as noted on CT.   Prostate gland is enlarged.    IMPRESSION:    Severe bilateral hydroureteronephrosis with cortical atrophy. Right renal cysts as described above.    A 1.2 x 0.6 x 1.3 cm bladder stone as noted on CT.              SONIA NGUYEN M.D., RADIOLOGY RESIDENT  This document has been electronically signed.  AILYN JIMENEZ M.D., ATTENDING RADIOLOGIST  This document has been electronically signed. Oct  9 2020  4:26PM    < end of copied text >

## 2020-10-10 NOTE — PROGRESS NOTE ADULT - SUBJECTIVE AND OBJECTIVE BOX
[facility] [place] [facility]   MD Ranulfo Rodrigues MD, D.O. Ruoru Wong, PA    From 7 AM - 5 PM:  OFFICE: [phone]  Dr. Hunter cell: [phone]  Dr. Le cell: [phone]  Dr. Bustillos cell: [phone]  GENET Mcnally cell: [phone]    From 5 PM - 7 AM: Answering Service: [phone]  Date of service: 10-10-20 @ 11:40    RENAL FOLLOW UP NOTE  --------------------------------------------------------------------------------  HPI:  Pt seen and examined at bedside.        PAST HISTORY  --------------------------------------------------------------------------------  No significant changes to PMH, PSH, FHx, SHx, unless otherwise noted    ALLERGIES & MEDICATIONS  --------------------------------------------------------------------------------  Allergies    penicillin (Rash)    Intolerances      Standing Inpatient Medications  finasteride 5 milliGRAM(s) Oral daily  heparin   Injectable 5000 Unit(s) SubCutaneous every 8 hours  influenza   Vaccine 0.5 milliLiter(s) IntraMuscular once  labetalol 100 milliGRAM(s) Oral two times a day  polyethylene glycol 3350 17 Gram(s) Oral daily  senna 2 Tablet(s) Oral at bedtime  sodium bicarbonate  Infusion 0.064 mEq/kG/Hr IV Continuous <Continuous>  tamsulosin 0.4 milliGRAM(s) Oral at bedtime    PRN Inpatient Medications      REVIEW OF SYSTEMS  --------------------------------------------------------------------------------  General: no fever  CVS: no chest pain  RESP: no sob, no cough  ABD: no abdominal pain  : no dysuria  MSK: no edema     VITALS/PHYSICAL EXAM  --------------------------------------------------------------------------------  T(C): 36.9 (10-10-20 @ 04:15), Max: 36.9 (10-10-20 @ 04:15)  HR: 75 (10-10-20 @ 04:15) (67 - 80)  BP: 136/81 (10-10-20 @ 04:15) (124/78 - 136/81)  RR: 18 (10-10-20 @ 04:15) (18 - 18)  SpO2: 95% (10-10-20 @ 04:15) (95% - 98%)  Wt(kg): --        10-09-20 @ 07:01  -  10-10-20 @ 07:00  --------------------------------------------------------  IN: 2280 mL / OUT: 2600 mL / NET: -320 mL    10-10-20 @ 07:01  -  10-10-20 @ 11:40  --------------------------------------------------------  IN: 0 mL / OUT: 0 mL / NET: 0 mL      Physical Exam:  	Gen: NAD  	HEENT: MMM  	Pulm: CTA B/L  	CV: S1S2  	Abd: Soft, +BS + gaston   	Ext: No LE edema B/L                      Neuro: Awake   	Skin: Warm and Dry   	    LABS/STUDIES  --------------------------------------------------------------------------------              7.7    5.78  >-----------<  136      [10-10-20 @ 06:56]              24.5     142  |  109  |  82  ----------------------------<  94      [10-10-20 @ 06:53]  3.4   |  18  |  7.06        Ca     8.0     [10-10-20 @ 06:53]      Mg     1.8     [10-08-20 @ 14:44]            Creatinine Trend:  SCr 7.06 [10-10 @ 06:53]  SCr 7.37 [10-09 @ 18:28]  SCr 7.94 [10-09 @ 05:25]  SCr 8.87 [10-08 @ 14:44]  SCr 8.91 [10-08 @ 07:10]    Urinalysis - [10-07-20 @ 12:52]      Color Light Yellow / Appearance Clear / SG 1.013 / pH 6.0      Gluc Negative / Ketone Negative  / Bili Negative / Urobili Negative       Blood Small / Protein 30 mg/dL / Leuk Est Moderate / Nitrite Negative      RBC  / WBC 15 / Hyaline  / Gran  / Sq Epi  / Non Sq Epi  / Bacteria       TSH 1.01      [10-08-20 @ 02:55]    HCV 0.13, Nonreact      [10-08-20 @ 10:59]

## 2020-10-10 NOTE — PROGRESS NOTE ADULT - SUBJECTIVE AND OBJECTIVE BOX
Cardiovascular Disease Progress Note    Patient is currently laying flat and comfortably. Mental status back to baseline.     Otherwise review of systems negative    Objective Findings:  T(C): 36.9 (10-10-20 @ 04:15), Max: 36.9 (10-10-20 @ 04:15)  HR: 75 (10-10-20 @ 04:15) (67 - 80)  BP: 136/81 (10-10-20 @ 04:15) (124/78 - 136/81)  RR: 18 (10-10-20 @ 04:15) (18 - 18)  SpO2: 95% (10-10-20 @ 04:15) (95% - 98%)  Wt(kg): --  Daily     Daily Weight in k.8 (10 Oct 2020 04:15)      Physical Exam:  Gen: NAD; Patient resting comfortably  HEENT: EOMI, Normocephalic/ atraumatic  CV: RRR, normal S1 + S2, no m/r/g  Lungs:  Normal respiratory effort; clear to auscultation bilaterally  Abd: soft, non-tender; bowel sounds present  Ext: No edema; warm and well perfused    Telemetry: Sinus    Laboratory Data:                        7.7    5.78  )-----------( 136      ( 10 Oct 2020 06:56 )             24.5     10-10    142  |  109<H>  |  82<H>  ----------------------------<  94  3.4<L>   |  18<L>  |  7.06<H>    Ca    8.0<L>      10 Oct 2020 06:53  Mg     1.8     10-08                Inpatient Medications:  MEDICATIONS  (STANDING):  finasteride 5 milliGRAM(s) Oral daily  heparin   Injectable 5000 Unit(s) SubCutaneous every 8 hours  influenza   Vaccine 0.5 milliLiter(s) IntraMuscular once  labetalol 100 milliGRAM(s) Oral two times a day  polyethylene glycol 3350 17 Gram(s) Oral daily  senna 2 Tablet(s) Oral at bedtime  sodium bicarbonate  Infusion 0.064 mEq/kG/Hr (75 mL/Hr) IV Continuous <Continuous>  tamsulosin 0.4 milliGRAM(s) Oral at bedtime      Assessment: 75 year old man with CAD s/p PCI several years ago, HTN, and BPH presents with obstructive VENUS and a-fib with RVR.     Plan of Care:    #A-fib with RVR-  Driven by obstructive uropathy and profound renal injury.  After Garcia placement and IV fluids, Mr. Raymundo spontaneously converted to sinus rhythm in the ED.  No further a-fib thus far.   Given the transient nature of his a-fib, I would hold off on anticoagulation.   Echo reviewed- TR with mild pHTN noted.     #Elevated troponins-  Secondary to reduced creatinine clearance.  No plan for coronary ischemic work up at this time given VENUS.    #CAD s/p PCI-  PCI several years ago.   Unclear why patient is not on antiplatelet therapy.  Hold off on ASA given plan for b/l nephrostomy.       Over 25 minutes spent on total encounter; more than 50% of the visit was spent counseling and/or coordinating care by the attending physician.      Lionel Miller MD [facility]  Cardiovascular Disease  [phone]

## 2020-10-10 NOTE — CHART NOTE - NSCHARTNOTEFT_GEN_A_CORE
Spoke with Son, Claude Hamlin, at [phone] per request of Son and primary team. Mr. Hamlin expressed concern regarding delaying nephrostomy tubes until Monday 10/12/2020. Explained that this is a procedure performed by interventional radiology. Also discussed that urology would likely be unsuccessful at attempting retrograde stent placements due to anatomy of patient. All questions answered. Mr. Hamlin expressed understanding of situation and gratitude. He requested call back from interventional radiology, which I conveyed to the primary team, who will reach out to IR on behalf of the son.

## 2020-10-10 NOTE — CHART NOTE - NSCHARTNOTEFT_GEN_A_CORE
Patient with VENUS and hydronephrosis.  Per Urology GENET Figueroa, plan for OR tomorrow for cysto and b/l stent placement.   NPO except meds  repeat US renal to eval hydronephrosis per Urology  BMP in am  c/w Garcia  c/w Flomax  monitor urine output  F/u with primary team in am    Emmanuelle Cohen, NP  Medicine  [id number]

## 2020-10-10 NOTE — PHYSICAL THERAPY INITIAL EVALUATION ADULT - CRITERIA FOR SKILLED THERAPEUTIC INTERVENTIONS
risk reduction/prevention/therapy frequency/anticipated discharge recommendation/functional limitations in following categories/predicted duration of therapy intervention/anticipated equipment needs at discharge/impairments found

## 2020-10-10 NOTE — CHART NOTE - NSCHARTNOTEFT_GEN_A_CORE
Per Urology's conversation with pt's son, called IR to request to speak to son. NO call back. Will reattempt.

## 2020-10-10 NOTE — PROGRESS NOTE ADULT - SUBJECTIVE AND OBJECTIVE BOX
HPI:  75yr Male with  pmhx cad s/p stent 13 years ago, htn, BPH and nephrolithiasis 28 yrs ago s/p lithotripsy sent in by his PCP for abnormal labs.   Patient states that he is feels weak and tired, difficulty urinating over the past few weeks, associated with poor appetite and nausea. Denies dysuria, abdominal pain, fevers. No hx vomiting/ abdominal pain. Patient was on Flomax which was discontinued 3 months ago, started on Ditropan last week by his urologist. Patient went to see his pmd and was referred to ed for abnormal blood work- elevated Creatinine.     In the ed patient noted to have creatinine 9.98 in ED. US, CT abdomen shows  performed by ED showed significant b/l hydro and urinary retention with enlarged Prostate. . Garcia placed drained 500cc urine. Urology consulted- recommend conservative management.   In the ed patient noted to have A.fib with rvr- converted to sinus with fluids.   night and urinary incontinence. He was previously on flomax but stopped 3 months ago. Placed on finasteride by PMD, and more recently on ditropan by urologist Dr. Mclaughlin (07 Oct 2020 18:02)      Patient is a 75y old  Male who presents with a chief complaint of Patient was sent by his pmd for abnormal labs (08 Oct 2020 10:50)      SUBJECTIVE / OVERNIGHT EVENTS: Patient feels much improved.     MEDICATIONS  (STANDING):  finasteride 5 milliGRAM(s) Oral daily  heparin   Injectable 5000 Unit(s) SubCutaneous every 8 hours  influenza   Vaccine 0.5 milliLiter(s) IntraMuscular once  labetalol 100 milliGRAM(s) Oral two times a day  polyethylene glycol 3350 17 Gram(s) Oral daily  senna 2 Tablet(s) Oral at bedtime  sodium bicarbonate  Infusion 0.064 mEq/kG/Hr (75 mL/Hr) IV Continuous <Continuous>  tamsulosin 0.4 milliGRAM(s) Oral at bedtime    MEDICATIONS  (PRN):  CAPILLARY BLOOD GLUCOSE      T(C): 36.7 (10-10-20 @ 21:11), Max: 36.7 (10-10-20 @ 12:06)  HR: 79 (10-10-20 @ 21:11) (74 - 79)  BP: 150/86 (10-10-20 @ 21:11) (136/81 - 150/86)  RR: 18 (1010-20 @ 21:11) (18 - 18)  SpO2: 95% (10-10-20 @ 21:11) (95% - 97%)      PHYSICAL EXAM:  GENERAL: NAD, well-developed  HEAD:  Atraumatic, Normocephalic  EYES: EOMI, conjunctiva and sclera clear  NECK: Supple, No JVD  CHEST/LUNG: Clear to auscultation bilaterally; No wheeze  HEART: Regular rate and rhythm; No murmurs, rubs, or gallops  ABDOMEN: Soft, Nontender, Nondistended; Bowel sounds present  EXTREMITIES:  2+ Peripheral Pulses, No clubbing, cyanosis, or edema  PSYCH: AAOx3  NEUROLOGY: non-focal  SKIN: No rashes or lesions                          7.7    5.78  )-----------( 136      ( 10 Oct 2020 06:56 )             24.5               144|109|76<99  3.5|22|6.43  7.7,--,--  10-10 @ 17:22  142|109|82<94  3.4|18|7.06  8.0,--,--  10-10 @ 06:53       PTT - ( 07 Oct 2020 12:31 )  PTT:27.9 sec  CARDIAC MARKERS ( 08 Oct 2020 02:00 )  x     / x     / 387 U/L / x     / 3.4 ng/mL  CARDIAC MARKERS ( 07 Oct 2020 20:12 )  x     / x     / 490 U/L / x     / 4.4 ng/mL  CARDIAC MARKERS ( 07 Oct 2020 12:31 )  x     / x     / x     / x     / 5.4 ng/mL      Urinalysis Basic - ( 07 Oct 2020 12:52 )    Color: Light Yellow / Appearance: Clear / S.013 / pH: x  Gluc: x / Ketone: Negative  / Bili: Negative / Urobili: Negative   Blood: x / Protein: 30 mg/dL / Nitrite: Negative   Leuk Esterase: Moderate / RBC: x / WBC 15 /HPF   Sq Epi: x / Non Sq Epi: x / Bacteria: x        RADIOLOGY & ADDITIONAL TESTS:    Imaging Personally Reviewed:    Consultant(s) Notes Reviewed:  renal, cards     Care Discussed with Consultants/Other Providers:

## 2020-10-10 NOTE — PROGRESS NOTE ADULT - ASSESSMENT
75 year old man with VENUS (Cr 9.98), found to have severe b/l hydroureteronephrosis likely due to external compression of ureteral orifices from enlarged prostate rather than bladder outlet obstruction as VENUS minimally improved with gaston placement.  Stents would likely be very difficult/unachievable as this hydronephrosis is likely due to extrinsic compression at the distal ureters.    - Recommend IR consult for bilateral nephrostomy tubes  - May repeat renal US in 24 hours to assess any improvement in hydro  - Trend SCr  - Monitor UOP  - Appreciate nephrology recommendations     75 year old man with VENUS (Cr 9.98), found to have severe b/l hydroureteronephrosis likely due to external compression of ureteral orifices from enlarged prostate with significant median lobe rather than bladder outlet obstruction as VENUS minimally improved with gaston placement.  Retrograde stents would likely be very difficult/unachievable as this hydronephrosis is likely due to extrinsic compression at the distal ureters.    - Recommend IR consult for bilateral nephrostomy tubes  - May repeat renal US in 24 hours to assess any improvement in hydro  - Trend SCr  - Monitor UOP  - Appreciate nephrology recommendations

## 2020-10-11 DIAGNOSIS — I47.2 VENTRICULAR TACHYCARDIA: ICD-10-CM

## 2020-10-11 LAB
ANION GAP SERPL CALC-SCNC: 13 MMOL/L — SIGNIFICANT CHANGE UP (ref 5–17)
APPEARANCE UR: ABNORMAL
BILIRUB UR-MCNC: NEGATIVE — SIGNIFICANT CHANGE UP
BUN SERPL-MCNC: 75 MG/DL — HIGH (ref 7–23)
CALCIUM SERPL-MCNC: 7.6 MG/DL — LOW (ref 8.4–10.5)
CHLORIDE SERPL-SCNC: 108 MMOL/L — SIGNIFICANT CHANGE UP (ref 96–108)
CO2 SERPL-SCNC: 22 MMOL/L — SIGNIFICANT CHANGE UP (ref 22–31)
COLOR SPEC: SIGNIFICANT CHANGE UP
CREAT SERPL-MCNC: 5.9 MG/DL — HIGH (ref 0.5–1.3)
DIFF PNL FLD: ABNORMAL
FERRITIN SERPL-MCNC: 601 NG/ML — HIGH (ref 30–400)
GLUCOSE SERPL-MCNC: 97 MG/DL — SIGNIFICANT CHANGE UP (ref 70–99)
GLUCOSE UR QL: ABNORMAL
HCT VFR BLD CALC: 24.5 % — LOW (ref 39–50)
HGB BLD-MCNC: 7.9 G/DL — LOW (ref 13–17)
IRON SATN MFR SERPL: 19 % — SIGNIFICANT CHANGE UP (ref 16–55)
IRON SATN MFR SERPL: 38 UG/DL — LOW (ref 45–165)
KETONES UR-MCNC: NEGATIVE — SIGNIFICANT CHANGE UP
LEUKOCYTE ESTERASE UR-ACNC: ABNORMAL
MAGNESIUM SERPL-MCNC: 1.6 MG/DL — SIGNIFICANT CHANGE UP (ref 1.6–2.6)
MCHC RBC-ENTMCNC: 28.9 PG — SIGNIFICANT CHANGE UP (ref 27–34)
MCHC RBC-ENTMCNC: 32.2 GM/DL — SIGNIFICANT CHANGE UP (ref 32–36)
MCV RBC AUTO: 89.7 FL — SIGNIFICANT CHANGE UP (ref 80–100)
NITRITE UR-MCNC: NEGATIVE — SIGNIFICANT CHANGE UP
NRBC # BLD: 0 /100 WBCS — SIGNIFICANT CHANGE UP (ref 0–0)
PH UR: 8 — SIGNIFICANT CHANGE UP (ref 5–8)
PHOSPHATE SERPL-MCNC: 3.5 MG/DL — SIGNIFICANT CHANGE UP (ref 2.5–4.5)
PLATELET # BLD AUTO: 151 K/UL — SIGNIFICANT CHANGE UP (ref 150–400)
POTASSIUM SERPL-MCNC: 3.6 MMOL/L — SIGNIFICANT CHANGE UP (ref 3.5–5.3)
POTASSIUM SERPL-SCNC: 3.6 MMOL/L — SIGNIFICANT CHANGE UP (ref 3.5–5.3)
PROT UR-MCNC: ABNORMAL
RBC # BLD: 2.73 M/UL — LOW (ref 4.2–5.8)
RBC # FLD: 12.2 % — SIGNIFICANT CHANGE UP (ref 10.3–14.5)
SODIUM SERPL-SCNC: 143 MMOL/L — SIGNIFICANT CHANGE UP (ref 135–145)
SP GR SPEC: 1.01 — SIGNIFICANT CHANGE UP (ref 1.01–1.02)
TIBC SERPL-MCNC: 205 UG/DL — LOW (ref 220–430)
UIBC SERPL-MCNC: 167 UG/DL — SIGNIFICANT CHANGE UP (ref 110–370)
UROBILINOGEN FLD QL: NEGATIVE — SIGNIFICANT CHANGE UP
WBC # BLD: 5.74 K/UL — SIGNIFICANT CHANGE UP (ref 3.8–10.5)
WBC # FLD AUTO: 5.74 K/UL — SIGNIFICANT CHANGE UP (ref 3.8–10.5)

## 2020-10-11 PROCEDURE — 76770 US EXAM ABDO BACK WALL COMP: CPT | Mod: 26

## 2020-10-11 PROCEDURE — 99232 SBSQ HOSP IP/OBS MODERATE 35: CPT | Mod: GC

## 2020-10-11 RX ORDER — MAGNESIUM SULFATE 500 MG/ML
1 VIAL (ML) INJECTION ONCE
Refills: 0 | Status: COMPLETED | OUTPATIENT
Start: 2020-10-11 | End: 2020-10-11

## 2020-10-11 RX ADMIN — HEPARIN SODIUM 5000 UNIT(S): 5000 INJECTION INTRAVENOUS; SUBCUTANEOUS at 23:09

## 2020-10-11 RX ADMIN — POLYETHYLENE GLYCOL 3350 17 GRAM(S): 17 POWDER, FOR SOLUTION ORAL at 17:46

## 2020-10-11 RX ADMIN — Medication 100 MILLIGRAM(S): at 05:18

## 2020-10-11 RX ADMIN — Medication 100 GRAM(S): at 09:10

## 2020-10-11 RX ADMIN — Medication 100 MILLIGRAM(S): at 17:46

## 2020-10-11 RX ADMIN — HEPARIN SODIUM 5000 UNIT(S): 5000 INJECTION INTRAVENOUS; SUBCUTANEOUS at 05:18

## 2020-10-11 RX ADMIN — FINASTERIDE 5 MILLIGRAM(S): 5 TABLET, FILM COATED ORAL at 23:09

## 2020-10-11 RX ADMIN — TAMSULOSIN HYDROCHLORIDE 0.4 MILLIGRAM(S): 0.4 CAPSULE ORAL at 23:08

## 2020-10-11 RX ADMIN — SENNA PLUS 2 TABLET(S): 8.6 TABLET ORAL at 23:10

## 2020-10-11 NOTE — PROVIDER CONTACT NOTE (OTHER) - ASSESSMENT
Pt. AxOx4. VSS. Pt. asymptomatic, asleep. Pt. denies chest pain, SOB, palpitations, discomfort. Tele event occurred at 3:28am, notified as PAT 4 seconds by R.N., assessed pt. Event corrected at 4:30am by tele tech as SVT 12 seconds. Notified provider of new event on tele.

## 2020-10-11 NOTE — PROGRESS NOTE ADULT - ASSESSMENT
75 y.o. male history of CAD with a stent not currently on blood thinners sent in from his PCP for abnormal labs.  Pt has not been feeling well over the past week or so. c/o general weakness, poor appetite, n/v x1 week. denied fevers, or chills.  Does report decreased PO and decreased BM and urine output. US performed by ED showed significant b/l hydro and urinary retention. Gaston placed drained 500cc urine. seen by  s/p gaston  nephrology consulted for VENUS    VENUS  likely sec to obstructive uropathy  CT with b/l hydro s/p Gaston .  Pt non oliguric   Follow up Urology  Renal function with significant improvement today w/ indwelling gaston   Continue IVF to 1/2NS with bicarb at 75cc/hr today  sonogram 10/9 with persistent severe hydronephrosis.   Urology requested for for b/l PCN placement by IR. IR consult reviewed today. Given obstruction is at the level of the bladder and pt improving with indwelling gaston, agree with IR plan   Pt will need further work up for bladder obstruction. f/u urology   can consider repeat sonogram Monday to see if hydronephrosis is improving   If  patient with worsening  uremic symptoms patient would require HD. No plan for HD at present given improving renal function   Consent for hd obtained in chart, risk and benefit explained to patient. All questions answered.     B/l hydronephrosis   CT with b/l hydro s/p Gaston .  Pt non oliguric   Follow up Urology    Proteinuria  in setting of obstruction/ gaston  Repeat UA  Monitor at present   75 y.o. male history of CAD with a stent not currently on blood thinners sent in from his PCP for abnormal labs.  Pt has not been feeling well over the past week or so. c/o general weakness, poor appetite, n/v x1 week. denied fevers, or chills.  Does report decreased PO and decreased BM and urine output. US performed by ED showed significant b/l hydro and urinary retention. Gaston placed drained 500cc urine. seen by  s/p gaston  nephrology consulted for VENUS    VENUS  likely sec to obstructive uropathy  CT with b/l hydro s/p Gaston .  Pt non oliguric   Follow up Urology  Renal function with significant improvement today w/ indwelling gaston   Continue IVF to 1/2NS with bicarb at 75cc/hr today  sonogram 10/9 with persistent severe hydronephrosis.   Urology requested for for b/l PCN placement by IR. IR consult reviewed today. Given obstruction is at the level of the bladder and pt improving with indwelling gaston, agree with IR plan   Pt will need further work up for bladder obstruction. f/u urology possible OR today   can consider repeat sonogram to see if hydronephrosis is improving   If  patient with worsening  uremic symptoms patient would require HD. No plan for HD at present given improving renal function   Consent for hd obtained in chart, risk and benefit explained to patient. All questions answered.     B/l hydronephrosis   CT with b/l hydro s/p Gaston .  Pt non oliguric   Follow up Urology    Proteinuria  in setting of obstruction/ gaston  Repeat UA  Monitor at present

## 2020-10-11 NOTE — PROGRESS NOTE ADULT - SUBJECTIVE AND OBJECTIVE BOX
Interval Events:    No acute events overnight  Cr continues to downtrend this morning  Good uop overnight    O: Vital Signs Last 24 Hrs  T(C): 36.7 (11 Oct 2020 12:44), Max: 36.7 (10 Oct 2020 21:11)  T(F): 98.1 (11 Oct 2020 12:44), Max: 98.1 (11 Oct 2020 12:44)  HR: 72 (11 Oct 2020 12:44) (72 - 79)  BP: 158/86 (11 Oct 2020 12:44) (132/81 - 158/86)  BP(mean): --  RR: 19 (11 Oct 2020 12:44) (18 - 19)  SpO2: 97% (11 Oct 2020 12:44) (95% - 97%)      10 Oct 2020 07:01  -  11 Oct 2020 07:00  --------------------------------------------------------  IN:    Oral Fluid: 240 mL    Sodium Bicarbonate: 1800 mL  Total IN: 2040 mL    OUT:    Indwelling Catheter - Urethral (mL): 2500 mL  Total OUT: 2500 mL    Total NET: -460 mL      11 Oct 2020 07:01  -  11 Oct 2020 13:19  --------------------------------------------------------  IN:  Total IN: 0 mL    OUT:    Oral Fluid: 0 mL  Total OUT: 0 mL    Total NET: 0 mL          Physical Exam:    Gen: Well-developed, well-nourished in no acute distress  Resp: No additional work of breathing   GI: Soft, non-tender, non-distended, with normoactive bowel sounds.  No masses.  MSK: Moves all extremities equally  Skin: No rashes    Labs:                        7.9    5.74  )-----------( 151      ( 11 Oct 2020 07:00 )             24.5     11 Oct 2020 07:25    143    |  108    |  75     ----------------------------<  97     3.6     |  22     |  5.90     Ca    7.6        11 Oct 2020 07:25  Phos  3.5       11 Oct 2020 07:25  Mg     1.6       11 Oct 2020 07:25        CAPILLARY BLOOD GLUCOSE                    MEDICATIONS  (STANDING):  finasteride 5 milliGRAM(s) Oral daily  heparin   Injectable 5000 Unit(s) SubCutaneous every 8 hours  influenza   Vaccine 0.5 milliLiter(s) IntraMuscular once  labetalol 100 milliGRAM(s) Oral two times a day  polyethylene glycol 3350 17 Gram(s) Oral daily  senna 2 Tablet(s) Oral at bedtime  sodium bicarbonate  Infusion 0.064 mEq/kG/Hr (75 mL/Hr) IV Continuous <Continuous>  tamsulosin 0.4 milliGRAM(s) Oral at bedtime    MEDICATIONS  (PRN):

## 2020-10-11 NOTE — PROGRESS NOTE ADULT - ATTENDING COMMENTS
Patient seen and examined. Agree with assessment and plan.   Patient with bilateral ureteral obstruction despite bladder drainage with gaston  Significant concern for possible bilateral ureteral obstruction from extrinsic compression and markedly enlarged prostate  Recommend IR consult for bilateral nephrostomy tube drainage give severe renal insufficiency.
Patient seen and examined. Agree with assessment and plan.  Monitor uop  Follow creatinine
Patient seen and examined. Agree with assessment and plan.    CT scan large median lobe with bladder stones  Consider IR for nephrostomy placement

## 2020-10-11 NOTE — PROGRESS NOTE ADULT - SUBJECTIVE AND OBJECTIVE BOX
[facility] [place]   MD Ranulfo Rodrigues MD, D.O. Ruoru Wong, PA    From 7 AM - 5 PM:  OFFICE: [phone]  Dr. Hunter cell: [phone]  Dr. Le cell: [phone]  Dr. Bustillos cell: [phone]  GENET Mcnally cell: [phone]    From 5 PM - 7 AM: Answering Service: [phone]  Date of service: 10-11-20 @ 12:36    RENAL FOLLOW UP NOTE  --------------------------------------------------------------------------------  HPI:  Pt seen and examined at bedside.       PAST HISTORY  --------------------------------------------------------------------------------  No significant changes to PMH, PSH, FHx, SHx, unless otherwise noted    ALLERGIES & MEDICATIONS  --------------------------------------------------------------------------------  Allergies    penicillin (Rash)    Intolerances      Standing Inpatient Medications  finasteride 5 milliGRAM(s) Oral daily  heparin   Injectable 5000 Unit(s) SubCutaneous every 8 hours  influenza   Vaccine 0.5 milliLiter(s) IntraMuscular once  labetalol 100 milliGRAM(s) Oral two times a day  polyethylene glycol 3350 17 Gram(s) Oral daily  senna 2 Tablet(s) Oral at bedtime  sodium bicarbonate  Infusion 0.064 mEq/kG/Hr IV Continuous <Continuous>  tamsulosin 0.4 milliGRAM(s) Oral at bedtime    PRN Inpatient Medications      REVIEW OF SYSTEMS  --------------------------------------------------------------------------------  General: no fever  CVS: no chest pain  RESP: no sob, no cough  ABD: no abdominal pain  : no dysuria,  MSK: no edema     VITALS/PHYSICAL EXAM  --------------------------------------------------------------------------------  T(C): 36.7 (10-11-20 @ 04:29), Max: 36.7 (10-10-20 @ 21:11)  HR: 76 (10-11-20 @ 04:29) (76 - 79)  BP: 132/81 (10-11-20 @ 04:29) (132/81 - 150/86)  RR: 18 (10-11-20 @ 04:29) (18 - 18)  SpO2: 95% (10-11-20 @ 04:29) (95% - 95%)  Wt(kg): --        10-10-20 @ 07:01  -  10-11-20 @ 07:00  --------------------------------------------------------  IN: 2040 mL / OUT: 2500 mL / NET: -460 mL    10-11-20 @ 07:01  -  10-11-20 @ 12:36  --------------------------------------------------------  IN: 0 mL / OUT: 0 mL / NET: 0 mL      Physical Exam:  	Gen: NAD  	HEENT: MMM  	Pulm: CTA B/L  	CV: S1S2  	Abd: Soft, +BS +gaston   	Ext: No LE edema B/L                      Neuro: Awake   	Skin: Warm and Dry   	    LABS/STUDIES  --------------------------------------------------------------------------------              7.9    5.74  >-----------<  151      [10-11-20 @ 07:00]              24.5     143  |  108  |  75  ----------------------------<  97      [10-11-20 @ 07:25]  3.6   |  22  |  5.90        Ca     7.6     [10-11-20 @ 07:25]      Mg     1.6     [10-11-20 @ 07:25]      Phos  3.5     [10-11-20 @ 07:25]    Creatinine Trend:  SCr 5.90 [10-11 @ 07:25]  SCr 6.43 [10-10 @ 17:22]  SCr 7.06 [10-10 @ 06:53]  SCr 7.37 [10-09 @ 18:28]  SCr 7.94 [10-09 @ 05:25]    Urinalysis - [10-07-20 @ 12:52]      Color Light Yellow / Appearance Clear / SG 1.013 / pH 6.0      Gluc Negative / Ketone Negative  / Bili Negative / Urobili Negative       Blood Small / Protein 30 mg/dL / Leuk Est Moderate / Nitrite Negative      RBC  / WBC 15 / Hyaline  / Gran  / Sq Epi  / Non Sq Epi  / Bacteria       Iron 38, TIBC 205, %sat 19      [10-11-20 @ 09:26]  Ferritin 601      [10-11-20 @ 09:26]  TSH 1.01      [10-08-20 @ 02:55]    HCV 0.13, Nonreact      [10-08-20 @ 10:59]

## 2020-10-11 NOTE — PROGRESS NOTE ADULT - SUBJECTIVE AND OBJECTIVE BOX
HPI:  75yr Male with  pmhx cad s/p stent 13 years ago, htn, BPH and nephrolithiasis 28 yrs ago s/p lithotripsy sent in by his PCP for abnormal labs.   Patient states that he is feels weak and tired, difficulty urinating over the past few weeks, associated with poor appetite and nausea. Denies dysuria, abdominal pain, fevers. No hx vomiting/ abdominal pain. Patient was on Flomax which was discontinued 3 months ago, started on Ditropan last week by his urologist. Patient went to see his pmd and was referred to ed for abnormal blood work- elevated Creatinine.     In the ed patient noted to have creatinine 9.98 in ED. US, CT abdomen shows  performed by ED showed significant b/l hydro and urinary retention with enlarged Prostate. . Garcia placed drained 500cc urine. Urology consulted- recommend conservative management.   In the ed patient noted to have A.fib with rvr- converted to sinus with fluids.   night and urinary incontinence. He was previously on flomax but stopped 3 months ago. Placed on finasteride by PMD, and more recently on ditropan by urologist Dr. Mclaughlin (07 Oct 2020 18:02)      Patient is a 75y old  Male who presents with a chief complaint of Patient was sent by his pmd for abnormal labs (08 Oct 2020 10:50)      SUBJECTIVE / OVERNIGHT EVENTS: Patient feels much improved.   Tele- 12 secs NSVT last night.     T(C): 36.7 (10-11-20 @ 12:44), Max: 36.7 (10-11-20 @ 12:44)  HR: 72 (10-11-20 @ 12:44) (72 - 72)  BP: 158/86 (10-11-20 @ 12:44) (158/86 - 158/86)  RR: 19 (10-11-20 @ 12:44) (19 - 19)  SpO2: 97% (10-11-20 @ 12:44) (97% - 97%)      MEDICATIONS  (STANDING):  finasteride 5 milliGRAM(s) Oral daily  heparin   Injectable 5000 Unit(s) SubCutaneous every 8 hours  influenza   Vaccine 0.5 milliLiter(s) IntraMuscular once  labetalol 100 milliGRAM(s) Oral two times a day  polyethylene glycol 3350 17 Gram(s) Oral daily  senna 2 Tablet(s) Oral at bedtime  sodium bicarbonate  Infusion 0.064 mEq/kG/Hr (75 mL/Hr) IV Continuous <Continuous>  tamsulosin 0.4 milliGRAM(s) Oral at bedtime    MEDICATIONS  (PRN):    PHYSICAL EXAM:  GENERAL: NAD, well-developed  HEAD:  Atraumatic, Normocephalic  EYES: EOMI, conjunctiva and sclera clear  NECK: Supple, No JVD  CHEST/LUNG: Clear to auscultation bilaterally; No wheeze  HEART: Regular rate and rhythm; No murmurs, rubs, or gallops  ABDOMEN: Soft, Nontender, Nondistended; Bowel sounds present  EXTREMITIES:  2+ Peripheral Pulses, No clubbing, cyanosis, or edema  PSYCH: AAOx3  NEUROLOGY: non-focal  SKIN: No rashes or lesions                                          7.9    5.74  )-----------( 151      ( 11 Oct 2020 07:00 )             24.5               143|108|75<97  3.6|22|5.90  7.6,1.6,3.5  10-11 @ 07:25      Urinalysis Basic - ( 07 Oct 2020 12:52 )    Color: Light Yellow / Appearance: Clear / S.013 / pH: x  Gluc: x / Ketone: Negative  / Bili: Negative / Urobili: Negative   Blood: x / Protein: 30 mg/dL / Nitrite: Negative   Leuk Esterase: Moderate / RBC: x / WBC 15 /HPF   Sq Epi: x / Non Sq Epi: x / Bacteria: x    CAPILLARY BLOOD GLUCOSE          RADIOLOGY & ADDITIONAL TESTS:    Imaging Personally Reviewed:    Consultant(s) Notes Reviewed:  renal, cards     Care Discussed with Consultants/Other Providers:

## 2020-10-11 NOTE — PROGRESS NOTE ADULT - ASSESSMENT
75 year old man with VENUS (Cr 9.98), found to have severe b/l hydroureteronephrosis likely due to external compression of ureteral orifices from enlarged prostate with significant median lobe rather than bladder outlet obstruction as VENUS minimally improved with gaston placement.  Retrograde stents would likely be very difficult/unachievable as this hydronephrosis is likely due to extrinsic compression at the distal ureters.    - Will hold off on stent for now as good uop and cr improving  - May repeat renal US to assess any improvement in hydro  - Trend SCr  - Monitor UOP  - Appreciate nephrology recommendations

## 2020-10-11 NOTE — CHART NOTE - NSCHARTNOTEFT_GEN_A_CORE
Called by RN at 0435 that at 0328 patient had a run of SVT  x12 sec on tele monitor. RN reported that at that time patient was sleeping, VS were stable and patient was asymptomatic.  Currently, patient resting comfortably in bed, alert and oriented. asymptomatic. No complaints to offer. No lightheadedness, headache, paresthesias, SOB, CP, palpitations, or abdominal pain.  - c/w labetalol  - check EKG if recurrent episode  - check electrolytes  - monitor VS closely  - discussed with RN to notify provider immediately of any arrythmia on telemetry  - f/u Cardiology in am  - f/u primary team in am    Emmanuelle Cohen, MIKHAIL  Medicine  [id number] Called by RN at 0435 that at 0328 patient had a run of SVT  x12 sec on tele monitor. RN reported that at that time patient was sleeping, VS were stable and patient was asymptomatic.  Event strip unavailable to view per RN and tele tech.  Currently, patient resting comfortably in bed, alert and oriented. asymptomatic. No complaints to offer. No lightheadedness, headache, paresthesias, SOB, CP, palpitations, or abdominal pain.    Vitals:T(F): 98 (10-11-20 @ 04:29)  HR: 76 (10-11-20 @ 04:29)  BP: 132/81 (10-11-20 @ 04:29)  RR: 18 (10-11-20 @ 04:29)  SpO2: 95% (10-11-20 @ 04:29)    PE:  gen: NAD, comfortable  resp: CTA  CV:S1S2, RRR  abd: soft NTND  Gu; +Garcia  ext: no edema    A/P  75 year old man with CAD s/p PCI several years ago, HTN, and BPH presents with obstructive VENUS now with episode of SVT  x12sec on tele monitor  - currently asymptomatic, SR 70s on monitor  - c/w labetalol  - check EKG if recurrent episode, discussed with RN  - check electrolytes  - monitor VS closely  - discussed with RN to notify provider immediately of any arrythmia on telemetry  - f/u Cardiology in am  - f/u with Attending in am    Emmanuelle Cohen NP  Medicine  [id number] Called by RN at 0435 that at 0328 patient had a run of SVT  x12 sec on tele monitor. RN reported that at that time patient was sleeping, VS were stable and patient was asymptomatic.  Event strip unavailable to view per RN and tele tech.  Currently, patient resting comfortably in bed, alert and oriented. asymptomatic. No complaints to offer. No lightheadedness, headache, paresthesias, SOB, CP, palpitations, or abdominal pain.    Vitals:T(F): 98 (10-11-20 @ 04:29)  HR: 76 (10-11-20 @ 04:29)  BP: 132/81 (10-11-20 @ 04:29)  RR: 18 (10-11-20 @ 04:29)  SpO2: 95% (10-11-20 @ 04:29)    PE:  gen: NAD, comfortable  resp: CTA  CV:S1S2, RRR  abd: soft NTND  Gu; +Garcia  ext: no edema    A/P  75 year old man with CAD s/p PCI several years ago, HTN, and BPH presents with obstructive VENUS now with episode of SVT  x12sec on tele monitor  - currently asymptomatic, SR 70s on monitor  - c/w labetalol  - check EKG if recurrent episode, discussed with RN  - check electrolytes  - monitor VS closely  - discussed with RN to notify provider immediately of any arrythmia on telemetry  - f/u Cardiology in am  - Discussed above with Dr. Kathi Cohen, NP  Medicine  [phone]

## 2020-10-11 NOTE — PROGRESS NOTE ADULT - SUBJECTIVE AND OBJECTIVE BOX
Cardiovascular Disease Progress Note    Overnight events: SVT overnight.   Patient is laying flat and comfortably in no distress.     Otherwise review of systems negative    Objective Findings:  T(C): 36.7 (10-11-20 @ 04:29), Max: 36.7 (10-10-20 @ 12:06)  HR: 76 (10-11-20 @ 04:29) (74 - 79)  BP: 132/81 (10-11-20 @ 04:29) (132/81 - 150/86)  RR: 18 (10-11-20 @ 04:29) (18 - 18)  SpO2: 95% (10-11-20 @ 04:29) (95% - 97%)  Wt(kg): --  Daily     Daily Weight in k.8 (11 Oct 2020 04:29)      Physical Exam:  Gen: NAD; Patient resting comfortably  HEENT: EOMI, Normocephalic/ atraumatic  CV: RRR, normal S1 + S2, no m/r/g  Lungs:  Normal respiratory effort; clear to auscultation bilaterally  Abd: soft, non-tender; bowel sounds present  Ext: No edema; warm and well perfused    Telemetry: Sinus    Laboratory Data:                        7.9    5.74  )-----------( 151      ( 11 Oct 2020 07:00 )             24.5     10-    143  |  108  |  75<H>  ----------------------------<  97  3.6   |  22  |  5.90<H>    Ca    7.6<L>      11 Oct 2020 07:25  Phos  3.5     10-11  Mg     1.6     10-11                Inpatient Medications:  MEDICATIONS  (STANDING):  finasteride 5 milliGRAM(s) Oral daily  heparin   Injectable 5000 Unit(s) SubCutaneous every 8 hours  influenza   Vaccine 0.5 milliLiter(s) IntraMuscular once  labetalol 100 milliGRAM(s) Oral two times a day  polyethylene glycol 3350 17 Gram(s) Oral daily  senna 2 Tablet(s) Oral at bedtime  sodium bicarbonate  Infusion 0.064 mEq/kG/Hr (75 mL/Hr) IV Continuous <Continuous>  tamsulosin 0.4 milliGRAM(s) Oral at bedtime      Assessment: 75 year old man with CAD s/p PCI several years ago, HTN, and BPH presents with obstructive VENUS and a-fib with RVR.     Plan of Care:    #A-fib with RVR-  Driven by obstructive uropathy and profound renal injury.  No further a-fib thus far.   Given the transient nature of his a-fib, I would hold off on anticoagulation.   Echo reviewed- TR with mild pHTN noted.     #Non-sustained SVT-  12 seconds of SVT noted overnight without hemodynamic compromise.  Continue low dose labetalol- sinus bradycardia noted while sleeping.   Mr. Raymundo is optimized from a cardiac standpoint for b/l nephrostomy.     #Elevated troponins-  Secondary to reduced creatinine clearance.  No plan for coronary ischemic work up at this time given VENUS.    #CAD s/p PCI-  PCI several years ago.   Unclear why patient is not on antiplatelet therapy.  Hold off on ASA given plan for b/l nephrostomy.     #ACP (advance care planning)-  Advanced care planning was discussed All questions were answered.        Over 25 minutes spent on total encounter; more than 50% of the visit was spent counseling and/or coordinating care by the attending physician.      Lionel Miller MD [facility]  Cardiovascular Disease  [phone]

## 2020-10-12 LAB
ANION GAP SERPL CALC-SCNC: 11 MMOL/L — SIGNIFICANT CHANGE UP (ref 5–17)
ANION GAP SERPL CALC-SCNC: 12 MMOL/L — SIGNIFICANT CHANGE UP (ref 5–17)
ANION GAP SERPL CALC-SCNC: 12 MMOL/L — SIGNIFICANT CHANGE UP (ref 5–17)
BUN SERPL-MCNC: 62 MG/DL — HIGH (ref 7–23)
BUN SERPL-MCNC: 63 MG/DL — HIGH (ref 7–23)
BUN SERPL-MCNC: 66 MG/DL — HIGH (ref 7–23)
CALCIUM SERPL-MCNC: 7.9 MG/DL — LOW (ref 8.4–10.5)
CALCIUM SERPL-MCNC: 8.1 MG/DL — LOW (ref 8.4–10.5)
CALCIUM SERPL-MCNC: 8.2 MG/DL — LOW (ref 8.4–10.5)
CHLORIDE SERPL-SCNC: 105 MMOL/L — SIGNIFICANT CHANGE UP (ref 96–108)
CHLORIDE SERPL-SCNC: 106 MMOL/L — SIGNIFICANT CHANGE UP (ref 96–108)
CHLORIDE SERPL-SCNC: 106 MMOL/L — SIGNIFICANT CHANGE UP (ref 96–108)
CO2 SERPL-SCNC: 24 MMOL/L — SIGNIFICANT CHANGE UP (ref 22–31)
CO2 SERPL-SCNC: 25 MMOL/L — SIGNIFICANT CHANGE UP (ref 22–31)
CO2 SERPL-SCNC: 25 MMOL/L — SIGNIFICANT CHANGE UP (ref 22–31)
CREAT SERPL-MCNC: 5.45 MG/DL — HIGH (ref 0.5–1.3)
CREAT SERPL-MCNC: 5.57 MG/DL — HIGH (ref 0.5–1.3)
CREAT SERPL-MCNC: 5.59 MG/DL — HIGH (ref 0.5–1.3)
GLUCOSE SERPL-MCNC: 102 MG/DL — HIGH (ref 70–99)
GLUCOSE SERPL-MCNC: 119 MG/DL — HIGH (ref 70–99)
GLUCOSE SERPL-MCNC: 99 MG/DL — SIGNIFICANT CHANGE UP (ref 70–99)
HCT VFR BLD CALC: 24.2 % — LOW (ref 39–50)
HGB BLD-MCNC: 7.9 G/DL — LOW (ref 13–17)
MAGNESIUM SERPL-MCNC: 1.8 MG/DL — SIGNIFICANT CHANGE UP (ref 1.6–2.6)
MCHC RBC-ENTMCNC: 29.5 PG — SIGNIFICANT CHANGE UP (ref 27–34)
MCHC RBC-ENTMCNC: 32.6 GM/DL — SIGNIFICANT CHANGE UP (ref 32–36)
MCV RBC AUTO: 90.3 FL — SIGNIFICANT CHANGE UP (ref 80–100)
NRBC # BLD: 0 /100 WBCS — SIGNIFICANT CHANGE UP (ref 0–0)
OB PNL STL: NEGATIVE — SIGNIFICANT CHANGE UP
PLATELET # BLD AUTO: 143 K/UL — LOW (ref 150–400)
POTASSIUM SERPL-MCNC: 3.3 MMOL/L — LOW (ref 3.5–5.3)
POTASSIUM SERPL-MCNC: 3.5 MMOL/L — SIGNIFICANT CHANGE UP (ref 3.5–5.3)
POTASSIUM SERPL-MCNC: 3.7 MMOL/L — SIGNIFICANT CHANGE UP (ref 3.5–5.3)
POTASSIUM SERPL-SCNC: 3.3 MMOL/L — LOW (ref 3.5–5.3)
POTASSIUM SERPL-SCNC: 3.5 MMOL/L — SIGNIFICANT CHANGE UP (ref 3.5–5.3)
POTASSIUM SERPL-SCNC: 3.7 MMOL/L — SIGNIFICANT CHANGE UP (ref 3.5–5.3)
RBC # BLD: 2.68 M/UL — LOW (ref 4.2–5.8)
RBC # FLD: 12.1 % — SIGNIFICANT CHANGE UP (ref 10.3–14.5)
SODIUM SERPL-SCNC: 141 MMOL/L — SIGNIFICANT CHANGE UP (ref 135–145)
SODIUM SERPL-SCNC: 142 MMOL/L — SIGNIFICANT CHANGE UP (ref 135–145)
SODIUM SERPL-SCNC: 143 MMOL/L — SIGNIFICANT CHANGE UP (ref 135–145)
WBC # BLD: 5.46 K/UL — SIGNIFICANT CHANGE UP (ref 3.8–10.5)
WBC # FLD AUTO: 5.46 K/UL — SIGNIFICANT CHANGE UP (ref 3.8–10.5)

## 2020-10-12 RX ORDER — SODIUM CHLORIDE 9 MG/ML
1000 INJECTION INTRAMUSCULAR; INTRAVENOUS; SUBCUTANEOUS
Refills: 0 | Status: DISCONTINUED | OUTPATIENT
Start: 2020-10-12 | End: 2020-10-19

## 2020-10-12 RX ORDER — POTASSIUM CHLORIDE 20 MEQ
20 PACKET (EA) ORAL ONCE
Refills: 0 | Status: COMPLETED | OUTPATIENT
Start: 2020-10-12 | End: 2020-10-12

## 2020-10-12 RX ADMIN — SENNA PLUS 2 TABLET(S): 8.6 TABLET ORAL at 21:00

## 2020-10-12 RX ADMIN — SODIUM CHLORIDE 50 MILLILITER(S): 9 INJECTION INTRAMUSCULAR; INTRAVENOUS; SUBCUTANEOUS at 10:00

## 2020-10-12 RX ADMIN — HEPARIN SODIUM 5000 UNIT(S): 5000 INJECTION INTRAVENOUS; SUBCUTANEOUS at 21:00

## 2020-10-12 RX ADMIN — Medication 100 MILLIGRAM(S): at 17:25

## 2020-10-12 RX ADMIN — HEPARIN SODIUM 5000 UNIT(S): 5000 INJECTION INTRAVENOUS; SUBCUTANEOUS at 13:19

## 2020-10-12 RX ADMIN — TAMSULOSIN HYDROCHLORIDE 0.4 MILLIGRAM(S): 0.4 CAPSULE ORAL at 21:00

## 2020-10-12 RX ADMIN — HEPARIN SODIUM 5000 UNIT(S): 5000 INJECTION INTRAVENOUS; SUBCUTANEOUS at 07:14

## 2020-10-12 RX ADMIN — POLYETHYLENE GLYCOL 3350 17 GRAM(S): 17 POWDER, FOR SOLUTION ORAL at 17:25

## 2020-10-12 RX ADMIN — FINASTERIDE 5 MILLIGRAM(S): 5 TABLET, FILM COATED ORAL at 21:00

## 2020-10-12 RX ADMIN — Medication 100 MILLIGRAM(S): at 07:15

## 2020-10-12 RX ADMIN — Medication 20 MILLIEQUIVALENT(S): at 09:53

## 2020-10-12 NOTE — PROGRESS NOTE ADULT - ASSESSMENT
75 year old man with VENUS (Cr 9.98), found to have severe b/l hydroureteronephrosis   Gaston in place  SCr persistently downtrending    - As patient has good urine output and SCr is persistently downtrending, would hold off on further intervention  - SCr may improve slowly if pt had long-standing outlet obstruction  - Continue to trend SCr and monitor urine output  - Keep gaston catheter to drainage  - Appreciate nephrology recommendations

## 2020-10-12 NOTE — PROGRESS NOTE ADULT - ASSESSMENT
75 y.o. male history of CAD with a stent not currently on blood thinners sent in from his PCP for abnormal labs.  Pt has not been feeling well over the past week or so. c/o general weakness, poor appetite, n/v x1 week. denied fevers, or chills.  Does report decreased PO and decreased BM and urine output. US performed by ED showed significant b/l hydro and urinary retention. Gaston placed drained 500cc urine. seen by  s/p gaston  nephrology consulted for VENUS    VENUS  likely sec to obstructive uropathy  CT with b/l hydro s/p Gaston .  Pt non oliguric   Follow up Urology  Renal function improving  w/ indwelling gaston   Change IVF to NS at 50cc/hr   sonogram 10/9 with persistent severe hydronephrosis.   Follow up URology and IR for b/l PCN placement vs Stent   Pt will need further work up for bladder obstruction. f/u urology   can consider repeat sonogram to see if hydronephrosis is improving   If  patient with worsening  uremic symptoms patient would require HD. No plan for HD at present given improving renal function   Consent for hd obtained in chart, risk and benefit explained to patient. All questions answered.     B/l hydronephrosis   CT with b/l hydro s/p Gaston .  Pt non oliguric   Follow up Urology    Proteinuria  in setting of obstruction/ gaston  Repeat UA  Monitor at present

## 2020-10-12 NOTE — PROGRESS NOTE ADULT - SUBJECTIVE AND OBJECTIVE BOX
HPI:  75yr Male with  pmhx cad s/p stent 13 years ago, htn, BPH and nephrolithiasis 28 yrs ago s/p lithotripsy sent in by his PCP for abnormal labs.   Patient states that he is feels weak and tired, difficulty urinating over the past few weeks, associated with poor appetite and nausea. Denies dysuria, abdominal pain, fevers. No hx vomiting/ abdominal pain. Patient was on Flomax which was discontinued 3 months ago, started on Ditropan last week by his urologist. Patient went to see his pmd and was referred to ed for abnormal blood work- elevated Creatinine.     In the ed patient noted to have creatinine 9.98 in ED. US, CT abdomen shows  performed by ED showed significant b/l hydro and urinary retention with enlarged Prostate. . Garcia placed drained 500cc urine. Urology consulted- recommend conservative management.   In the ed patient noted to have A.fib with rvr- converted to sinus with fluids.   night and urinary incontinence. He was previously on flomax but stopped 3 months ago. Placed on finasteride by PMD, and more recently on ditropan by urologist Dr. Mclaughlin (07 Oct 2020 18:02)      Patient is a 75y old  Male who presents with a chief complaint of Patient was sent by his pmd for abnormal labs (08 Oct 2020 10:50)      SUBJECTIVE / OVERNIGHT EVENTS: Patient feels much improved.   note not saved in the system.     T(C): 36.8 (10-13-20 @ 04:17), Max: 36.9 (10-12-20 @ 21:00)  HR: 75 (10-13-20 @ 04:17) (75 - 76)  BP: 132/84 (10-13-20 @ 04:17) (122/73 - 132/84)  RR: 18 (10-13-20 @ 04:17) (18 - 18)  SpO2: 96% (10-13-20 @ 04:17) (96% - 96%)    MEDICATIONS  (STANDING):  finasteride 5 milliGRAM(s) Oral daily  heparin   Injectable 5000 Unit(s) SubCutaneous every 8 hours  influenza   Vaccine 0.5 milliLiter(s) IntraMuscular once  labetalol 100 milliGRAM(s) Oral two times a day  polyethylene glycol 3350 17 Gram(s) Oral daily  senna 2 Tablet(s) Oral at bedtime  sodium chloride 0.9%. 1000 milliLiter(s) (50 mL/Hr) IV Continuous <Continuous>  tamsulosin 0.4 milliGRAM(s) Oral at bedtime    MEDICATIONS  (PRN):    PHYSICAL EXAM:  GENERAL: NAD, well-developed  HEAD:  Atraumatic, Normocephalic  EYES: EOMI, conjunctiva and sclera clear  NECK: Supple, No JVD  CHEST/LUNG: Clear to auscultation bilaterally; No wheeze  HEART: Regular rate and rhythm; No murmurs, rubs, or gallops  ABDOMEN: Soft, Nontender, Nondistended; Bowel sounds present  EXTREMITIES:  2+ Peripheral Pulses, No clubbing, cyanosis, or edema  PSYCH: AAOx3  NEUROLOGY: non-focal  SKIN: No rashes or lesions                                                           7.4    5.33  )-----------( 146      ( 13 Oct 2020 07:05 )             23.2               141|108|58<102  3.7|19|5.11  7.9,1.6,--  10-13 @ 07:05  143|106|62<119  3.7|25|5.45  7.9,--,--  10-12 @ 19:10  Urinalysis Basic - ( 07 Oct 2020 12:52 )    Color: Light Yellow / Appearance: Clear / S.013 / pH: x  Gluc: x / Ketone: Negative  / Bili: Negative / Urobili: Negative   Blood: x / Protein: 30 mg/dL / Nitrite: Negative   Leuk Esterase: Moderate / RBC: x / WBC 15 /HPF   Sq Epi: x / Non Sq Epi: x / Bacteria: x    CAPILLARY BLOOD GLUCOSE          RADIOLOGY & ADDITIONAL TESTS:    Imaging Personally Reviewed:    Consultant(s) Notes Reviewed:  renal, cards     Care Discussed with Consultants/Other Providers:

## 2020-10-12 NOTE — PROGRESS NOTE ADULT - SUBJECTIVE AND OBJECTIVE BOX
UROLOGY DAILY PROGRESS NOTE:     Subjective:    Patient feels well. Tolerating gaston catheter.    Objective:    NAD, awake and alert  Respirations nonlabored  Abdomen soft, nontender, nondistended  Gaston urine clear    MEDICATIONS  (STANDING):  finasteride 5 milliGRAM(s) Oral daily  heparin   Injectable 5000 Unit(s) SubCutaneous every 8 hours  influenza   Vaccine 0.5 milliLiter(s) IntraMuscular once  labetalol 100 milliGRAM(s) Oral two times a day  polyethylene glycol 3350 17 Gram(s) Oral daily  senna 2 Tablet(s) Oral at bedtime  sodium chloride 0.9%. 1000 milliLiter(s) (50 mL/Hr) IV Continuous <Continuous>  tamsulosin 0.4 milliGRAM(s) Oral at bedtime    MEDICATIONS  (PRN):      Vital Signs Last 24 Hrs  T(C): 36.4 (12 Oct 2020 04:13), Max: 36.7 (11 Oct 2020 12:44)  T(F): 97.6 (12 Oct 2020 04:13), Max: 98.1 (11 Oct 2020 12:44)  HR: 77 (12 Oct 2020 04:13) (72 - 77)  BP: 161/90 (12 Oct 2020 04:13) (132/75 - 161/90)  BP(mean): --  RR: 18 (12 Oct 2020 04:13) (18 - 19)  SpO2: 97% (12 Oct 2020 04:13) (97% - 97%)    I&O's Detail    11 Oct 2020 07:01  -  12 Oct 2020 07:00  --------------------------------------------------------  IN:    IV PiggyBack: 100 mL    Sodium Bicarbonate: 1800 mL  Total IN: 1900 mL    OUT:    Indwelling Catheter - Urethral (mL): 2700 mL    Oral Fluid: 0 mL  Total OUT: 2700 mL    Total NET: -800 mL      12 Oct 2020 07:01  -  12 Oct 2020 10:37  --------------------------------------------------------  IN:    Oral Fluid: 180 mL  Total IN: 180 mL    OUT:  Total OUT: 0 mL    Total NET: 180 mL          Daily     Daily Weight in k.6 (12 Oct 2020 04:13)    LABS:                        7.9    5.46  )-----------( 143      ( 12 Oct 2020 06:47 )             24.2     10-12    141  |  105  |  63<H>  ----------------------------<  99  3.3<L>   |  24  |  5.59<H>    Ca    8.2<L>      12 Oct 2020 06:47  Phos  3.5     10-11  Mg     1.8     1012        Urinalysis Basic - ( 11 Oct 2020 18:37 )    Color: Light Yellow / Appearance: Slightly Turbid / S.011 / pH: x  Gluc: x / Ketone: Negative  / Bili: Negative / Urobili: Negative   Blood: x / Protein: 30 mg/dL / Nitrite: Negative   Leuk Esterase: Large / RBC: 23 /hpf / WBC 82 /HPF   Sq Epi: x / Non Sq Epi: 0 /hpf / Bacteria: Few

## 2020-10-12 NOTE — PROGRESS NOTE ADULT - SUBJECTIVE AND OBJECTIVE BOX
[facility] [place] [facility]   MD GABO CAMEJO MD RUORU WONG, PA    TEL:  OFFICE: [phone]  DR KIM CELL: [phone]  JEROME DSOUZA CELL: [phone]  DR. SINGH CELL: [phone]  DR. JACOB CELL: [phone]    FROM 5 PM - 7 AM PLEASE CALL ANSWERING SERVICE: [phone]    RENAL FOLLOW UP NOTE--Date of Service 10-12-20 @ 09:40  --------------------------------------------------------------------------------  HPI:      Pt seen and examined at bedside.   Denies SOB, chest pain     PAST HISTORY  --------------------------------------------------------------------------------  No significant changes to PMH, PSH, FHx, SHx, unless otherwise noted    ALLERGIES & MEDICATIONS  --------------------------------------------------------------------------------  Allergies    penicillin (Rash)    Intolerances      Standing Inpatient Medications  finasteride 5 milliGRAM(s) Oral daily  heparin   Injectable 5000 Unit(s) SubCutaneous every 8 hours  influenza   Vaccine 0.5 milliLiter(s) IntraMuscular once  labetalol 100 milliGRAM(s) Oral two times a day  polyethylene glycol 3350 17 Gram(s) Oral daily  potassium chloride    Tablet ER 20 milliEquivalent(s) Oral once  senna 2 Tablet(s) Oral at bedtime  sodium bicarbonate  Infusion 0.064 mEq/kG/Hr IV Continuous <Continuous>  tamsulosin 0.4 milliGRAM(s) Oral at bedtime    PRN Inpatient Medications      REVIEW OF SYSTEMS  --------------------------------------------------------------------------------  General: no fever  CVS: no chest pain  RESP: no sob, no cough  ABD: no abdominal pain  MSK: no edema     VITALS/PHYSICAL EXAM  --------------------------------------------------------------------------------  T(C): 36.4 (10-12-20 @ 04:13), Max: 36.7 (10-11-20 @ 12:44)  HR: 77 (10-12-20 @ 04:13) (72 - 77)  BP: 161/90 (10-12-20 @ 04:13) (132/75 - 161/90)  RR: 18 (10-12-20 @ 04:13) (18 - 19)  SpO2: 97% (10-12-20 @ 04:13) (97% - 97%)  Wt(kg): --        10-11-20 @ 07:01  -  10-12-20 @ 07:00  --------------------------------------------------------  IN: 1900 mL / OUT: 2700 mL / NET: -800 mL      Physical Exam:  	Gen: NAD  	HEENT: MMM  	Pulm: CTA B/L  	CV: S1S2  	Abd: Soft, +BS  	Ext: No LE edema B/L                      Neuro: Awake   	Skin: Warm and Dry   	Vascular access: no hd catheeter          HEMALATHA gaston  LABS/STUDIES  --------------------------------------------------------------------------------              7.9    5.46  >-----------<  143      [10-12-20 @ 06:47]              24.2     141  |  105  |  63  ----------------------------<  99      [10-12-20 @ 06:47]  3.3   |  24  |  5.59        Ca     8.2     [10-12-20 @ 06:47]      Mg     1.8     [10-12-20 @ 06:47]      Phos  3.5     [10-11-20 @ 07:25]            Creatinine Trend:  SCr 5.59 [10-12 @ 06:47]  SCr 5.57 [10-12 @ 00:29]  SCr 5.90 [10-11 @ 07:25]  SCr 6.43 [10-10 @ 17:22]  SCr 7.06 [10-10 @ 06:53]    Urinalysis - [10-11-20 @ 18:37]      Color Light Yellow / Appearance Slightly Turbid / SG 1.011 / pH 8.0      Gluc Trace / Ketone Negative  / Bili Negative / Urobili Negative       Blood Small / Protein 30 mg/dL / Leuk Est Large / Nitrite Negative      RBC 23 / WBC 82 / Hyaline 5 / Gran  / Sq Epi  / Non Sq Epi 0 / Bacteria Few      Iron 38, TIBC 205, %sat 19      [10-11-20 @ 09:26]  Ferritin 601      [10-11-20 @ 09:26]  TSH 1.01      [10-08-20 @ 02:55]    HCV 0.13, Nonreact      [10-08-20 @ 10:59]

## 2020-10-12 NOTE — PROGRESS NOTE ADULT - SUBJECTIVE AND OBJECTIVE BOX
Cardiovascular Disease Progress Note    Overnight events: No acute events overnight. Patient denies chest pain or SOB.    Otherwise review of systems negative    Objective Findings:  T(C): 36.4 (10-12-20 @ 04:13), Max: 36.7 (10-11-20 @ 12:44)  HR: 77 (10-12-20 @ 04:13) (72 - 77)  BP: 161/90 (10-12-20 @ 04:13) (132/75 - 161/90)  RR: 18 (10-12-20 @ 04:13) (18 - 19)  SpO2: 97% (10-12-20 @ 04:13) (97% - 97%)  Wt(kg): --  Daily     Daily Weight in k.6 (12 Oct 2020 04:13)      Physical Exam:  Gen: NAD; Patient resting comfortably  HEENT: EOMI, Normocephalic/ atraumatic  CV: RRR, normal S1 + S2, no m/r/g  Lungs:  Normal respiratory effort; clear to auscultation bilaterally  Abd: soft, non-tender; bowel sounds present  Ext: No edema; warm and well perfused    Telemetry: Sinus    Laboratory Data:                        7.9    5.46  )-----------( 143      ( 12 Oct 2020 06:47 )             24.2     10-12    141  |  105  |  63<H>  ----------------------------<  99  3.3<L>   |  24  |  5.59<H>    Ca    8.2<L>      12 Oct 2020 06:47  Phos  3.5     10-11  Mg     1.8     10-12                Inpatient Medications:  MEDICATIONS  (STANDING):  finasteride 5 milliGRAM(s) Oral daily  heparin   Injectable 5000 Unit(s) SubCutaneous every 8 hours  influenza   Vaccine 0.5 milliLiter(s) IntraMuscular once  labetalol 100 milliGRAM(s) Oral two times a day  polyethylene glycol 3350 17 Gram(s) Oral daily  potassium chloride    Tablet ER 20 milliEquivalent(s) Oral once  senna 2 Tablet(s) Oral at bedtime  sodium bicarbonate  Infusion 0.064 mEq/kG/Hr (75 mL/Hr) IV Continuous <Continuous>  tamsulosin 0.4 milliGRAM(s) Oral at bedtime      Assessment:  75 year old man with CAD s/p PCI several years ago, HTN, and BPH presents with obstructive VENUS and a-fib with RVR.     Plan of Care:    #A-fib with RVR-  Driven by obstructive uropathy and profound renal injury in the ED.  No further a-fib noted after patient was admitted.   Given the transient nature of his a-fib, I would hold off on anticoagulation.   Echo reviewed- TR with mild pHTN noted.     #Elevated troponins-  Secondary to reduced creatinine clearance.  No plan for coronary ischemic work up at this time given VENUS.    #CAD s/p PCI-  PCI several years ago.   Unclear why patient is not on antiplatelet therapy.  Start ASA 81 mg, as there is no plan for urologic intervention at this time.       Over 25 minutes spent on total encounter; more than 50% of the visit was spent counseling and/or coordinating care by the attending physician.      Lionel Miller MD [facility]  Cardiovascular Disease  [phone]

## 2020-10-13 LAB
ANION GAP SERPL CALC-SCNC: 10 MMOL/L — SIGNIFICANT CHANGE UP (ref 5–17)
ANION GAP SERPL CALC-SCNC: 14 MMOL/L — SIGNIFICANT CHANGE UP (ref 5–17)
BUN SERPL-MCNC: 58 MG/DL — HIGH (ref 7–23)
BUN SERPL-MCNC: 60 MG/DL — HIGH (ref 7–23)
CALCIUM SERPL-MCNC: 7.7 MG/DL — LOW (ref 8.4–10.5)
CALCIUM SERPL-MCNC: 7.9 MG/DL — LOW (ref 8.4–10.5)
CHLORIDE SERPL-SCNC: 107 MMOL/L — SIGNIFICANT CHANGE UP (ref 96–108)
CHLORIDE SERPL-SCNC: 108 MMOL/L — SIGNIFICANT CHANGE UP (ref 96–108)
CO2 SERPL-SCNC: 19 MMOL/L — LOW (ref 22–31)
CO2 SERPL-SCNC: 22 MMOL/L — SIGNIFICANT CHANGE UP (ref 22–31)
CREAT SERPL-MCNC: 4.78 MG/DL — HIGH (ref 0.5–1.3)
CREAT SERPL-MCNC: 5.11 MG/DL — HIGH (ref 0.5–1.3)
GLUCOSE SERPL-MCNC: 102 MG/DL — HIGH (ref 70–99)
GLUCOSE SERPL-MCNC: 107 MG/DL — HIGH (ref 70–99)
HCT VFR BLD CALC: 23.2 % — LOW (ref 39–50)
HGB BLD-MCNC: 7.4 G/DL — LOW (ref 13–17)
MAGNESIUM SERPL-MCNC: 1.6 MG/DL — SIGNIFICANT CHANGE UP (ref 1.6–2.6)
MCHC RBC-ENTMCNC: 29.2 PG — SIGNIFICANT CHANGE UP (ref 27–34)
MCHC RBC-ENTMCNC: 31.9 GM/DL — LOW (ref 32–36)
MCV RBC AUTO: 91.7 FL — SIGNIFICANT CHANGE UP (ref 80–100)
NRBC # BLD: 0 /100 WBCS — SIGNIFICANT CHANGE UP (ref 0–0)
PLATELET # BLD AUTO: 146 K/UL — LOW (ref 150–400)
POTASSIUM SERPL-MCNC: 3.7 MMOL/L — SIGNIFICANT CHANGE UP (ref 3.5–5.3)
POTASSIUM SERPL-MCNC: 3.9 MMOL/L — SIGNIFICANT CHANGE UP (ref 3.5–5.3)
POTASSIUM SERPL-SCNC: 3.7 MMOL/L — SIGNIFICANT CHANGE UP (ref 3.5–5.3)
POTASSIUM SERPL-SCNC: 3.9 MMOL/L — SIGNIFICANT CHANGE UP (ref 3.5–5.3)
RBC # BLD: 2.53 M/UL — LOW (ref 4.2–5.8)
RBC # FLD: 11.9 % — SIGNIFICANT CHANGE UP (ref 10.3–14.5)
SODIUM SERPL-SCNC: 139 MMOL/L — SIGNIFICANT CHANGE UP (ref 135–145)
SODIUM SERPL-SCNC: 141 MMOL/L — SIGNIFICANT CHANGE UP (ref 135–145)
WBC # BLD: 5.33 K/UL — SIGNIFICANT CHANGE UP (ref 3.8–10.5)
WBC # FLD AUTO: 5.33 K/UL — SIGNIFICANT CHANGE UP (ref 3.8–10.5)

## 2020-10-13 RX ORDER — MAGNESIUM SULFATE 500 MG/ML
1 VIAL (ML) INJECTION ONCE
Refills: 0 | Status: COMPLETED | OUTPATIENT
Start: 2020-10-13 | End: 2020-10-13

## 2020-10-13 RX ADMIN — POLYETHYLENE GLYCOL 3350 17 GRAM(S): 17 POWDER, FOR SOLUTION ORAL at 17:25

## 2020-10-13 RX ADMIN — HEPARIN SODIUM 5000 UNIT(S): 5000 INJECTION INTRAVENOUS; SUBCUTANEOUS at 23:19

## 2020-10-13 RX ADMIN — Medication 100 MILLIGRAM(S): at 17:24

## 2020-10-13 RX ADMIN — Medication 100 MILLIGRAM(S): at 05:32

## 2020-10-13 RX ADMIN — SODIUM CHLORIDE 50 MILLILITER(S): 9 INJECTION INTRAMUSCULAR; INTRAVENOUS; SUBCUTANEOUS at 05:40

## 2020-10-13 RX ADMIN — SENNA PLUS 2 TABLET(S): 8.6 TABLET ORAL at 23:19

## 2020-10-13 RX ADMIN — TAMSULOSIN HYDROCHLORIDE 0.4 MILLIGRAM(S): 0.4 CAPSULE ORAL at 23:19

## 2020-10-13 RX ADMIN — FINASTERIDE 5 MILLIGRAM(S): 5 TABLET, FILM COATED ORAL at 23:19

## 2020-10-13 RX ADMIN — HEPARIN SODIUM 5000 UNIT(S): 5000 INJECTION INTRAVENOUS; SUBCUTANEOUS at 13:17

## 2020-10-13 RX ADMIN — Medication 100 GRAM(S): at 09:13

## 2020-10-13 RX ADMIN — HEPARIN SODIUM 5000 UNIT(S): 5000 INJECTION INTRAVENOUS; SUBCUTANEOUS at 05:32

## 2020-10-13 NOTE — DIETITIAN INITIAL EVALUATION ADULT. - PERTINENT MEDS FT
MEDICATIONS  (STANDING):  finasteride 5 milliGRAM(s) Oral daily  heparin   Injectable 5000 Unit(s) SubCutaneous every 8 hours  influenza   Vaccine 0.5 milliLiter(s) IntraMuscular once  labetalol 100 milliGRAM(s) Oral two times a day  polyethylene glycol 3350 17 Gram(s) Oral daily  senna 2 Tablet(s) Oral at bedtime  sodium chloride 0.9%. 1000 milliLiter(s) (50 mL/Hr) IV Continuous <Continuous>  tamsulosin 0.4 milliGRAM(s) Oral at bedtime    MEDICATIONS  (PRN):

## 2020-10-13 NOTE — DIETITIAN INITIAL EVALUATION ADULT. - OTHER INFO
Reason for admission : Patient was sent by his pmd for abnormal labs  Diet PTA : cottage cheese, cereal, juice, bread and butter for breakfast, soup, meat, chicken, fish, fruit for lunch. he has 2 meals daily. he reports being retired so he does not snack.   Intake : 100% as per pt  Denies nausea/vomit :  Denies difficulty chewing /swallow :  Denies diarrhea/constipation:  Last BM : yesterday  NKFA  IBW +/- 10%= 166pounds  Ht: 70"  Ht taken from pt  Usual Weight PTA: 190pounds  BMI: 26.1  BMI calculated using wt from flow sheet  BMI calculated using ht from pt  Education Provided : VENUS Nutrition Therapy  pressure injury: none  edema: none

## 2020-10-13 NOTE — PROGRESS NOTE ADULT - ASSESSMENT
75 y.o. male history of CAD with a stent not currently on blood thinners sent in from his PCP for abnormal labs.  Pt has not been feeling well over the past week or so. c/o general weakness, poor appetite, n/v x1 week. denied fevers, or chills.  Does report decreased PO and decreased BM and urine output. US performed by ED showed significant b/l hydro and urinary retention. Gaston placed drained 500cc urine. seen by  s/p gaston  nephrology consulted for VENUS    VENUS  likely sec to obstructive uropathy  CT with b/l hydro s/p Gaston .  Pt non oliguric   Follow up Urology  Renal function improving  w/ indwelling gaston   Continue  NS at 50cc/hr   sonogram 10/9 with persistent severe hydronephrosis.   Follow up URology and IR for b/l PCN placement vs Stent   Pt will need further work up for bladder obstruction. f/u urology   can consider repeat sonogram to see if hydronephrosis is improving   If  patient with worsening  uremic symptoms patient would require HD. No plan for HD at present given improving renal function   Consent for hd obtained in chart, risk and benefit explained to patient. All questions answered.     B/l hydronephrosis   CT with b/l hydro s/p Gaston .  Pt non oliguric   Follow up Urology    Proteinuria  in setting of obstruction/ gaston  Repeat UA  Monitor at present

## 2020-10-13 NOTE — PROGRESS NOTE ADULT - SUBJECTIVE AND OBJECTIVE BOX
HPI:  75yr Male with  pmhx cad s/p stent 13 years ago, htn, BPH and nephrolithiasis 28 yrs ago s/p lithotripsy sent in by his PCP for abnormal labs.   Patient states that he is feels weak and tired, difficulty urinating over the past few weeks, associated with poor appetite and nausea. Denies dysuria, abdominal pain, fevers. No hx vomiting/ abdominal pain. Patient was on Flomax which was discontinued 3 months ago, started on Ditropan last week by his urologist. Patient went to see his pmd and was referred to ed for abnormal blood work- elevated Creatinine.     In the ed patient noted to have creatinine 9.98 in ED. US, CT abdomen shows  performed by ED showed significant b/l hydro and urinary retention with enlarged Prostate. . Garcia placed drained 500cc urine. Urology consulted- recommend conservative management.   In the ed patient noted to have A.fib with rvr- converted to sinus with fluids.   night and urinary incontinence. He was previously on flomax but stopped 3 months ago. Placed on finasteride by PMD, and more recently on ditropan by urologist Dr. Mclaughlin (07 Oct 2020 18:02)      Patient is a 75y old  Male who presents with a chief complaint of Patient was sent by his pmd for abnormal labs (08 Oct 2020 10:50)      SUBJECTIVE / OVERNIGHT EVENTS: Patient feels much improved.     T(C): 36.6 (10-13-20 @ 21:01), Max: 36.6 (10-13-20 @ 21:01)  HR: 73 (10-13-20 @ 21:01) (73 - 80)  BP: 123/71 (10-13-20 @ 21:01) (110/67 - 147/78)  RR: 18 (10-13-20 @ 21:01) (18 - 18)  SpO2: 96% (10-13-20 @ 21:01) (96% - 96%)      MEDICATIONS  (STANDING):  finasteride 5 milliGRAM(s) Oral daily  heparin   Injectable 5000 Unit(s) SubCutaneous every 8 hours  influenza   Vaccine 0.5 milliLiter(s) IntraMuscular once  labetalol 100 milliGRAM(s) Oral two times a day  polyethylene glycol 3350 17 Gram(s) Oral daily  senna 2 Tablet(s) Oral at bedtime  sodium chloride 0.9%. 1000 milliLiter(s) (50 mL/Hr) IV Continuous <Continuous>  tamsulosin 0.4 milliGRAM(s) Oral at bedtime    MEDICATIONS  (PRN):  PHYSICAL EXAM:  GENERAL: NAD, well-developed  HEAD:  Atraumatic, Normocephalic  EYES: EOMI, conjunctiva and sclera clear  NECK: Supple, No JVD  CHEST/LUNG: Clear to auscultation bilaterally; No wheeze  HEART: Regular rate and rhythm; No murmurs, rubs, or gallops  ABDOMEN: Soft, Nontender, Nondistended; Bowel sounds present  EXTREMITIES:  2+ Peripheral Pulses, No clubbing, cyanosis, or edema  PSYCH: AAOx3  NEUROLOGY: non-focal  SKIN: No rashes or lesions                                                         7.4    5.33  )-----------( 146      ( 13 Oct 2020 07:05 )             23.2               141|108|58<102  3.7|19|5.11  7.9,1.6,--  10-13 @ 07:05  Color: Light Yellow / Appearance: Clear / S.013 / pH: x  Gluc: x / Ketone: Negative  / Bili: Negative / Urobili: Negative   Blood: x / Protein: 30 mg/dL / Nitrite: Negative   Leuk Esterase: Moderate / RBC: x / WBC 15 /HPF   Sq Epi: x / Non Sq Epi: x / Bacteria: x    CAPILLARY BLOOD GLUCOSE          RADIOLOGY & ADDITIONAL TESTS:    Imaging Personally Reviewed:    Consultant(s) Notes Reviewed:  renal, cards     Care Discussed with Consultants/Other Providers:

## 2020-10-13 NOTE — DIETITIAN INITIAL EVALUATION ADULT. - ENERGY NEEDS
lower range used due to pt BMI in overweight range  pt with VENUS on CKD- protein needs are 0.8grams/kg

## 2020-10-13 NOTE — DIETITIAN INITIAL EVALUATION ADULT. - PERTINENT LABORATORY DATA
10-13 @ 07:05: Na 141, BUN 58<H>, Cr 5.11<H>, <H>, K+ 3.7,   10-12 @ 19:10: Na 143, BUN 62<H>, Cr 5.45<H>, <H>, K+ 3.7

## 2020-10-13 NOTE — DIETITIAN INITIAL EVALUATION ADULT. - ADD RECOMMEND
change diet to DASH, no concentrated phosphorous. discussed liberalizing fluid restriction with PA, recommended liberalize no concentrated K+ since pt potassium has been WNL or low during this admission. provided VENUS Nutrition Therapy-monitor need for reinforcement.  pending verification placed.

## 2020-10-13 NOTE — PROGRESS NOTE ADULT - SUBJECTIVE AND OBJECTIVE BOX
[facility] [place] [facility]   MD GABO CAMEJO MD RUORU WONG, PA    TEL:  OFFICE: [phone]  DR KIM CELL: [phone]  JEROME DSOUZA CELL: [phone]  DR. SINGH CELL: [phone]  DR. JACOB CELL: [phone]    FROM 5 PM - 7 AM PLEASE CALL ANSWERING SERVICE: [phone]    RENAL FOLLOW UP NOTE--Date of Service 10-13-20 @ 08:33  --------------------------------------------------------------------------------  HPI:      Pt seen and examined at bedside.   Denies SOB, chest pain     PAST HISTORY  --------------------------------------------------------------------------------  No significant changes to PMH, PSH, FHx, SHx, unless otherwise noted    ALLERGIES & MEDICATIONS  --------------------------------------------------------------------------------  Allergies    penicillin (Rash)    Intolerances      Standing Inpatient Medications  finasteride 5 milliGRAM(s) Oral daily  heparin   Injectable 5000 Unit(s) SubCutaneous every 8 hours  influenza   Vaccine 0.5 milliLiter(s) IntraMuscular once  labetalol 100 milliGRAM(s) Oral two times a day  polyethylene glycol 3350 17 Gram(s) Oral daily  senna 2 Tablet(s) Oral at bedtime  sodium chloride 0.9%. 1000 milliLiter(s) IV Continuous <Continuous>  tamsulosin 0.4 milliGRAM(s) Oral at bedtime    PRN Inpatient Medications      REVIEW OF SYSTEMS  --------------------------------------------------------------------------------  General: no fever  CVS: no chest pain  RESP: no sob, no cough  ABD: no abdominal pain  MSK: no edema     VITALS/PHYSICAL EXAM  --------------------------------------------------------------------------------  T(C): 36.8 (10-13-20 @ 04:17), Max: 36.9 (10-12-20 @ 21:00)  HR: 75 (10-13-20 @ 04:17) (67 - 76)  BP: 132/84 (10-13-20 @ 04:17) (122/73 - 151/90)  RR: 18 (10-13-20 @ 04:17) (18 - 18)  SpO2: 96% (10-13-20 @ 04:17) (96% - 96%)  Wt(kg): --        10-12-20 @ 07:01  -  10-13-20 @ 07:00  --------------------------------------------------------  IN: 2305 mL / OUT: 2351 mL / NET: -46 mL      Physical Exam:  	Gen: NAD  	HEENT: MMM  	Pulm: CTA B/L  	CV: S1S2  	Abd: Soft, +BS  	Ext: No LE edema B/L                      Neuro: Awake   	Skin: Warm and Dry   	Vascular access: no hd catheter          [facility]  LABS/STUDIES  --------------------------------------------------------------------------------              7.4    5.33  >-----------<  146      [10-13-20 @ 07:05]              23.2     141  |  108  |  58  ----------------------------<  102      [10-13-20 @ 07:05]  3.7   |  19  |  5.11        Ca     7.9     [10-13-20 @ 07:05]      Mg     1.6     [10-13-20 @ 07:05]            Creatinine Trend:  SCr 5.11 [10-13 @ 07:05]  SCr 5.45 [10-12 @ 19:10]  SCr 5.59 [10-12 @ 06:47]  SCr 5.57 [10-12 @ 00:29]  SCr 5.90 [10-11 @ 07:25]    Urinalysis - [10-11-20 @ 18:37]      Color Light Yellow / Appearance Slightly Turbid / SG 1.011 / pH 8.0      Gluc Trace / Ketone Negative  / Bili Negative / Urobili Negative       Blood Small / Protein 30 mg/dL / Leuk Est Large / Nitrite Negative      RBC 23 / WBC 82 / Hyaline 5 / Gran  / Sq Epi  / Non Sq Epi 0 / Bacteria Few      Iron 38, TIBC 205, %sat 19      [10-11-20 @ 09:26]  Ferritin 601      [10-11-20 @ 09:26]  TSH 1.01      [10-08-20 @ 02:55]    HCV 0.13, Nonreact      [10-08-20 @ 10:59]

## 2020-10-14 LAB
ANION GAP SERPL CALC-SCNC: 11 MMOL/L — SIGNIFICANT CHANGE UP (ref 5–17)
ANION GAP SERPL CALC-SCNC: 13 MMOL/L — SIGNIFICANT CHANGE UP (ref 5–17)
BUN SERPL-MCNC: 58 MG/DL — HIGH (ref 7–23)
BUN SERPL-MCNC: 61 MG/DL — HIGH (ref 7–23)
CALCIUM SERPL-MCNC: 7.9 MG/DL — LOW (ref 8.4–10.5)
CALCIUM SERPL-MCNC: 7.9 MG/DL — LOW (ref 8.4–10.5)
CHLORIDE SERPL-SCNC: 108 MMOL/L — SIGNIFICANT CHANGE UP (ref 96–108)
CHLORIDE SERPL-SCNC: 109 MMOL/L — HIGH (ref 96–108)
CO2 SERPL-SCNC: 20 MMOL/L — LOW (ref 22–31)
CO2 SERPL-SCNC: 21 MMOL/L — LOW (ref 22–31)
CREAT SERPL-MCNC: 4.63 MG/DL — HIGH (ref 0.5–1.3)
CREAT SERPL-MCNC: 4.72 MG/DL — HIGH (ref 0.5–1.3)
GLUCOSE SERPL-MCNC: 106 MG/DL — HIGH (ref 70–99)
GLUCOSE SERPL-MCNC: 93 MG/DL — SIGNIFICANT CHANGE UP (ref 70–99)
HCT VFR BLD CALC: 23.3 % — LOW (ref 39–50)
HGB BLD-MCNC: 7.2 G/DL — LOW (ref 13–17)
MAGNESIUM SERPL-MCNC: 1.8 MG/DL — SIGNIFICANT CHANGE UP (ref 1.6–2.6)
MCHC RBC-ENTMCNC: 28.9 PG — SIGNIFICANT CHANGE UP (ref 27–34)
MCHC RBC-ENTMCNC: 30.9 GM/DL — LOW (ref 32–36)
MCV RBC AUTO: 93.6 FL — SIGNIFICANT CHANGE UP (ref 80–100)
NRBC # BLD: 0 /100 WBCS — SIGNIFICANT CHANGE UP (ref 0–0)
PLATELET # BLD AUTO: 145 K/UL — LOW (ref 150–400)
POTASSIUM SERPL-MCNC: 3.8 MMOL/L — SIGNIFICANT CHANGE UP (ref 3.5–5.3)
POTASSIUM SERPL-MCNC: 4.1 MMOL/L — SIGNIFICANT CHANGE UP (ref 3.5–5.3)
POTASSIUM SERPL-SCNC: 3.8 MMOL/L — SIGNIFICANT CHANGE UP (ref 3.5–5.3)
POTASSIUM SERPL-SCNC: 4.1 MMOL/L — SIGNIFICANT CHANGE UP (ref 3.5–5.3)
RBC # BLD: 2.49 M/UL — LOW (ref 4.2–5.8)
RBC # FLD: 11.9 % — SIGNIFICANT CHANGE UP (ref 10.3–14.5)
SODIUM SERPL-SCNC: 141 MMOL/L — SIGNIFICANT CHANGE UP (ref 135–145)
SODIUM SERPL-SCNC: 141 MMOL/L — SIGNIFICANT CHANGE UP (ref 135–145)
WBC # BLD: 5.44 K/UL — SIGNIFICANT CHANGE UP (ref 3.8–10.5)
WBC # FLD AUTO: 5.44 K/UL — SIGNIFICANT CHANGE UP (ref 3.8–10.5)

## 2020-10-14 RX ORDER — ASPIRIN/CALCIUM CARB/MAGNESIUM 324 MG
81 TABLET ORAL DAILY
Refills: 0 | Status: DISCONTINUED | OUTPATIENT
Start: 2020-10-14 | End: 2020-10-20

## 2020-10-14 RX ADMIN — HEPARIN SODIUM 5000 UNIT(S): 5000 INJECTION INTRAVENOUS; SUBCUTANEOUS at 05:54

## 2020-10-14 RX ADMIN — HEPARIN SODIUM 5000 UNIT(S): 5000 INJECTION INTRAVENOUS; SUBCUTANEOUS at 21:07

## 2020-10-14 RX ADMIN — Medication 100 MILLIGRAM(S): at 17:23

## 2020-10-14 RX ADMIN — FINASTERIDE 5 MILLIGRAM(S): 5 TABLET, FILM COATED ORAL at 21:07

## 2020-10-14 RX ADMIN — Medication 100 MILLIGRAM(S): at 05:54

## 2020-10-14 RX ADMIN — HEPARIN SODIUM 5000 UNIT(S): 5000 INJECTION INTRAVENOUS; SUBCUTANEOUS at 13:20

## 2020-10-14 RX ADMIN — POLYETHYLENE GLYCOL 3350 17 GRAM(S): 17 POWDER, FOR SOLUTION ORAL at 17:23

## 2020-10-14 RX ADMIN — TAMSULOSIN HYDROCHLORIDE 0.4 MILLIGRAM(S): 0.4 CAPSULE ORAL at 21:06

## 2020-10-14 NOTE — PROGRESS NOTE ADULT - ASSESSMENT
75 year old man with VENUS (Cr 9.98), found to have severe b/l hydroureteronephrosis   Gaston in place  SCr persistently downtrending    - As patient has good urine output and SCr is persistently downtrending, would hold off on further intervention  - SCr may improve slowly if pt had long-standing outlet obstruction  - Trend SCr and monitor urine output  - Keep gaston catheter to drainage  - Appreciate nephrology recommendations

## 2020-10-14 NOTE — PROGRESS NOTE ADULT - SUBJECTIVE AND OBJECTIVE BOX
[facility]   MD GABO CAMEJO MD RUORU WONG, PA    TEL:  OFFICE: [phone]  DR KIM CELL: [phone]  JEROME DSOUZA CELL: [phone]  DR. SINGH CELL: [phone]  DR. JACOB CELL: [phone]    FROM 5 PM - 7 AM PLEASE CALL ANSWERING SERVICE: [phone]    RENAL FOLLOW UP NOTE--Date of Service 10-14-20 @ 08:42  --------------------------------------------------------------------------------  HPI:      Pt seen and examined at bedside.   Denies SOB, chest pain     PAST HISTORY  --------------------------------------------------------------------------------  No significant changes to PMH, PSH, FHx, SHx, unless otherwise noted    ALLERGIES & MEDICATIONS  --------------------------------------------------------------------------------  Allergies    penicillin (Rash)    Intolerances      Standing Inpatient Medications  finasteride 5 milliGRAM(s) Oral daily  heparin   Injectable 5000 Unit(s) SubCutaneous every 8 hours  influenza   Vaccine 0.5 milliLiter(s) IntraMuscular once  labetalol 100 milliGRAM(s) Oral two times a day  polyethylene glycol 3350 17 Gram(s) Oral daily  senna 2 Tablet(s) Oral at bedtime  sodium chloride 0.9%. 1000 milliLiter(s) IV Continuous <Continuous>  tamsulosin 0.4 milliGRAM(s) Oral at bedtime    PRN Inpatient Medications      REVIEW OF SYSTEMS  --------------------------------------------------------------------------------  General: no fever  CVS: no chest pain  RESP: no sob, no cough  ABD: no abdominal pain  MSK: no edema     VITALS/PHYSICAL EXAM  --------------------------------------------------------------------------------  T(C): 36.8 (10-14-20 @ 04:25), Max: 36.8 (10-14-20 @ 04:25)  HR: 71 (10-14-20 @ 04:25) (71 - 80)  BP: 143/86 (10-14-20 @ 04:25) (110/67 - 147/78)  RR: 18 (10-14-20 @ 04:25) (18 - 18)  SpO2: 97% (10-14-20 @ 04:25) (96% - 97%)  Wt(kg): --  Height (cm): 177.8 (10-13-20 @ 07:00)      10-13-20 @ 07:01  -  10-14-20 @ 07:00  --------------------------------------------------------  IN: 1500 mL / OUT: 1950 mL / NET: -450 mL      Physical Exam:  	Gen: NAD  	HEENT: MMM  	Pulm: CTA B/L  	CV: S1S2  	Abd: Soft, +BS  	Ext: No LE edema B/L                      Neuro: Awake   	Skin: Warm and Dry   	Vascular access: no hd catheter            gaston  LABS/STUDIES  --------------------------------------------------------------------------------              7.2    5.44  >-----------<  145      [10-14-20 @ 07:04]              23.3     141  |  108  |  58  ----------------------------<  93      [10-14-20 @ 07:04]  3.8   |  20  |  4.63        Ca     7.9     [10-14-20 @ 07:04]      Mg     1.8     [10-14-20 @ 07:04]            Creatinine Trend:  SCr 4.63 [10-14 @ 07:04]  SCr 4.78 [10-13 @ 22:51]  SCr 5.11 [10-13 @ 07:05]  SCr 5.45 [10-12 @ 19:10]  SCr 5.59 [10-12 @ 06:47]    Urinalysis - [10-11-20 @ 18:37]      Color Light Yellow / Appearance Slightly Turbid / SG 1.011 / pH 8.0      Gluc Trace / Ketone Negative  / Bili Negative / Urobili Negative       Blood Small / Protein 30 mg/dL / Leuk Est Large / Nitrite Negative      RBC 23 / WBC 82 / Hyaline 5 / Gran  / Sq Epi  / Non Sq Epi 0 / Bacteria Few      Iron 38, TIBC 205, %sat 19      [10-11-20 @ 09:26]  Ferritin 601      [10-11-20 @ 09:26]  TSH 1.01      [10-08-20 @ 02:55]    HCV 0.13, Nonreact      [10-08-20 @ 10:59]

## 2020-10-14 NOTE — PROGRESS NOTE ADULT - SUBJECTIVE AND OBJECTIVE BOX
HPI:  75yr Male with  pmhx cad s/p stent 13 years ago, htn, BPH and nephrolithiasis 28 yrs ago s/p lithotripsy sent in by his PCP for abnormal labs.   Patient states that he is feels weak and tired, difficulty urinating over the past few weeks, associated with poor appetite and nausea. Denies dysuria, abdominal pain, fevers. No hx vomiting/ abdominal pain. Patient was on Flomax which was discontinued 3 months ago, started on Ditropan last week by his urologist. Patient went to see his pmd and was referred to ed for abnormal blood work- elevated Creatinine.     In the ed patient noted to have creatinine 9.98 in ED. US, CT abdomen shows  performed by ED showed significant b/l hydro and urinary retention with enlarged Prostate. . Garcia placed drained 500cc urine. Urology consulted- recommend conservative management.   In the ed patient noted to have A.fib with rvr- converted to sinus with fluids.   night and urinary incontinence. He was previously on flomax but stopped 3 months ago. Placed on finasteride by PMD, and more recently on ditropan by urologist Dr. Mclaughlin (07 Oct 2020 18:02)      Patient is a 75y old  Male who presents with a chief complaint of Patient was sent by his pmd for abnormal labs (08 Oct 2020 10:50)      SUBJECTIVE / OVERNIGHT EVENTS: Patient feels much improved.   T(C): 36.8 (10-14-20 @ 21:12), Max: 36.8 (10-14-20 @ 21:12)  HR: 67 (10-14-20 @ 21:12) (65 - 78)  BP: 120/74 (10-14-20 @ 21:12) (114/72 - 153/91)  RR: 18 (10-14-20 @ 21:12) (17 - 18)  SpO2: 97% (10-14-20 @ 21:12) (94% - 97%)      MEDICATIONS  (STANDING):  finasteride 5 milliGRAM(s) Oral daily  heparin   Injectable 5000 Unit(s) SubCutaneous every 8 hours  influenza   Vaccine 0.5 milliLiter(s) IntraMuscular once  labetalol 100 milliGRAM(s) Oral two times a day  polyethylene glycol 3350 17 Gram(s) Oral daily  senna 2 Tablet(s) Oral at bedtime  sodium chloride 0.9%. 1000 milliLiter(s) (50 mL/Hr) IV Continuous <Continuous>  tamsulosin 0.4 milliGRAM(s) Oral at bedtime    MEDICATIONS  (PRN):  PHYSICAL EXAM:  GENERAL: NAD, well-developed  HEAD:  Atraumatic, Normocephalic  EYES: EOMI, conjunctiva and sclera clear  NECK: Supple, No JVD  CHEST/LUNG: Clear to auscultation bilaterally; No wheeze  HEART: Regular rate and rhythm; No murmurs, rubs, or gallops  ABDOMEN: Soft, Nontender, Nondistended; Bowel sounds present  EXTREMITIES:  2+ Peripheral Pulses, No clubbing, cyanosis, or edema  PSYCH: AAOx3  NEUROLOGY: non-focal  SKIN: No rashes or lesions                                                 7.2    5.44  )-----------( 145      ( 14 Oct 2020 07:04 )             23.3               141|109|61<106  4.1|21|4.72  7.9,--,--  10-14 @ 22:58  141|108|58<93  3.8|20|4.63  7.9,1.8,--  10-14 @ 07:04      CAPILLARY BLOOD GLUCOSE          RADIOLOGY & ADDITIONAL TESTS:    Imaging Personally Reviewed:    Consultant(s) Notes Reviewed:  renal, cards     Care Discussed with Consultants/Other Providers:

## 2020-10-14 NOTE — PROGRESS NOTE ADULT - SUBJECTIVE AND OBJECTIVE BOX
Cardiovascular Disease Progress Note    Overnight events: No acute events overnight. Mr. Raymundo denies chest pain or SOB.    Otherwise review of systems negative    Objective Findings:  T(C): 36.8 (10-14-20 @ 04:25), Max: 36.8 (10-14-20 @ 04:25)  HR: 71 (10-14-20 @ 04:25) (71 - 80)  BP: 143/86 (10-14-20 @ 04:25) (110/67 - 147/78)  RR: 18 (10-14-20 @ 04:25) (18 - 18)  SpO2: 97% (10-14-20 @ 04:25) (96% - 97%)  Wt(kg): --  Daily     Daily Weight in k.7 (14 Oct 2020 02:17)      Physical Exam:  Gen: NAD; Patient resting comfortably  HEENT: EOMI, Normocephalic/ atraumatic  CV: RRR, normal S1 + S2, no m/r/g  Lungs:  Normal respiratory effort; clear to auscultation bilaterally  Abd: soft, non-tender; bowel sounds present  Ext: No edema; warm and well perfused    Telemetry: Sinus    Laboratory Data:                        7.4    5.33  )-----------( 146      ( 13 Oct 2020 07:05 )             23.2     10-13    139  |  107  |  60<H>  ----------------------------<  107<H>  3.9   |  22  |  4.78<H>    Ca    7.7<L>      13 Oct 2020 22:51  Mg     1.6     10-13                Inpatient Medications:  MEDICATIONS  (STANDING):  finasteride 5 milliGRAM(s) Oral daily  heparin   Injectable 5000 Unit(s) SubCutaneous every 8 hours  influenza   Vaccine 0.5 milliLiter(s) IntraMuscular once  labetalol 100 milliGRAM(s) Oral two times a day  polyethylene glycol 3350 17 Gram(s) Oral daily  senna 2 Tablet(s) Oral at bedtime  sodium chloride 0.9%. 1000 milliLiter(s) (50 mL/Hr) IV Continuous <Continuous>  tamsulosin 0.4 milliGRAM(s) Oral at bedtime      Assessment: 75 year old man with CAD s/p PCI several years ago, HTN, and BPH presents with obstructive VENUS and a-fib with RVR.     Plan of Care:    #A-fib with RVR-  Driven by obstructive uropathy and profound renal injury in the ED.  No further a-fib noted after Mr. Raymundo was admitted.   Given the transient nature of his a-fib, I would hold off on anticoagulation.   Echo reviewed- TR with mild pHTN noted.     #Elevated troponins-  Secondary to reduced creatinine clearance.  No plan for coronary ischemic work up at this time given VENUS.    #CAD s/p PCI-  PCI several years ago.   Start ASA 81 mg, as there is no plan for urologic intervention at this time.           Over 25 minutes spent on total encounter; more than 50% of the visit was spent counseling and/or coordinating care by the attending physician.      Lionel Miller MD [facility]  Cardiovascular Disease  [phone] Cardiovascular Disease Progress Note    Overnight events: No acute events overnight. Mr. Raymundo denies chest pain or SOB.    Otherwise review of systems negative    Objective Findings:  T(C): 36.8 (10-14-20 @ 04:25), Max: 36.8 (10-14-20 @ 04:25)  HR: 71 (10-14-20 @ 04:25) (71 - 80)  BP: 143/86 (10-14-20 @ 04:25) (110/67 - 147/78)  RR: 18 (10-14-20 @ 04:25) (18 - 18)  SpO2: 97% (10-14-20 @ 04:25) (96% - 97%)  Wt(kg): --  Daily     Daily Weight in k.7 (14 Oct 2020 02:17)      Physical Exam:  Gen: NAD; Patient resting comfortably  HEENT: EOMI, Normocephalic/ atraumatic  CV: RRR, normal S1 + S2, no m/r/g  Lungs:  Normal respiratory effort; clear to auscultation bilaterally  Abd: soft, non-tender; bowel sounds present  Ext: No edema; warm and well perfused    Telemetry: N/A    Laboratory Data:                        7.4    5.33  )-----------( 146      ( 13 Oct 2020 07:05 )             23.2     10-13    139  |  107  |  60<H>  ----------------------------<  107<H>  3.9   |  22  |  4.78<H>    Ca    7.7<L>      13 Oct 2020 22:51  Mg     1.6     10-13                Inpatient Medications:  MEDICATIONS  (STANDING):  finasteride 5 milliGRAM(s) Oral daily  heparin   Injectable 5000 Unit(s) SubCutaneous every 8 hours  influenza   Vaccine 0.5 milliLiter(s) IntraMuscular once  labetalol 100 milliGRAM(s) Oral two times a day  polyethylene glycol 3350 17 Gram(s) Oral daily  senna 2 Tablet(s) Oral at bedtime  sodium chloride 0.9%. 1000 milliLiter(s) (50 mL/Hr) IV Continuous <Continuous>  tamsulosin 0.4 milliGRAM(s) Oral at bedtime      Assessment: 75 year old man with CAD s/p PCI several years ago, HTN, and BPH presents with obstructive VENUS and a-fib with RVR.     Plan of Care:    #A-fib with RVR-  Driven by obstructive uropathy and profound renal injury in the ED.  No further a-fib was noted after Mr. Raymundo was admitted.   Given the transient nature of his a-fib, I would hold off on anticoagulation.   Echo reviewed- TR with mild pHTN noted.     #Elevated troponins-  Secondary to reduced creatinine clearance.  No plan for coronary ischemic work up at this time given VENUS.    #CAD s/p PCI-  PCI several years ago.   Start ASA 81 mg, as there is no plan for urologic intervention at this time.           Over 25 minutes spent on total encounter; more than 50% of the visit was spent counseling and/or coordinating care by the attending physician.      Lionel Miller MD [facility]  Cardiovascular Disease  [phone]

## 2020-10-14 NOTE — PROGRESS NOTE ADULT - SUBJECTIVE AND OBJECTIVE BOX
UROLOGY DAILY PROGRESS NOTE:     Subjective:    Patient feels well. Mild urethral burning from gaston.   No flank pain.    Objective:    NAD, awake and alert  Respirations nonlabored  Abdomen soft, nontender, nondistended  Urine clear    MEDICATIONS  (STANDING):  finasteride 5 milliGRAM(s) Oral daily  heparin   Injectable 5000 Unit(s) SubCutaneous every 8 hours  influenza   Vaccine 0.5 milliLiter(s) IntraMuscular once  labetalol 100 milliGRAM(s) Oral two times a day  polyethylene glycol 3350 17 Gram(s) Oral daily  senna 2 Tablet(s) Oral at bedtime  sodium chloride 0.9%. 1000 milliLiter(s) (50 mL/Hr) IV Continuous <Continuous>  tamsulosin 0.4 milliGRAM(s) Oral at bedtime    MEDICATIONS  (PRN):      Vital Signs Last 24 Hrs  T(C): 36.3 (14 Oct 2020 12:18), Max: 36.8 (14 Oct 2020 04:25)  T(F): 97.4 (14 Oct 2020 12:18), Max: 98.2 (14 Oct 2020 04:25)  HR: 65 (14 Oct 2020 12:18) (65 - 73)  BP: 147/80 (14 Oct 2020 12:18) (123/71 - 147/80)  BP(mean): --  RR: 18 (14 Oct 2020 12:18) (18 - 18)  SpO2: 94% (14 Oct 2020 12:18) (94% - 97%)    I&O's Detail    13 Oct 2020 07:01  -  14 Oct 2020 07:00  --------------------------------------------------------  IN:    IV PiggyBack: 100 mL    Oral Fluid: 800 mL    sodium chloride 0.9%: 600 mL  Total IN: 1500 mL    OUT:    Indwelling Catheter - Urethral (mL): 1950 mL  Total OUT: 1950 mL    Total NET: -450 mL      14 Oct 2020 07:01  -  14 Oct 2020 14:25  --------------------------------------------------------  IN:    Oral Fluid: 480 mL  Total IN: 480 mL    OUT:  Total OUT: 0 mL    Total NET: 480 mL      Daily Weight in k.7 (14 Oct 2020 02:17)    LABS:                        7.2    5.44  )-----------( 145      ( 14 Oct 2020 07:04 )             23.3     10-14    141  |  108  |  58<H>  ----------------------------<  93  3.8   |  20<L>  |  4.63<H>    Ca    7.9<L>      14 Oct 2020 07:04  Mg     1.8     10-14

## 2020-10-15 ENCOUNTER — TRANSCRIPTION ENCOUNTER (OUTPATIENT)
Age: 75
End: 2020-10-15

## 2020-10-15 LAB
ANION GAP SERPL CALC-SCNC: 11 MMOL/L — SIGNIFICANT CHANGE UP (ref 5–17)
ANION GAP SERPL CALC-SCNC: 12 MMOL/L — SIGNIFICANT CHANGE UP (ref 5–17)
BUN SERPL-MCNC: 60 MG/DL — HIGH (ref 7–23)
BUN SERPL-MCNC: 64 MG/DL — HIGH (ref 7–23)
CALCIUM SERPL-MCNC: 8.2 MG/DL — LOW (ref 8.4–10.5)
CALCIUM SERPL-MCNC: 8.2 MG/DL — LOW (ref 8.4–10.5)
CHLORIDE SERPL-SCNC: 108 MMOL/L — SIGNIFICANT CHANGE UP (ref 96–108)
CHLORIDE SERPL-SCNC: 109 MMOL/L — HIGH (ref 96–108)
CO2 SERPL-SCNC: 19 MMOL/L — LOW (ref 22–31)
CO2 SERPL-SCNC: 20 MMOL/L — LOW (ref 22–31)
CREAT SERPL-MCNC: 4.64 MG/DL — HIGH (ref 0.5–1.3)
CREAT SERPL-MCNC: 4.73 MG/DL — HIGH (ref 0.5–1.3)
GLUCOSE SERPL-MCNC: 114 MG/DL — HIGH (ref 70–99)
GLUCOSE SERPL-MCNC: 99 MG/DL — SIGNIFICANT CHANGE UP (ref 70–99)
HCT VFR BLD CALC: 24 % — LOW (ref 39–50)
HGB BLD-MCNC: 7.6 G/DL — LOW (ref 13–17)
MCHC RBC-ENTMCNC: 29 PG — SIGNIFICANT CHANGE UP (ref 27–34)
MCHC RBC-ENTMCNC: 31.7 GM/DL — LOW (ref 32–36)
MCV RBC AUTO: 91.6 FL — SIGNIFICANT CHANGE UP (ref 80–100)
NRBC # BLD: 0 /100 WBCS — SIGNIFICANT CHANGE UP (ref 0–0)
PLATELET # BLD AUTO: 150 K/UL — SIGNIFICANT CHANGE UP (ref 150–400)
POTASSIUM SERPL-MCNC: 4.1 MMOL/L — SIGNIFICANT CHANGE UP (ref 3.5–5.3)
POTASSIUM SERPL-MCNC: 4.6 MMOL/L — SIGNIFICANT CHANGE UP (ref 3.5–5.3)
POTASSIUM SERPL-SCNC: 4.1 MMOL/L — SIGNIFICANT CHANGE UP (ref 3.5–5.3)
POTASSIUM SERPL-SCNC: 4.6 MMOL/L — SIGNIFICANT CHANGE UP (ref 3.5–5.3)
RBC # BLD: 2.62 M/UL — LOW (ref 4.2–5.8)
RBC # FLD: 11.9 % — SIGNIFICANT CHANGE UP (ref 10.3–14.5)
SODIUM SERPL-SCNC: 139 MMOL/L — SIGNIFICANT CHANGE UP (ref 135–145)
SODIUM SERPL-SCNC: 140 MMOL/L — SIGNIFICANT CHANGE UP (ref 135–145)
WBC # BLD: 5.24 K/UL — SIGNIFICANT CHANGE UP (ref 3.8–10.5)
WBC # FLD AUTO: 5.24 K/UL — SIGNIFICANT CHANGE UP (ref 3.8–10.5)

## 2020-10-15 PROCEDURE — 76770 US EXAM ABDO BACK WALL COMP: CPT | Mod: 26

## 2020-10-15 RX ADMIN — POLYETHYLENE GLYCOL 3350 17 GRAM(S): 17 POWDER, FOR SOLUTION ORAL at 17:11

## 2020-10-15 RX ADMIN — Medication 100 MILLIGRAM(S): at 17:11

## 2020-10-15 RX ADMIN — Medication 81 MILLIGRAM(S): at 14:43

## 2020-10-15 RX ADMIN — TAMSULOSIN HYDROCHLORIDE 0.4 MILLIGRAM(S): 0.4 CAPSULE ORAL at 21:49

## 2020-10-15 RX ADMIN — SENNA PLUS 2 TABLET(S): 8.6 TABLET ORAL at 21:49

## 2020-10-15 RX ADMIN — HEPARIN SODIUM 5000 UNIT(S): 5000 INJECTION INTRAVENOUS; SUBCUTANEOUS at 14:41

## 2020-10-15 RX ADMIN — HEPARIN SODIUM 5000 UNIT(S): 5000 INJECTION INTRAVENOUS; SUBCUTANEOUS at 21:51

## 2020-10-15 RX ADMIN — HEPARIN SODIUM 5000 UNIT(S): 5000 INJECTION INTRAVENOUS; SUBCUTANEOUS at 05:54

## 2020-10-15 RX ADMIN — Medication 100 MILLIGRAM(S): at 05:55

## 2020-10-15 RX ADMIN — FINASTERIDE 5 MILLIGRAM(S): 5 TABLET, FILM COATED ORAL at 21:49

## 2020-10-15 NOTE — PROGRESS NOTE ADULT - ASSESSMENT
75 year old man with VENUS (Cr 9.98), found to have severe b/l hydroureteronephrosis   Gaston in place    SCr now with apparent pat to ~4.7    - Patient's good UOP argues against bilateral obstruction  - Persistent hydro likely structural in the setting of chronic obstruction  - As patient has good urine output, would hold off on further intervention  - Keep gaston catheter to drainage  - Appreciate nephrology recommendations  - While no intervention at this time, patient will need close followup with both nephrology and urology  - Patient should followup with Dr. Lam at [facility] in 10-14 days for repeat BMP and renal imaging  Discussed with Dr. Lam    [facility]  [address] [place] [address], [address] [address]  [phone]

## 2020-10-15 NOTE — DISCHARGE NOTE PROVIDER - CARE PROVIDERS DIRECT ADDRESSES
[email or web address]t [employer]"[email or web address] [email or web address]

## 2020-10-15 NOTE — DISCHARGE NOTE PROVIDER - NSDCHHNEEDSERVICE_GEN_ALL_CORE
Medication teaching and assessment/Teaching and training Medication teaching and assessment/Rehabilitation services/Teaching and training

## 2020-10-15 NOTE — PROGRESS NOTE ADULT - ASSESSMENT
75 y.o. male history of CAD with a stent not currently on blood thinners sent in from his PCP for abnormal labs.  Pt has not been feeling well over the past week or so. c/o general weakness, poor appetite, n/v x1 week. denied fevers, or chills.  Does report decreased PO and decreased BM and urine output. US performed by ED showed significant b/l hydro and urinary retention. Gaston placed drained 500cc urine. seen by  s/p gaston  nephrology consulted for VENUS    VENUS  likely sec to obstructive uropathy  CT with b/l hydro s/p Gaston .  Pt non oliguric   Follow up Urology  Renal function now stablized at 4.7   sonogram 10/9 with persistent severe hydronephrosis.   Follow up URology and IR for b/l PCN placement vs Stent   Pt will need further work up for bladder obstruction. f/u urology   can consider repeat sonogram to see if hydronephrosis is improving   If  patient with worsening  uremic symptoms patient would require HD. No plan for HD at present given improving renal function   Consent for hd obtained in chart, risk and benefit explained to patient. All questions answered.     B/l hydronephrosis   CT with b/l hydro s/p Gaston .  Pt non oliguric   Follow up Urology    Proteinuria  in setting of obstruction/ gaston  Repeat UA  Monitor at present

## 2020-10-15 NOTE — PROGRESS NOTE ADULT - SUBJECTIVE AND OBJECTIVE BOX
HPI:  75yr Male with  pmhx cad s/p stent 13 years ago, htn, BPH and nephrolithiasis 28 yrs ago s/p lithotripsy sent in by his PCP for abnormal labs.   Patient states that he is feels weak and tired, difficulty urinating over the past few weeks, associated with poor appetite and nausea. Denies dysuria, abdominal pain, fevers. No hx vomiting/ abdominal pain. Patient was on Flomax which was discontinued 3 months ago, started on Ditropan last week by his urologist. Patient went to see his pmd and was referred to ed for abnormal blood work- elevated Creatinine.     In the ed patient noted to have creatinine 9.98 in ED. US, CT abdomen shows  performed by ED showed significant b/l hydro and urinary retention with enlarged Prostate. . Garcia placed drained 500cc urine. Urology consulted- recommend conservative management.   In the ed patient noted to have A.fib with rvr- converted to sinus with fluids.   night and urinary incontinence. He was previously on flomax but stopped 3 months ago. Placed on finasteride by PMD, and more recently on ditropan by urologist Dr. Mclaughlin (07 Oct 2020 18:02)      Patient is a 75y old  Male who presents with a chief complaint of Patient was sent by his pmd for abnormal labs (08 Oct 2020 10:50)      SUBJECTIVE / OVERNIGHT EVENTS: Patient feels much improved.     T(C): 36.5 (10-15-20 @ 14:48), Max: 36.8 (10-15-20 @ 11:27)  HR: 73 (10-15-20 @ 14:48) (73 - 76)  BP: 156/81 (10-15-20 @ 14:48) (156/81 - 171/83)  RR: 17 (10-15-20 @ 14:48) (17 - 18)  SpO2: 95% (10-15-20 @ 14:48) (95% - 97%)    MEDICATIONS  (STANDING):  aspirin  chewable 81 milliGRAM(s) Oral daily  finasteride 5 milliGRAM(s) Oral daily  heparin   Injectable 5000 Unit(s) SubCutaneous every 8 hours  influenza   Vaccine 0.5 milliLiter(s) IntraMuscular once  labetalol 100 milliGRAM(s) Oral two times a day                        7.6    5.24  )-----------( 150      ( 15 Oct 2020 07:18 )             24.0               139|109|60<99  4.1|19|4.73  8.2,--,--  10-15 @ 07:14  141|109|61<106  4.1|21|4.72  7.9,--,--  10-14 @ 22:58  polyethylene glycol 3350 17 Gram(s) Oral daily  senna 2 Tablet(s) Oral at bedtime  sodium chloride 0.9%. 1000 milliLiter(s) (50 mL/Hr) IV Continuous <Continuous>  tamsulosin 0.4 milliGRAM(s) Oral at bedtime    MEDICATIONS  (PRN):    PHYSICAL EXAM:  GENERAL: NAD, well-developed  HEAD:  Atraumatic, Normocephalic  EYES: EOMI, conjunctiva and sclera clear  NECK: Supple, No JVD  CHEST/LUNG: Clear to auscultation bilaterally; No wheeze  HEART: Regular rate and rhythm; No murmurs, rubs, or gallops  ABDOMEN: Soft, Nontender, Nondistended; Bowel sounds present  EXTREMITIES:  2+ Peripheral Pulses, No clubbing, cyanosis, or edema  PSYCH: AAOx3  NEUROLOGY: non-focal  SKIN: No rashes or lesions                                                CAPILLARY BLOOD GLUCOSE          RADIOLOGY & ADDITIONAL TESTS:    Imaging Personally Reviewed:    Consultant(s) Notes Reviewed:  renal, cards     Care Discussed with Consultants/Other Providers:

## 2020-10-15 NOTE — PROGRESS NOTE ADULT - SUBJECTIVE AND OBJECTIVE BOX
Cardiovascular Disease Progress Note    Overnight events: No acute events overnight. Patient denies chest pain or SOB.     Otherwise review of systems negative    Objective Findings:  T(C): 36.6 (10-15-20 @ 04:26), Max: 36.8 (10-14-20 @ 21:12)  HR: 71 (10-15-20 @ 04:26) (65 - 78)  BP: 159/88 (10-15-20 @ 04:26) (114/72 - 159/88)  RR: 18 (10-15-20 @ 04:26) (17 - 18)  SpO2: 97% (10-15-20 @ 04:26) (94% - 97%)  Wt(kg): --  Daily     Daily Weight in k.1 (15 Oct 2020 04:26)      Physical Exam:  Gen: NAD; Patient resting comfortably  HEENT: EOMI, Normocephalic/ atraumatic  CV: RRR, normal S1 + S2, no m/r/g  Lungs:  Normal respiratory effort; clear to auscultation bilaterally  Abd: soft, non-tender; bowel sounds present  Ext: No edema; warm and well perfused    Telemetry: n/a    Laboratory Data:                        7.6    5.24  )-----------( 150      ( 15 Oct 2020 07:18 )             24.0     10-15    139  |  109<H>  |  60<H>  ----------------------------<  99  4.1   |  19<L>  |  4.73<H>    Ca    8.2<L>      15 Oct 2020 07:14  Mg     1.8     10-14                Inpatient Medications:  MEDICATIONS  (STANDING):  aspirin  chewable 81 milliGRAM(s) Oral daily  finasteride 5 milliGRAM(s) Oral daily  heparin   Injectable 5000 Unit(s) SubCutaneous every 8 hours  influenza   Vaccine 0.5 milliLiter(s) IntraMuscular once  labetalol 100 milliGRAM(s) Oral two times a day  polyethylene glycol 3350 17 Gram(s) Oral daily  senna 2 Tablet(s) Oral at bedtime  sodium chloride 0.9%. 1000 milliLiter(s) (50 mL/Hr) IV Continuous <Continuous>  tamsulosin 0.4 milliGRAM(s) Oral at bedtime      Assessment: 75 year old man with CAD s/p PCI several years ago, HTN, and BPH presents with obstructive VENUS and a-fib with RVR.     Plan of Care:    #A-fib with RVR-  Driven by obstructive uropathy and profound renal injury in the ED.  No further a-fib was noted after Mr. Raymundo was admitted.   Given the transient nature of his a-fib, I would hold off on anticoagulation.   Echo reviewed- TR with mild pHTN noted.     #Elevated troponins-  Secondary to reduced creatinine clearance.  No plan for coronary ischemic work up at this time given VENUS.    #CAD s/p PCI-  PCI several years ago.   Continue ASA.    #ACP (advance care planning)-  Advanced care planning was addressed. All questions were answered.        Over 25 minutes spent on total encounter; more than 50% of the visit was spent counseling and/or coordinating care by the attending physician.      Lionel Miller MD [facility]  Cardiovascular Disease  [phone]

## 2020-10-15 NOTE — DISCHARGE NOTE PROVIDER - CARE PROVIDER_API CALL
Ranulfo Le  INTERNAL MEDICINE  [address] [place] [address]  Phone: [phone]  Fax: [phone]  Follow Up Time:     Milton Lam  UROLOGY  [address], [address] [address]  Phone: [phone]  Fax: [phone]  Follow Up Time:    Ranulfo Le  INTERNAL MEDICINE  [address] [place] [address]  Phone: [phone]  Fax: [phone]  Follow Up Time:     Milton Lam  UROLOGY  [address], [address]  [place] [address]  Phone: [phone]  Fax: [phone]  Follow Up Time:     Jacob Aviles  Internal Medicine  [address] [place] [address]  Phone: [phone]  Fax: [phone]  Follow Up Time:

## 2020-10-15 NOTE — PROGRESS NOTE ADULT - SUBJECTIVE AND OBJECTIVE BOX
UROLOGY DAILY PROGRESS NOTE:     Subjective:    No events overnight. Feels fine.    Objective:    NAD, awake and alert  Respirations nonlabored  Abdomen soft, nontender, nondistended  Garcia urine clear    MEDICATIONS  (STANDING):  aspirin  chewable 81 milliGRAM(s) Oral daily  finasteride 5 milliGRAM(s) Oral daily  heparin   Injectable 5000 Unit(s) SubCutaneous every 8 hours  influenza   Vaccine 0.5 milliLiter(s) IntraMuscular once  labetalol 100 milliGRAM(s) Oral two times a day  polyethylene glycol 3350 17 Gram(s) Oral daily  senna 2 Tablet(s) Oral at bedtime  sodium chloride 0.9%. 1000 milliLiter(s) (50 mL/Hr) IV Continuous <Continuous>  tamsulosin 0.4 milliGRAM(s) Oral at bedtime    MEDICATIONS  (PRN):      Vital Signs Last 24 Hrs  T(C): 36.8 (15 Oct 2020 11:27), Max: 36.8 (14 Oct 2020 21:12)  T(F): 98.2 (15 Oct 2020 11:27), Max: 98.2 (14 Oct 2020 21:12)  HR: 76 (15 Oct 2020 12:24) (67 - 78)  BP: 171/83 (15 Oct 2020 12:24) (114/72 - 171/83)  BP(mean): --  RR: 18 (15 Oct 2020 12:24) (17 - 18)  SpO2: 97% (15 Oct 2020 12:24) (97% - 97%)    I&O's Detail    14 Oct 2020 07:01  -  15 Oct 2020 07:00  --------------------------------------------------------  IN:    Oral Fluid: 1440 mL    sodium chloride 0.9%: 600 mL  Total IN: 2040 mL    OUT:    Indwelling Catheter - Urethral (mL): 2600 mL  Total OUT: 2600 mL    Total NET: -560 mL      15 Oct 2020 07:01  -  15 Oct 2020 14:01  --------------------------------------------------------  IN:    Oral Fluid: 360 mL  Total IN: 360 mL    OUT:    Indwelling Catheter - Urethral (mL): 800 mL  Total OUT: 800 mL    Total NET: -440 mL          Daily Weight in k.1 (15 Oct 2020 04:26)    LABS:                        7.6    5.24  )-----------( 150      ( 15 Oct 2020 07:18 )             24.0     10-15    139  |  109<H>  |  60<H>  ----------------------------<  99  4.1   |  19<L>  |  4.73<H>    Ca    8.2<L>      15 Oct 2020 07:14  Mg     1.8     10-14

## 2020-10-15 NOTE — DISCHARGE NOTE PROVIDER - HOSPITAL COURSE
75 y.o. male history of CAD with a stent not currently on blood thinners sent in from his PCP for abnormal labs.  Pt has not been feeling well over the past week or so. c/o general weakness, poor appetite, n/v x1 week. denied fevers, or chills.  Does report decreased PO and decreased BM and urine output. US performed by ED showed significant b/l hydro and urinary retention. Gaston placed drained 500cc urine. seen by  s/p gaston.   nephrology, IR and Urology consulted. IR recommend ureteral stent placement. Urology recommend to monitor renal function and hold off cystoscopy with U. stent placement for now. Per Nephrology. Renal function now stablized at 4.6. If  patient with worsening  uremic symptoms patient would require HD. No plan for HD at present given improving renal function.  Pt to follow up with Urology and Nephrology outpatient.        75 y.o. male history of CAD with a stent not currently on blood thinners sent in from his PCP for abnormal labs.  Pt has not been feeling well over the past week or so. c/o general weakness, poor appetite, n/v x1 week. denied fevers, or chills.  Does report decreased PO and decreased BM and urine output. US performed by ED showed significant b/l hydro and urinary retention. Gaston placed drained 500cc urine. seen by  s/p gaston. nephrology, IR and Urology consulted. IR recommend ureteral stent placement. Urology recommend to monitor renal function and hold off cystoscopy with U. stent placement for now. Per Nephrology. Renal function now stablized at 4.6. If  patient with worsening  uremic symptoms patient would require HD. No plan for HD at present given improving renal function.  Pt to follow up with Urology and Nephrology outpatient.

## 2020-10-15 NOTE — DISCHARGE NOTE PROVIDER - NSDCMRMEDTOKEN_GEN_ALL_CORE_FT
finasteride 5 mg oral tablet: 1 tab(s) orally once a day  labetalol 100 mg oral tablet: 1 tab(s) orally 2 times a day  oxybutynin 5 mg oral tablet: 1 tab(s) orally once a day   aspirin 81 mg oral tablet, chewable: 1 tab(s) orally once a day  finasteride 5 mg oral tablet: 1 tab(s) orally once a day  labetalol 100 mg oral tablet: 1 tab(s) orally 2 times a day  tamsulosin 0.4 mg oral capsule: 1 cap(s) orally once a day (at bedtime)   aspirin 81 mg oral tablet, chewable: 1 tab(s) orally once a day  finasteride 5 mg oral tablet: 1 tab(s) orally once a day  labetalol 100 mg oral tablet: 1 tab(s) orally 2 times a day  Physical Therapy:   tamsulosin 0.4 mg oral capsule: 1 cap(s) orally once a day (at bedtime)

## 2020-10-15 NOTE — DISCHARGE NOTE PROVIDER - NSDCCPCAREPLAN_GEN_ALL_CORE_FT
PRINCIPAL DISCHARGE DIAGNOSIS  Diagnosis: Acute kidney injury superimposed on CKD  Assessment and Plan of Treatment: Secondary to Bilateral moderate to severe hydroureteronephrosis  Follow up with Nephrologist in 1 week-GABO SINGH MD  Follow up with Urology in 1 week      SECONDARY DISCHARGE DIAGNOSES  Diagnosis: Rapid atrial fibrillation  Assessment and Plan of Treatment: stable  Follow up with your doctor    Diagnosis: Essential hypertension  Assessment and Plan of Treatment: Low salt diet  Activity as tolerated.  Take all medication as prescribed.  Follow up with your medical doctor for routine blood pressure monitoring at your next visit.  Notify your doctor if you have any of the following symptoms:   Dizziness, Lightheadedness, Blurry vision, Headache, Chest pain, Shortness of breath

## 2020-10-15 NOTE — DISCHARGE NOTE PROVIDER - PROVIDER TOKENS
PROVIDER:[TOKEN:[[id number]]],PROVIDER:[TOKEN:[[id number]]] PROVIDER:[TOKEN:[[id number]]],PROVIDER:[TOKEN:[[id number]]],PROVIDER:[TOKEN:[[id number]]]

## 2020-10-15 NOTE — PROGRESS NOTE ADULT - SUBJECTIVE AND OBJECTIVE BOX
[facility]   MD GABO CAMEJO MD RUORU WONG, PA    TEL:  OFFICE: [phone]  DR KIM CELL: [phone]  JEROME DSOUZA CELL: [phone]  DR. SINGH CELL: [phone]  DR. JACOB CELL: [phone]    FROM 5 PM - 7 AM PLEASE CALL ANSWERING SERVICE: [phone]    RENAL FOLLOW UP NOTE--Date of Service 10-15-20 @ 09:54  --------------------------------------------------------------------------------  HPI:      Pt seen and examined at bedside.   Denies SOB, chest pain     PAST HISTORY  --------------------------------------------------------------------------------  No significant changes to PMH, PSH, FHx, SHx, unless otherwise noted    ALLERGIES & MEDICATIONS  --------------------------------------------------------------------------------  Allergies    penicillin (Rash)    Intolerances      Standing Inpatient Medications  aspirin  chewable 81 milliGRAM(s) Oral daily  finasteride 5 milliGRAM(s) Oral daily  heparin   Injectable 5000 Unit(s) SubCutaneous every 8 hours  influenza   Vaccine 0.5 milliLiter(s) IntraMuscular once  labetalol 100 milliGRAM(s) Oral two times a day  polyethylene glycol 3350 17 Gram(s) Oral daily  senna 2 Tablet(s) Oral at bedtime  sodium chloride 0.9%. 1000 milliLiter(s) IV Continuous <Continuous>  tamsulosin 0.4 milliGRAM(s) Oral at bedtime    PRN Inpatient Medications      REVIEW OF SYSTEMS  --------------------------------------------------------------------------------  General: no fever  CVS: no chest pain  RESP: no sob, no cough  MSK: no edema     VITALS/PHYSICAL EXAM  --------------------------------------------------------------------------------  T(C): 36.6 (10-15-20 @ 04:26), Max: 36.8 (10-14-20 @ 21:12)  HR: 71 (10-15-20 @ 04:26) (65 - 78)  BP: 159/88 (10-15-20 @ 04:26) (114/72 - 159/88)  RR: 18 (10-15-20 @ 04:26) (17 - 18)  SpO2: 97% (10-15-20 @ 04:26) (94% - 97%)  Wt(kg): --        10-14-20 @ 07:01  -  10-15-20 @ 07:00  --------------------------------------------------------  IN: 2040 mL / OUT: 2600 mL / NET: -560 mL      Physical Exam:  	Gen: NAD  	HEENT: MMM  	Pulm: CTA B/L  	CV: S1S2  	Abd: Soft, +BS  	Ext: No LE edema B/L                      Neuro: Awake   	Skin: Warm and Dry   	Vascular access: no hd catheter          [facility]  LABS/STUDIES  --------------------------------------------------------------------------------              7.6    5.24  >-----------<  150      [10-15-20 @ 07:18]              24.0     139  |  109  |  60  ----------------------------<  99      [10-15-20 @ 07:14]  4.1   |  19  |  4.73        Ca     8.2     [10-15-20 @ 07:14]      Mg     1.8     [10-14-20 @ 07:04]            Creatinine Trend:  SCr 4.73 [10-15 @ 07:14]  SCr 4.72 [10-14 @ 22:58]  SCr 4.63 [10-14 @ 07:04]  SCr 4.78 [10-13 @ 22:51]  SCr 5.11 [10-13 @ 07:05]    Urinalysis - [10-11-20 @ 18:37]      Color Light Yellow / Appearance Slightly Turbid / SG 1.011 / pH 8.0      Gluc Trace / Ketone Negative  / Bili Negative / Urobili Negative       Blood Small / Protein 30 mg/dL / Leuk Est Large / Nitrite Negative      RBC 23 / WBC 82 / Hyaline 5 / Gran  / Sq Epi  / Non Sq Epi 0 / Bacteria Few      Iron 38, TIBC 205, %sat 19      [10-11-20 @ 09:26]  Ferritin 601      [10-11-20 @ 09:26]  TSH 1.01      [10-08-20 @ 02:55]    HCV 0.13, Nonreact      [10-08-20 @ 10:59]

## 2020-10-16 ENCOUNTER — TRANSCRIPTION ENCOUNTER (OUTPATIENT)
Age: 75
End: 2020-10-16

## 2020-10-16 LAB
ANION GAP SERPL CALC-SCNC: 11 MMOL/L — SIGNIFICANT CHANGE UP (ref 5–17)
ANION GAP SERPL CALC-SCNC: 12 MMOL/L — SIGNIFICANT CHANGE UP (ref 5–17)
ANION GAP SERPL CALC-SCNC: 14 MMOL/L — SIGNIFICANT CHANGE UP (ref 5–17)
BUN SERPL-MCNC: 64 MG/DL — HIGH (ref 7–23)
BUN SERPL-MCNC: 64 MG/DL — HIGH (ref 7–23)
BUN SERPL-MCNC: 65 MG/DL — HIGH (ref 7–23)
CALCIUM SERPL-MCNC: 8.4 MG/DL — SIGNIFICANT CHANGE UP (ref 8.4–10.5)
CALCIUM SERPL-MCNC: 8.5 MG/DL — SIGNIFICANT CHANGE UP (ref 8.4–10.5)
CALCIUM SERPL-MCNC: 8.6 MG/DL — SIGNIFICANT CHANGE UP (ref 8.4–10.5)
CHLORIDE SERPL-SCNC: 107 MMOL/L — SIGNIFICANT CHANGE UP (ref 96–108)
CHLORIDE SERPL-SCNC: 108 MMOL/L — SIGNIFICANT CHANGE UP (ref 96–108)
CHLORIDE SERPL-SCNC: 110 MMOL/L — HIGH (ref 96–108)
CO2 SERPL-SCNC: 18 MMOL/L — LOW (ref 22–31)
CO2 SERPL-SCNC: 19 MMOL/L — LOW (ref 22–31)
CO2 SERPL-SCNC: 19 MMOL/L — LOW (ref 22–31)
CREAT SERPL-MCNC: 4.35 MG/DL — HIGH (ref 0.5–1.3)
CREAT SERPL-MCNC: 4.48 MG/DL — HIGH (ref 0.5–1.3)
CREAT SERPL-MCNC: 4.6 MG/DL — HIGH (ref 0.5–1.3)
GLUCOSE SERPL-MCNC: 133 MG/DL — HIGH (ref 70–99)
GLUCOSE SERPL-MCNC: 93 MG/DL — SIGNIFICANT CHANGE UP (ref 70–99)
GLUCOSE SERPL-MCNC: 93 MG/DL — SIGNIFICANT CHANGE UP (ref 70–99)
HCT VFR BLD CALC: 24.2 % — LOW (ref 39–50)
HGB BLD-MCNC: 7.8 G/DL — LOW (ref 13–17)
MAGNESIUM SERPL-MCNC: 1.7 MG/DL — SIGNIFICANT CHANGE UP (ref 1.6–2.6)
MCHC RBC-ENTMCNC: 29.3 PG — SIGNIFICANT CHANGE UP (ref 27–34)
MCHC RBC-ENTMCNC: 32.2 GM/DL — SIGNIFICANT CHANGE UP (ref 32–36)
MCV RBC AUTO: 91 FL — SIGNIFICANT CHANGE UP (ref 80–100)
NRBC # BLD: 0 /100 WBCS — SIGNIFICANT CHANGE UP (ref 0–0)
PLATELET # BLD AUTO: 162 K/UL — SIGNIFICANT CHANGE UP (ref 150–400)
POTASSIUM SERPL-MCNC: 4.4 MMOL/L — SIGNIFICANT CHANGE UP (ref 3.5–5.3)
POTASSIUM SERPL-MCNC: 4.5 MMOL/L — SIGNIFICANT CHANGE UP (ref 3.5–5.3)
POTASSIUM SERPL-MCNC: 4.9 MMOL/L — SIGNIFICANT CHANGE UP (ref 3.5–5.3)
POTASSIUM SERPL-SCNC: 4.4 MMOL/L — SIGNIFICANT CHANGE UP (ref 3.5–5.3)
POTASSIUM SERPL-SCNC: 4.5 MMOL/L — SIGNIFICANT CHANGE UP (ref 3.5–5.3)
POTASSIUM SERPL-SCNC: 4.9 MMOL/L — SIGNIFICANT CHANGE UP (ref 3.5–5.3)
RBC # BLD: 2.66 M/UL — LOW (ref 4.2–5.8)
RBC # FLD: 12 % — SIGNIFICANT CHANGE UP (ref 10.3–14.5)
SODIUM SERPL-SCNC: 138 MMOL/L — SIGNIFICANT CHANGE UP (ref 135–145)
SODIUM SERPL-SCNC: 140 MMOL/L — SIGNIFICANT CHANGE UP (ref 135–145)
SODIUM SERPL-SCNC: 140 MMOL/L — SIGNIFICANT CHANGE UP (ref 135–145)
WBC # BLD: 5.88 K/UL — SIGNIFICANT CHANGE UP (ref 3.8–10.5)
WBC # FLD AUTO: 5.88 K/UL — SIGNIFICANT CHANGE UP (ref 3.8–10.5)

## 2020-10-16 RX ORDER — OXYBUTYNIN CHLORIDE 5 MG
1 TABLET ORAL
Qty: 0 | Refills: 0 | DISCHARGE

## 2020-10-16 RX ORDER — FINASTERIDE 5 MG/1
1 TABLET, FILM COATED ORAL
Qty: 0 | Refills: 0 | DISCHARGE

## 2020-10-16 RX ORDER — ASPIRIN/CALCIUM CARB/MAGNESIUM 324 MG
1 TABLET ORAL
Qty: 0 | Refills: 0 | DISCHARGE
Start: 2020-10-16

## 2020-10-16 RX ORDER — FINASTERIDE 5 MG/1
1 TABLET, FILM COATED ORAL
Qty: 0 | Refills: 0 | DISCHARGE
Start: 2020-10-16

## 2020-10-16 RX ORDER — TAMSULOSIN HYDROCHLORIDE 0.4 MG/1
1 CAPSULE ORAL
Qty: 30 | Refills: 0
Start: 2020-10-16 | End: 2020-11-14

## 2020-10-16 RX ADMIN — HEPARIN SODIUM 5000 UNIT(S): 5000 INJECTION INTRAVENOUS; SUBCUTANEOUS at 13:08

## 2020-10-16 RX ADMIN — POLYETHYLENE GLYCOL 3350 17 GRAM(S): 17 POWDER, FOR SOLUTION ORAL at 17:24

## 2020-10-16 RX ADMIN — HEPARIN SODIUM 5000 UNIT(S): 5000 INJECTION INTRAVENOUS; SUBCUTANEOUS at 05:17

## 2020-10-16 RX ADMIN — FINASTERIDE 5 MILLIGRAM(S): 5 TABLET, FILM COATED ORAL at 22:46

## 2020-10-16 RX ADMIN — Medication 81 MILLIGRAM(S): at 11:20

## 2020-10-16 RX ADMIN — HEPARIN SODIUM 5000 UNIT(S): 5000 INJECTION INTRAVENOUS; SUBCUTANEOUS at 22:46

## 2020-10-16 RX ADMIN — SENNA PLUS 2 TABLET(S): 8.6 TABLET ORAL at 22:46

## 2020-10-16 RX ADMIN — Medication 100 MILLIGRAM(S): at 17:24

## 2020-10-16 RX ADMIN — Medication 100 MILLIGRAM(S): at 05:17

## 2020-10-16 RX ADMIN — TAMSULOSIN HYDROCHLORIDE 0.4 MILLIGRAM(S): 0.4 CAPSULE ORAL at 22:46

## 2020-10-16 NOTE — PROGRESS NOTE ADULT - SUBJECTIVE AND OBJECTIVE BOX
Cardiovascular Disease Progress Note    Overnight events: No acute events overnight. Patient denies chest pain or SOB.   Otherwise review of systems negative    Objective Findings:  T(C): 37 (10-16-20 @ 04:14), Max: 37 (10-16-20 @ 04:14)  HR: 70 (10-16-20 @ 04:14) (70 - 76)  BP: 148/87 (10-16-20 @ 04:14) (148/87 - 171/83)  RR: 18 (10-16-20 @ 04:14) (17 - 18)  SpO2: 96% (10-16-20 @ 04:14) (95% - 97%)  Wt(kg): --  Daily     Daily       Physical Exam:  Gen: NAD; Patient resting comfortably  HEENT: EOMI, Normocephalic/ atraumatic  CV: RRR, normal S1 + S2, no m/r/g  Lungs:  Normal respiratory effort; clear to auscultation bilaterally  Abd: soft, non-tender; bowel sounds present  Ext: No edema; warm and well perfused    Telemetry: n/a    Laboratory Data:                        7.8    5.88  )-----------( 162      ( 16 Oct 2020 07:15 )             24.2     10-16    138  |  108  |  64<H>  ----------------------------<  93  4.4   |  18<L>  |  4.60<H>    Ca    8.6      16 Oct 2020 07:15  Mg     1.7     10-16                Inpatient Medications:  MEDICATIONS  (STANDING):  aspirin  chewable 81 milliGRAM(s) Oral daily  finasteride 5 milliGRAM(s) Oral daily  heparin   Injectable 5000 Unit(s) SubCutaneous every 8 hours  influenza   Vaccine 0.5 milliLiter(s) IntraMuscular once  labetalol 100 milliGRAM(s) Oral two times a day  polyethylene glycol 3350 17 Gram(s) Oral daily  senna 2 Tablet(s) Oral at bedtime  sodium chloride 0.9%. 1000 milliLiter(s) (50 mL/Hr) IV Continuous <Continuous>  tamsulosin 0.4 milliGRAM(s) Oral at bedtime      Assessment: 75 year old man with CAD s/p PCI several years ago, HTN, and BPH presents with obstructive VENUS and a-fib with RVR.     Plan of Care:    #A-fib with RVR-  Driven by obstructive uropathy and profound renal injury in the ED.  No further a-fib was noted after Mr. Raymundo was admitted.   Given the transient nature of his a-fib, I would hold off on anticoagulation.   Echo reviewed- TR with mild pHTN noted.     #Elevated troponins-  Secondary to reduced creatinine clearance.  No plan for coronary ischemic work up at this time given VENUS.    #CAD s/p PCI-  PCI several years ago.   Continue ASA.      Over 25 minutes spent on total encounter; more than 50% of the visit was spent counseling and/or coordinating care by the attending physician.      Lionel Miller MD [facility]  Cardiovascular Disease  [phone]

## 2020-10-16 NOTE — PROGRESS NOTE ADULT - SUBJECTIVE AND OBJECTIVE BOX
[facility] [place] [facility]   MD GABO CAMEJO MD RUORU WONG, PA    TEL:  OFFICE: [phone]  DR KIM CELL: [phone]  JEROME DSOUZA CELL: [phone]  DR. SINGH CELL: [phone]  DR. JACOB CELL: [phone]    FROM 5 PM - 7 AM PLEASE CALL ANSWERING SERVICE: [phone]    RENAL FOLLOW UP NOTE--Date of Service 10-16-20 @ 09:24  --------------------------------------------------------------------------------  HPI:      Pt seen and examined at bedside.   Denies SOB, chest pain     PAST HISTORY  --------------------------------------------------------------------------------  No significant changes to PMH, PSH, FHx, SHx, unless otherwise noted    ALLERGIES & MEDICATIONS  --------------------------------------------------------------------------------  Allergies    penicillin (Rash)    Intolerances      Standing Inpatient Medications  aspirin  chewable 81 milliGRAM(s) Oral daily  finasteride 5 milliGRAM(s) Oral daily  heparin   Injectable 5000 Unit(s) SubCutaneous every 8 hours  influenza   Vaccine 0.5 milliLiter(s) IntraMuscular once  labetalol 100 milliGRAM(s) Oral two times a day  polyethylene glycol 3350 17 Gram(s) Oral daily  senna 2 Tablet(s) Oral at bedtime  sodium chloride 0.9%. 1000 milliLiter(s) IV Continuous <Continuous>  tamsulosin 0.4 milliGRAM(s) Oral at bedtime    PRN Inpatient Medications      REVIEW OF SYSTEMS  --------------------------------------------------------------------------------  General: no fever  CVS: no chest pain  RESP: no sob, no cough  ABD: no abdominal pain    MSK: no edema     VITALS/PHYSICAL EXAM  --------------------------------------------------------------------------------  T(C): 37 (10-16-20 @ 04:14), Max: 37 (10-16-20 @ 04:14)  HR: 70 (10-16-20 @ 04:14) (70 - 76)  BP: 148/87 (10-16-20 @ 04:14) (148/87 - 171/83)  RR: 18 (10-16-20 @ 04:14) (17 - 18)  SpO2: 96% (10-16-20 @ 04:14) (95% - 97%)  Wt(kg): --        10-15-20 @ 07:01  -  10-16-20 @ 07:00  --------------------------------------------------------  IN: 2160 mL / OUT: 3400 mL / NET: -1240 mL      Physical Exam:  	Gen: NAD  	HEENT: MMM  	Pulm: CTA B/L  	CV: S1S2  	Abd: Soft, +BS  	Ext: No LE edema B/L                      Neuro: Awake   	Skin: Warm and Dry   	Vascular access: no hd catheter           gaston  LABS/STUDIES  --------------------------------------------------------------------------------              7.8    5.88  >-----------<  162      [10-16-20 @ 07:15]              24.2     138  |  108  |  64  ----------------------------<  93      [10-16-20 @ 07:15]  4.4   |  18  |  4.60        Ca     8.6     [10-16-20 @ 07:15]      Mg     1.7     [10-16-20 @ 07:15]            Creatinine Trend:  SCr 4.60 [10-16 @ 07:15]  SCr 4.64 [10-15 @ 19:30]  SCr 4.73 [10-15 @ 07:14]  SCr 4.72 [10-14 @ 22:58]  SCr 4.63 [10-14 @ 07:04]    Urinalysis - [10-11-20 @ 18:37]      Color Light Yellow / Appearance Slightly Turbid / SG 1.011 / pH 8.0      Gluc Trace / Ketone Negative  / Bili Negative / Urobili Negative       Blood Small / Protein 30 mg/dL / Leuk Est Large / Nitrite Negative      RBC 23 / WBC 82 / Hyaline 5 / Gran  / Sq Epi  / Non Sq Epi 0 / Bacteria Few      Iron 38, TIBC 205, %sat 19      [10-11-20 @ 09:26]  Ferritin 601      [10-11-20 @ 09:26]  TSH 1.01      [10-08-20 @ 02:55]    HCV 0.13, Nonreact      [10-08-20 @ 10:59]

## 2020-10-16 NOTE — DISCHARGE NOTE NURSING/CASE MANAGEMENT/SOCIAL WORK - NSSCTYPOFSERV_GEN_ALL_CORE
Start of Care 10/17 Services requested: RN, Physical Therapy, [employer] Aide & Social Work. A nurse will call to set up an appointment. Start of Care 10/18 Services requested: RN, Physical Therapy, Bath Aide & Social Work. A nurse will call to set up an appointment. Start of Care 10/21 Services requested: RN, Physical Therapy, Bath Aide & Social Work. A nurse will call to set up an appointment.

## 2020-10-16 NOTE — PROGRESS NOTE ADULT - ASSESSMENT
75 y.o. male history of CAD with a stent not currently on blood thinners sent in from his PCP for abnormal labs.  Pt has not been feeling well over the past week or so. c/o general weakness, poor appetite, n/v x1 week. denied fevers, or chills.  Does report decreased PO and decreased BM and urine output. US performed by ED showed significant b/l hydro and urinary retention. Gaston placed drained 500cc urine. seen by  s/p gaston  nephrology consulted for VENUS    VENUS  likely sec to obstructive uropathy  CT with b/l hydro s/p Gaston .  Pt non oliguric   Follow up Urology  Renal function now stablized at 4.6  sonogram 10/9 with persistent severe hydronephrosis.   Follow up URology and IR  Per Urology Persistent hydro likely structural in the setting of chronic obstruction  Pt will need further work up for bladder obstruction. f/u urology   can consider repeat sonogram to see if hydronephrosis is improving   If  patient with worsening  uremic symptoms patient would require HD. No plan for HD at present given improving renal function   Consent for hd obtained in chart, risk and benefit explained to patient. All questions answered.     B/l hydronephrosis   CT with b/l hydro s/p Gaston .  Pt non oliguric   Follow up Urology    Proteinuria  in setting of obstruction/ gaston  Repeat UA  Monitor at present

## 2020-10-16 NOTE — PROGRESS NOTE ADULT - SUBJECTIVE AND OBJECTIVE BOX
HPI:  75yr Male with  pmhx cad s/p stent 13 years ago, htn, BPH and nephrolithiasis 28 yrs ago s/p lithotripsy sent in by his PCP for abnormal labs.   Patient states that he is feels weak and tired, difficulty urinating over the past few weeks, associated with poor appetite and nausea. Denies dysuria, abdominal pain, fevers. No hx vomiting/ abdominal pain. Patient was on Flomax which was discontinued 3 months ago, started on Ditropan last week by his urologist. Patient went to see his pmd and was referred to ed for abnormal blood work- elevated Creatinine.     In the ed patient noted to have creatinine 9.98 in ED. US, CT abdomen shows  performed by ED showed significant b/l hydro and urinary retention with enlarged Prostate. . Garcia placed drained 500cc urine. Urology consulted- recommend conservative management.   In the ed patient noted to have A.fib with rvr- converted to sinus with fluids.   night and urinary incontinence. He was previously on flomax but stopped 3 months ago. Placed on finasteride by PMD, and more recently on ditropan by urologist Dr. Mclaughlin (07 Oct 2020 18:02)      Patient is a 75y old  Male who presents with a chief complaint of Patient was sent by his pmd for abnormal labs (08 Oct 2020 10:50)      SUBJECTIVE / OVERNIGHT EVENTS: Patient feels much improved.     T(C): 36.4 (10-16-20 @ 14:53), Max: 36.4 (10-16-20 @ 14:53)  HR: --  BP: --  RR: 18 (10-16-20 @ 14:53) (18 - 18)  SpO2: 96% (10-16-20 @ 14:53) (96% - 96%)      MEDICATIONS  (STANDING):  aspirin  chewable 81 milliGRAM(s) Oral daily  finasteride 5 milliGRAM(s) Oral daily  heparin   Injectable 5000 Unit(s) SubCutaneous every 8 hours  influenza   Vaccine 0.5 milliLiter(s) IntraMuscular once  labetalol 100 milliGRAM(s) Oral two times a day  polyethylene glycol 3350 17 Gram(s) Oral daily  senna 2 Tablet(s) Oral at bedtime  sodium chloride 0.9%. 1000 milliLiter(s) (50 mL/Hr) IV Continuous <Continuous>  tamsulosin 0.4 milliGRAM(s) Oral at bedtime    MEDICATIONS  (PRN):    PHYSICAL EXAM:  GENERAL: NAD, well-developed  HEAD:  Atraumatic, Normocephalic  EYES: EOMI, conjunctiva and sclera clear  NECK: Supple, No JVD  CHEST/LUNG: Clear to auscultation bilaterally; No wheeze  HEART: Regular rate and rhythm; No murmurs, rubs, or gallops  ABDOMEN: Soft, Nontender, Nondistended; Bowel sounds present  EXTREMITIES:  2+ Peripheral Pulses, No clubbing, cyanosis, or edema  PSYCH: AAOx3  NEUROLOGY: non-focal  SKIN: No rashes or lesions                                   7.8    5.88  )-----------( 162      ( 16 Oct 2020 07:15 )             24.2               138|108|64<93  4.4|18|4.60  8.6,1.7,--  10-16 @ 07:15  140|108|64<114  4.6|20|4.64  8.2,--,--  10-15 @ 19:30                                     CAPILLARY BLOOD GLUCOSE          RADIOLOGY & ADDITIONAL TESTS:    Imaging Personally Reviewed:    Consultant(s) Notes Reviewed:  renal, cards     Care Discussed with Consultants/Other Providers:

## 2020-10-16 NOTE — DISCHARGE NOTE NURSING/CASE MANAGEMENT/SOCIAL WORK - PATIENT PORTAL LINK FT
You can access the FollowMyHealth Patient Portal offered by [facility] by registering at the following website: [email or web address]. By joining [employer]’s FollowMyHealth portal, you will also be able to view your health information using other applications (apps) compatible with our system.

## 2020-10-17 DIAGNOSIS — I95.1 ORTHOSTATIC HYPOTENSION: ICD-10-CM

## 2020-10-17 LAB
ANION GAP SERPL CALC-SCNC: 12 MMOL/L — SIGNIFICANT CHANGE UP (ref 5–17)
ANION GAP SERPL CALC-SCNC: 12 MMOL/L — SIGNIFICANT CHANGE UP (ref 5–17)
BUN SERPL-MCNC: 60 MG/DL — HIGH (ref 7–23)
BUN SERPL-MCNC: 63 MG/DL — HIGH (ref 7–23)
CALCIUM SERPL-MCNC: 8.5 MG/DL — SIGNIFICANT CHANGE UP (ref 8.4–10.5)
CALCIUM SERPL-MCNC: 8.5 MG/DL — SIGNIFICANT CHANGE UP (ref 8.4–10.5)
CHLORIDE SERPL-SCNC: 109 MMOL/L — HIGH (ref 96–108)
CHLORIDE SERPL-SCNC: 111 MMOL/L — HIGH (ref 96–108)
CO2 SERPL-SCNC: 17 MMOL/L — LOW (ref 22–31)
CO2 SERPL-SCNC: 18 MMOL/L — LOW (ref 22–31)
CREAT SERPL-MCNC: 4.18 MG/DL — HIGH (ref 0.5–1.3)
CREAT SERPL-MCNC: 4.49 MG/DL — HIGH (ref 0.5–1.3)
GLUCOSE SERPL-MCNC: 106 MG/DL — HIGH (ref 70–99)
GLUCOSE SERPL-MCNC: 92 MG/DL — SIGNIFICANT CHANGE UP (ref 70–99)
POTASSIUM SERPL-MCNC: 4.4 MMOL/L — SIGNIFICANT CHANGE UP (ref 3.5–5.3)
POTASSIUM SERPL-MCNC: 4.6 MMOL/L — SIGNIFICANT CHANGE UP (ref 3.5–5.3)
POTASSIUM SERPL-SCNC: 4.4 MMOL/L — SIGNIFICANT CHANGE UP (ref 3.5–5.3)
POTASSIUM SERPL-SCNC: 4.6 MMOL/L — SIGNIFICANT CHANGE UP (ref 3.5–5.3)
SODIUM SERPL-SCNC: 138 MMOL/L — SIGNIFICANT CHANGE UP (ref 135–145)
SODIUM SERPL-SCNC: 141 MMOL/L — SIGNIFICANT CHANGE UP (ref 135–145)

## 2020-10-17 RX ORDER — SODIUM CHLORIDE 9 MG/ML
250 INJECTION INTRAMUSCULAR; INTRAVENOUS; SUBCUTANEOUS ONCE
Refills: 0 | Status: COMPLETED | OUTPATIENT
Start: 2020-10-17 | End: 2020-10-17

## 2020-10-17 RX ADMIN — SODIUM CHLORIDE 50 MILLILITER(S): 9 INJECTION INTRAMUSCULAR; INTRAVENOUS; SUBCUTANEOUS at 06:15

## 2020-10-17 RX ADMIN — Medication 100 MILLIGRAM(S): at 06:15

## 2020-10-17 RX ADMIN — SODIUM CHLORIDE 250 MILLILITER(S): 9 INJECTION INTRAMUSCULAR; INTRAVENOUS; SUBCUTANEOUS at 12:15

## 2020-10-17 RX ADMIN — SENNA PLUS 2 TABLET(S): 8.6 TABLET ORAL at 21:55

## 2020-10-17 RX ADMIN — HEPARIN SODIUM 5000 UNIT(S): 5000 INJECTION INTRAVENOUS; SUBCUTANEOUS at 06:15

## 2020-10-17 RX ADMIN — HEPARIN SODIUM 5000 UNIT(S): 5000 INJECTION INTRAVENOUS; SUBCUTANEOUS at 21:55

## 2020-10-17 RX ADMIN — SODIUM CHLORIDE 50 MILLILITER(S): 9 INJECTION INTRAMUSCULAR; INTRAVENOUS; SUBCUTANEOUS at 12:14

## 2020-10-17 RX ADMIN — HEPARIN SODIUM 5000 UNIT(S): 5000 INJECTION INTRAVENOUS; SUBCUTANEOUS at 13:30

## 2020-10-17 RX ADMIN — POLYETHYLENE GLYCOL 3350 17 GRAM(S): 17 POWDER, FOR SOLUTION ORAL at 17:10

## 2020-10-17 RX ADMIN — Medication 81 MILLIGRAM(S): at 11:46

## 2020-10-17 RX ADMIN — SODIUM CHLORIDE 50 MILLILITER(S): 9 INJECTION INTRAMUSCULAR; INTRAVENOUS; SUBCUTANEOUS at 20:40

## 2020-10-17 RX ADMIN — TAMSULOSIN HYDROCHLORIDE 0.4 MILLIGRAM(S): 0.4 CAPSULE ORAL at 21:55

## 2020-10-17 RX ADMIN — FINASTERIDE 5 MILLIGRAM(S): 5 TABLET, FILM COATED ORAL at 21:55

## 2020-10-17 NOTE — PROGRESS NOTE ADULT - SUBJECTIVE AND OBJECTIVE BOX
HPI:  75yr Male with  pmhx cad s/p stent 13 years ago, htn, BPH and nephrolithiasis 28 yrs ago s/p lithotripsy sent in by his PCP for abnormal labs.   Patient states that he is feels weak and tired, difficulty urinating over the past few weeks, associated with poor appetite and nausea. Denies dysuria, abdominal pain, fevers. No hx vomiting/ abdominal pain. Patient was on Flomax which was discontinued 3 months ago, started on Ditropan last week by his urologist. Patient went to see his pmd and was referred to ed for abnormal blood work- elevated Creatinine.     In the ed patient noted to have creatinine 9.98 in ED. US, CT abdomen shows  performed by ED showed significant b/l hydro and urinary retention with enlarged Prostate. . Garcia placed drained 500cc urine. Urology consulted- recommend conservative management.   In the ed patient noted to have A.fib with rvr- converted to sinus with fluids.   night and urinary incontinence. He was previously on flomax but stopped 3 months ago. Placed on finasteride by PMD, and more recently on ditropan by urologist Dr. Mclaughlin (07 Oct 2020 18:02)      Patient is a 75y old  Male who presents with a chief complaint of Patient was sent by his pmd for abnormal labs (08 Oct 2020 10:50)      SUBJECTIVE / OVERNIGHT EVENTS: Patient reports nausea and feels tired.   No events overnight.     T(C): 36.4 (10-16-20 @ 14:53), Max: 36.4 (10-16-20 @ 14:53)  HR: --70  BP: --126/77  RR: 18 (10-16-20 @ 14:53) (18 - 18)  SpO2: 96% (10-16-20 @ 14:53) (96% - 96%)      MEDICATIONS  (STANDING):  aspirin  chewable 81 milliGRAM(s) Oral daily  finasteride 5 milliGRAM(s) Oral daily  heparin   Injectable 5000 Unit(s) SubCutaneous every 8 hours  influenza   Vaccine 0.5 milliLiter(s) IntraMuscular once  labetalol 100 milliGRAM(s) Oral two times a day  polyethylene glycol 3350 17 Gram(s) Oral daily  senna 2 Tablet(s) Oral at bedtime  sodium chloride 0.9%. 1000 milliLiter(s) (50 mL/Hr) IV Continuous <Continuous>  tamsulosin 0.4 milliGRAM(s) Oral at bedtime    MEDICATIONS  (PRN):    PHYSICAL EXAM:  GENERAL: NAD, well-developed  HEAD:  Atraumatic, Normocephalic  EYES: EOMI, conjunctiva and sclera clear  NECK: Supple, No JVD  CHEST/LUNG: Clear to auscultation bilaterally; No wheeze  HEART: Regular rate and rhythm; No murmurs, rubs, or gallops  ABDOMEN: Soft, Nontender, Nondistended; Bowel sounds present  EXTREMITIES:  2+ Peripheral Pulses, No clubbing, cyanosis, or edema  PSYCH: AAOx3  NEUROLOGY: non-focal  SKIN: No rashes or lesions                                   7.8    5.88  )-----------( 162      ( 16 Oct 2020 07:15 )             24.2               138|109|63<92  4.4|17|4.49  8.5,--,--  10-17 @ 07:07  140|110|65<93  4.9|19|4.35  8.4,--,--  10-16 @ 22:38  140|107|64<133  4.5|19|4.48  8.5,--,--  10-16 @ 17:21                                       CAPILLARY BLOOD GLUCOSE          RADIOLOGY & ADDITIONAL TESTS:    Imaging Personally Reviewed:    Consultant(s) Notes Reviewed:  renal, cards     Care Discussed with Consultants/Other Providers:

## 2020-10-17 NOTE — PROGRESS NOTE ADULT - SUBJECTIVE AND OBJECTIVE BOX
[facility]   MD GABO CAMEJO MD RUORU WONG, PA    TEL:  OFFICE: [phone]  DR KIM CELL: [phone]  JEROME DSOUZA CELL: [phone]  DR. SINGH CELL: [phone]  DR. JACOB CELL: [phone]    FROM 5 PM - 7 AM PLEASE CALL ANSWERING SERVICE: [phone]    RENAL FOLLOW UP NOTE--Date of Service 10-17-20 @ 09:19  --------------------------------------------------------------------------------  HPI:      Pt seen and examined at bedside.   Denies SOB, chest pain     PAST HISTORY  --------------------------------------------------------------------------------  No significant changes to PMH, PSH, FHx, SHx, unless otherwise noted    ALLERGIES & MEDICATIONS  --------------------------------------------------------------------------------  Allergies    penicillin (Rash)    Intolerances      Standing Inpatient Medications  aspirin  chewable 81 milliGRAM(s) Oral daily  finasteride 5 milliGRAM(s) Oral daily  heparin   Injectable 5000 Unit(s) SubCutaneous every 8 hours  influenza   Vaccine 0.5 milliLiter(s) IntraMuscular once  labetalol 100 milliGRAM(s) Oral two times a day  polyethylene glycol 3350 17 Gram(s) Oral daily  senna 2 Tablet(s) Oral at bedtime  sodium chloride 0.9%. 1000 milliLiter(s) IV Continuous <Continuous>  tamsulosin 0.4 milliGRAM(s) Oral at bedtime    PRN Inpatient Medications      REVIEW OF SYSTEMS  --------------------------------------------------------------------------------  General: no fever  CVS: no chest pain  RESP: no sob, no cough  ABD: no abdominal pain  MSK: no edema     VITALS/PHYSICAL EXAM  --------------------------------------------------------------------------------  T(C): 36.7 (10-17-20 @ 06:10), Max: 37.1 (10-16-20 @ 20:01)  HR: 79 (10-17-20 @ 06:10) (73 - 79)  BP: 131/76 (10-17-20 @ 06:10) (128/84 - 133/81)  RR: 18 (10-17-20 @ 06:10) (17 - 18)  SpO2: 96% (10-17-20 @ 06:10) (96% - 97%)  Wt(kg): --        10-16-20 @ 07:01  -  10-17-20 @ 07:00  --------------------------------------------------------  IN: 1800 mL / OUT: 2400 mL / NET: -600 mL      Physical Exam:  	Gen: NAD  	HEENT: MMM  	Pulm: CTA B/L  	CV: S1S2  	Abd: Soft, +BS  	Ext: No LE edema B/L                      Neuro: Awake   	Skin: Warm and Dry   	Vascular access: no hd catheter           gaston  LABS/STUDIES  --------------------------------------------------------------------------------              7.8    5.88  >-----------<  162      [10-16-20 @ 07:15]              24.2     138  |  109  |  63  ----------------------------<  92      [10-17-20 @ 07:07]  4.4   |  17  |  4.49        Ca     8.5     [10-17-20 @ 07:07]      Mg     1.7     [10-16-20 @ 07:15]            Creatinine Trend:  SCr 4.49 [10-17 @ 07:07]  SCr 4.35 [10-16 @ 22:38]  SCr 4.48 [10-16 @ 17:21]  SCr 4.60 [10-16 @ 07:15]  SCr 4.64 [10-15 @ 19:30]    Urinalysis - [10-11-20 @ 18:37]      Color Light Yellow / Appearance Slightly Turbid / SG 1.011 / pH 8.0      Gluc Trace / Ketone Negative  / Bili Negative / Urobili Negative       Blood Small / Protein 30 mg/dL / Leuk Est Large / Nitrite Negative      RBC 23 / WBC 82 / Hyaline 5 / Gran  / Sq Epi  / Non Sq Epi 0 / Bacteria Few      Iron 38, TIBC 205, %sat 19      [10-11-20 @ 09:26]  Ferritin 601      [10-11-20 @ 09:26]  TSH 1.01      [10-08-20 @ 02:55]    HCV 0.13, Nonreact      [10-08-20 @ 10:59]

## 2020-10-17 NOTE — PROGRESS NOTE ADULT - SUBJECTIVE AND OBJECTIVE BOX
Cardiovascular Disease Progress Note    Overnight events: No acute events overnight. Mr. Raymundo denies chest pain or SOB.     Otherwise review of systems negative    Objective Findings:  T(C): 36.7 (10-17-20 @ 06:10), Max: 37.1 (10-16-20 @ 20:01)  HR: 79 (10-17-20 @ 06:10) (73 - 79)  BP: 131/76 (10-17-20 @ 06:10) (128/84 - 133/81)  RR: 18 (10-17-20 @ 06:10) (17 - 18)  SpO2: 96% (10-17-20 @ 06:10) (96% - 97%)  Wt(kg): --  Daily     Daily       Physical Exam:  Gen: NAD; Patient resting comfortably  HEENT: EOMI, Normocephalic/ atraumatic  CV: RRR, normal S1 + S2, no m/r/g  Lungs:  Normal respiratory effort; clear to auscultation bilaterally  Abd: soft, non-tender; bowel sounds present  Ext: No edema; warm and well perfused    Telemetry: n/a    Laboratory Data:                        7.8    5.88  )-----------( 162      ( 16 Oct 2020 07:15 )             24.2     10-17    138  |  109<H>  |  63<H>  ----------------------------<  92  4.4   |  17<L>  |  4.49<H>    Ca    8.5      17 Oct 2020 07:07  Mg     1.7     10-16                Inpatient Medications:  MEDICATIONS  (STANDING):  aspirin  chewable 81 milliGRAM(s) Oral daily  finasteride 5 milliGRAM(s) Oral daily  heparin   Injectable 5000 Unit(s) SubCutaneous every 8 hours  influenza   Vaccine 0.5 milliLiter(s) IntraMuscular once  labetalol 100 milliGRAM(s) Oral two times a day  polyethylene glycol 3350 17 Gram(s) Oral daily  senna 2 Tablet(s) Oral at bedtime  sodium chloride 0.9%. 1000 milliLiter(s) (50 mL/Hr) IV Continuous <Continuous>  tamsulosin 0.4 milliGRAM(s) Oral at bedtime      Assessment: 75 year old man with CAD s/p PCI several years ago, HTN, and BPH presents with obstructive VENUS and a-fib with RVR.     Plan of Care:    #A-fib with RVR-  Driven by obstructive uropathy and profound renal injury in the ED.  No further a-fib was noted after Mr. Raymundo was admitted.   Given the transient nature of his a-fib, I would hold off on anticoagulation.   Echo reviewed- TR with mild pHTN noted.     #Elevated troponins-  Secondary to reduced creatinine clearance.  No plan for coronary ischemic work up at this time given VENUS.    #CAD s/p PCI-  PCI several years ago.   Continue ASA.    Over 25 minutes spent on total encounter; more than 50% of the visit was spent counseling and/or coordinating care by the attending physician.      Lionel Miller MD [facility]  Cardiovascular Disease  [phone]

## 2020-10-17 NOTE — PROGRESS NOTE ADULT - ASSESSMENT
75 y.o. male history of CAD with a stent not currently on blood thinners sent in from his PCP for abnormal labs.  Pt has not been feeling well over the past week or so. c/o general weakness, poor appetite, n/v x1 week. denied fevers, or chills.  Does report decreased PO and decreased BM and urine output. US performed by ED showed significant b/l hydro and urinary retention. Gaston placed drained 500cc urine. seen by  s/p gaston  nephrology consulted for VENUS    VENUS  likely sec to obstructive uropathy  CT with b/l hydro s/p Gaston .  Pt non oliguric   Follow up Urology  Renal function relatively stable   sonogram 10/9 with persistent severe hydronephrosis.   Follow up URology and IR  Per Urology Persistent hydro likely structural in the setting of chronic obstruction  Pt will need further work up for bladder obstruction. f/u urology    If  patient with worsening  uremic symptoms patient would require HD. No plan for HD at present given improving renal function   Consent for hd obtained in chart, risk and benefit explained to patient. All questions answered.     B/l hydronephrosis   CT with b/l hydro s/p Gaston .  Pt non oliguric   Follow up Urology    Proteinuria  in setting of obstruction/ gaston  Repeat UA  Monitor at present

## 2020-10-18 DIAGNOSIS — R50.9 FEVER, UNSPECIFIED: ICD-10-CM

## 2020-10-18 LAB
ANION GAP SERPL CALC-SCNC: 11 MMOL/L — SIGNIFICANT CHANGE UP (ref 5–17)
BUN SERPL-MCNC: 59 MG/DL — HIGH (ref 7–23)
CALCIUM SERPL-MCNC: 8.8 MG/DL — SIGNIFICANT CHANGE UP (ref 8.4–10.5)
CHLORIDE SERPL-SCNC: 111 MMOL/L — HIGH (ref 96–108)
CO2 SERPL-SCNC: 18 MMOL/L — LOW (ref 22–31)
CREAT SERPL-MCNC: 4.11 MG/DL — HIGH (ref 0.5–1.3)
GLUCOSE SERPL-MCNC: 91 MG/DL — SIGNIFICANT CHANGE UP (ref 70–99)
POTASSIUM SERPL-MCNC: 4.8 MMOL/L — SIGNIFICANT CHANGE UP (ref 3.5–5.3)
POTASSIUM SERPL-SCNC: 4.8 MMOL/L — SIGNIFICANT CHANGE UP (ref 3.5–5.3)
RAPID RVP RESULT: SIGNIFICANT CHANGE UP
SARS-COV-2 RNA SPEC QL NAA+PROBE: SIGNIFICANT CHANGE UP
SODIUM SERPL-SCNC: 140 MMOL/L — SIGNIFICANT CHANGE UP (ref 135–145)

## 2020-10-18 PROCEDURE — 71045 X-RAY EXAM CHEST 1 VIEW: CPT | Mod: 26

## 2020-10-18 RX ORDER — ACETAMINOPHEN 500 MG
650 TABLET ORAL ONCE
Refills: 0 | Status: COMPLETED | OUTPATIENT
Start: 2020-10-18 | End: 2020-10-18

## 2020-10-18 RX ADMIN — Medication 650 MILLIGRAM(S): at 12:30

## 2020-10-18 RX ADMIN — Medication 650 MILLIGRAM(S): at 12:01

## 2020-10-18 RX ADMIN — POLYETHYLENE GLYCOL 3350 17 GRAM(S): 17 POWDER, FOR SOLUTION ORAL at 17:22

## 2020-10-18 RX ADMIN — HEPARIN SODIUM 5000 UNIT(S): 5000 INJECTION INTRAVENOUS; SUBCUTANEOUS at 05:09

## 2020-10-18 RX ADMIN — HEPARIN SODIUM 5000 UNIT(S): 5000 INJECTION INTRAVENOUS; SUBCUTANEOUS at 13:02

## 2020-10-18 RX ADMIN — Medication 81 MILLIGRAM(S): at 11:09

## 2020-10-18 RX ADMIN — TAMSULOSIN HYDROCHLORIDE 0.4 MILLIGRAM(S): 0.4 CAPSULE ORAL at 22:06

## 2020-10-18 RX ADMIN — FINASTERIDE 5 MILLIGRAM(S): 5 TABLET, FILM COATED ORAL at 22:06

## 2020-10-18 RX ADMIN — SENNA PLUS 2 TABLET(S): 8.6 TABLET ORAL at 22:06

## 2020-10-18 RX ADMIN — SODIUM CHLORIDE 50 MILLILITER(S): 9 INJECTION INTRAMUSCULAR; INTRAVENOUS; SUBCUTANEOUS at 11:10

## 2020-10-18 RX ADMIN — HEPARIN SODIUM 5000 UNIT(S): 5000 INJECTION INTRAVENOUS; SUBCUTANEOUS at 22:06

## 2020-10-18 NOTE — CHART NOTE - NSCHARTNOTEFT_GEN_A_CORE
Called by RN to report pt with a temp of 100.8. Pt seen at bedside, states that he feels weak today. Denies Headache, CP, SOB, Abd/back pain. BC x 2 sent, CXR, RVP. Dr. Hamilton notified. Will consider ID consult and UA to be sent if pt becomes febrile again. Will continue to closely monitor.    Kalee Pool, MIKHAIL-c  [id number]

## 2020-10-18 NOTE — PROGRESS NOTE ADULT - SUBJECTIVE AND OBJECTIVE BOX
Cardiovascular Disease Progress Note    Overnight events: No acute events overnight. Patient denies chest pain or SOB.   Otherwise review of systems negative    Objective Findings:  T(C): 36.8 (10-18-20 @ 04:42), Max: 36.8 (10-18-20 @ 04:42)  HR: 75 (10-18-20 @ 04:42) (65 - 77)  BP: 156/82 (10-18-20 @ 04:42) (126/77 - 159/87)  RR: 17 (10-18-20 @ 04:42) (17 - 18)  SpO2: 95% (10-18-20 @ 04:42) (95% - 97%)  Wt(kg): --  Daily     Daily       Physical Exam:  Gen: NAD; Patient resting comfortably  HEENT: EOMI, Normocephalic/ atraumatic  CV: RRR, normal S1 + S2, no m/r/g  Lungs:  Normal respiratory effort; clear to auscultation bilaterally  Abd: soft, non-tender; bowel sounds present  Ext: No edema; warm and well perfused    Telemetry: n/a    Laboratory Data:    10-18    140  |  111<H>  |  59<H>  ----------------------------<  91  4.8   |  18<L>  |  4.11<H>    Ca    8.8      18 Oct 2020 07:16                Inpatient Medications:  MEDICATIONS  (STANDING):  aspirin  chewable 81 milliGRAM(s) Oral daily  finasteride 5 milliGRAM(s) Oral daily  heparin   Injectable 5000 Unit(s) SubCutaneous every 8 hours  influenza   Vaccine 0.5 milliLiter(s) IntraMuscular once  polyethylene glycol 3350 17 Gram(s) Oral daily  senna 2 Tablet(s) Oral at bedtime  sodium chloride 0.9%. 1000 milliLiter(s) (50 mL/Hr) IV Continuous <Continuous>  tamsulosin 0.4 milliGRAM(s) Oral at bedtime      Assessment: 75 year old man with CAD s/p PCI several years ago, HTN, and BPH presents with obstructive VENUS and a-fib with RVR.     Plan of Care:    #A-fib with RVR-  Driven by obstructive uropathy and profound renal injury in the ED.  No further a-fib was noted after Mr. Raymundo was admitted.   Given the transient nature of his a-fib, I would hold off on anticoagulation.   Echo reviewed- TR with mild pHTN noted.     #Elevated troponins-  Secondary to reduced creatinine clearance.  No plan for coronary ischemic work up at this time given VENUS.    #CAD s/p PCI-  PCI several years ago.   Continue ASA.      #ACP (advance care planning)-  Advanced care planning was discussed with the patient. All questions were answered. 30 minutes spent addressing advance care plans.      Over 25 minutes spent on total encounter; more than 50% of the visit was spent counseling and/or coordinating care by the attending physician.      Lionel Miller MD [facility]  Cardiovascular Disease  [phone]

## 2020-10-18 NOTE — PROGRESS NOTE ADULT - SUBJECTIVE AND OBJECTIVE BOX
[facility] [place] [facility]   MD GABO CAMEJO MD RUORU WONG, PA    TEL:  OFFICE: [phone]  DR KIM CELL: [phone]  JEROME DSOUZA CELL: [phone]  DR. SINGH CELL: [phone]  DR. JACOB CELL: [phone]    FROM 5 PM - 7 AM PLEASE CALL ANSWERING SERVICE: [phone]    RENAL FOLLOW UP NOTE--Date of Service 10-18-20 @ 09:20  --------------------------------------------------------------------------------  HPI:      Pt seen and examined at bedside.   Denies SOB, chest pain     PAST HISTORY  --------------------------------------------------------------------------------  No significant changes to PMH, PSH, FHx, SHx, unless otherwise noted    ALLERGIES & MEDICATIONS  --------------------------------------------------------------------------------  Allergies    penicillin (Rash)    Intolerances      Standing Inpatient Medications  aspirin  chewable 81 milliGRAM(s) Oral daily  finasteride 5 milliGRAM(s) Oral daily  heparin   Injectable 5000 Unit(s) SubCutaneous every 8 hours  influenza   Vaccine 0.5 milliLiter(s) IntraMuscular once  polyethylene glycol 3350 17 Gram(s) Oral daily  senna 2 Tablet(s) Oral at bedtime  sodium chloride 0.9%. 1000 milliLiter(s) IV Continuous <Continuous>  tamsulosin 0.4 milliGRAM(s) Oral at bedtime    PRN Inpatient Medications      REVIEW OF SYSTEMS  --------------------------------------------------------------------------------  General: no fever  CVS: no chest pain  RESP: no sob, no cough  ABD: no abdominal pain  MSK: no edema     VITALS/PHYSICAL EXAM  --------------------------------------------------------------------------------  T(C): 36.8 (10-18-20 @ 04:42), Max: 36.8 (10-18-20 @ 04:42)  HR: 75 (10-18-20 @ 04:42) (65 - 77)  BP: 156/82 (10-18-20 @ 04:42) (126/77 - 159/87)  RR: 17 (10-18-20 @ 04:42) (17 - 18)  SpO2: 95% (10-18-20 @ 04:42) (95% - 97%)  Wt(kg): --        10-17-20 @ 07:01  -  10-18-20 @ 07:00  --------------------------------------------------------  IN: 2890 mL / OUT: 2175 mL / NET: 715 mL      Physical Exam:  	Gen: NAD  	HEENT: MMM  	Pulm: CTA B/L  	CV: S1S2  	Abd: Soft, +BS  	Ext: No LE edema B/L                      Neuro: Awake   	Skin: Warm and Dry   	Vascular access: no hd catheter           gaston  LABS/STUDIES  --------------------------------------------------------------------------------    140  |  111  |  59  ----------------------------<  91      [10-18-20 @ 07:16]  4.8   |  18  |  4.11        Ca     8.8     [10-18-20 @ 07:16]            Creatinine Trend:  SCr 4.11 [10-18 @ 07:16]  SCr 4.18 [10-17 @ 17:30]  SCr 4.49 [10-17 @ 07:07]  SCr 4.35 [10-16 @ 22:38]  SCr 4.48 [10-16 @ 17:21]    Urinalysis - [10-11-20 @ 18:37]      Color Light Yellow / Appearance Slightly Turbid / SG 1.011 / pH 8.0      Gluc Trace / Ketone Negative  / Bili Negative / Urobili Negative       Blood Small / Protein 30 mg/dL / Leuk Est Large / Nitrite Negative      RBC 23 / WBC 82 / Hyaline 5 / Gran  / Sq Epi  / Non Sq Epi 0 / Bacteria Few      Iron 38, TIBC 205, %sat 19      [10-11-20 @ 09:26]  Ferritin 601      [10-11-20 @ 09:26]  TSH 1.01      [10-08-20 @ 02:55]    HCV 0.13, Nonreact      [10-08-20 @ 10:59]

## 2020-10-18 NOTE — PROVIDER CONTACT NOTE (OTHER) - BACKGROUND
patient admitted for hydronephrosis/ARF. Patient has been + for orthostatic hypotension, Labetalol currently on hold

## 2020-10-18 NOTE — PROGRESS NOTE ADULT - ASSESSMENT
75 y.o. male history of CAD with a stent not currently on blood thinners sent in from his PCP for abnormal labs.  Pt has not been feeling well over the past week or so. c/o general weakness, poor appetite, n/v x1 week. denied fevers, or chills.  Does report decreased PO and decreased BM and urine output. US performed by ED showed significant b/l hydro and urinary retention. Gaston placed drained 500cc urine. seen by  s/p gaston  nephrology consulted for VENUS    VENUS  likely sec to obstructive uropathy  CT with b/l hydro s/p Gaston .  Pt non oliguric   Follow up Urology  Renal function improving   sonogram 10/9 with persistent severe hydronephrosis.   Follow up URology and IR  Per Urology Persistent hydro likely structural in the setting of chronic obstruction  Pt will need further work up for bladder obstruction. f/u urology    If  patient with worsening  uremic symptoms patient would require HD. No plan for HD at present given improving renal function   Consent for hd obtained in chart, risk and benefit explained to patient. All questions answered.     B/l hydronephrosis   CT with b/l hydro s/p Gaston .  Pt non oliguric   Follow up Urology    Proteinuria  in setting of obstruction/ gaston  Repeat UA  Monitor at present

## 2020-10-18 NOTE — PROGRESS NOTE ADULT - SUBJECTIVE AND OBJECTIVE BOX
HPI:  75yr Male with  pmhx cad s/p stent 13 years ago, htn, BPH and nephrolithiasis 28 yrs ago s/p lithotripsy sent in by his PCP for abnormal labs.   Patient states that he is feels weak and tired, difficulty urinating over the past few weeks, associated with poor appetite and nausea. Denies dysuria, abdominal pain, fevers. No hx vomiting/ abdominal pain. Patient was on Flomax which was discontinued 3 months ago, started on Ditropan last week by his urologist. Patient went to see his pmd and was referred to ed for abnormal blood work- elevated Creatinine.     In the ed patient noted to have creatinine 9.98 in ED. US, CT abdomen shows  performed by ED showed significant b/l hydro and urinary retention with enlarged Prostate. . Garcia placed drained 500cc urine. Urology consulted- recommend conservative management.   In the ed patient noted to have A.fib with rvr- converted to sinus with fluids.   night and urinary incontinence. He was previously on flomax but stopped 3 months ago. Placed on finasteride by PMD, and more recently on ditropan by urologist Dr. Mclaughlin (07 Oct 2020 18:02)      Patient is a 75y old  Male who presents with a chief complaint of Patient was sent by his pmd for abnormal labs (08 Oct 2020 10:50)      SUBJECTIVE / OVERNIGHT EVENTS: Patient feels ok, spiked low grade fever.   Denies cough, abdominal pain.   No events overnight.     T(C): 36.4 (10-16-20 @ 14:53), Max: 36.4 (10-16-20 @ 14:53)  HR: --70  BP: --126/77  RR: 18 (10-16-20 @ 14:53) (18 - 18)  SpO2: 96% (10-16-20 @ 14:53) (96% - 96%)      MEDICATIONS  (STANDING):  aspirin  chewable 81 milliGRAM(s) Oral daily  finasteride 5 milliGRAM(s) Oral daily  heparin   Injectable 5000 Unit(s) SubCutaneous every 8 hours  influenza   Vaccine 0.5 milliLiter(s) IntraMuscular once  polyethylene glycol 3350 17 Gram(s) Oral daily  senna 2 Tablet(s) Oral at bedtime  sodium chloride 0.9%. 1000 milliLiter(s) (50 mL/Hr) IV Continuous <Continuous>  tamsulosin 0.4 milliGRAM(s) Oral at bedtime    MEDICATIONS  (PRN):    PHYSICAL EXAM:  GENERAL: NAD, well-developed  HEAD:  Atraumatic, Normocephalic  EYES: EOMI, conjunctiva and sclera clear  NECK: Supple, No JVD  CHEST/LUNG: Clear to auscultation bilaterally; No wheeze  HEART: Regular rate and rhythm; No murmurs, rubs, or gallops  ABDOMEN: Soft, Nontender, Nondistended; Bowel sounds present  EXTREMITIES:  2+ Peripheral Pulses, No clubbing, cyanosis, or edema  PSYCH: AAOx3  NEUROLOGY: non-focal  SKIN: No rashes or lesions                                        140|111|59<91  4.8|18|4.11  8.8,--,--  10-18 @ 07:16                                     CAPILLARY BLOOD GLUCOSE          RADIOLOGY & ADDITIONAL TESTS:    Imaging Personally Reviewed:    Consultant(s) Notes Reviewed:  renal, cards     Care Discussed with Consultants/Other Providers:

## 2020-10-19 LAB
ANION GAP SERPL CALC-SCNC: 10 MMOL/L — SIGNIFICANT CHANGE UP (ref 5–17)
BUN SERPL-MCNC: 59 MG/DL — HIGH (ref 7–23)
CALCIUM SERPL-MCNC: 8.7 MG/DL — SIGNIFICANT CHANGE UP (ref 8.4–10.5)
CHLORIDE SERPL-SCNC: 108 MMOL/L — SIGNIFICANT CHANGE UP (ref 96–108)
CO2 SERPL-SCNC: 19 MMOL/L — LOW (ref 22–31)
CREAT SERPL-MCNC: 4.22 MG/DL — HIGH (ref 0.5–1.3)
GLUCOSE SERPL-MCNC: 91 MG/DL — SIGNIFICANT CHANGE UP (ref 70–99)
POTASSIUM SERPL-MCNC: 4.5 MMOL/L — SIGNIFICANT CHANGE UP (ref 3.5–5.3)
POTASSIUM SERPL-SCNC: 4.5 MMOL/L — SIGNIFICANT CHANGE UP (ref 3.5–5.3)
SODIUM SERPL-SCNC: 137 MMOL/L — SIGNIFICANT CHANGE UP (ref 135–145)

## 2020-10-19 RX ADMIN — HEPARIN SODIUM 5000 UNIT(S): 5000 INJECTION INTRAVENOUS; SUBCUTANEOUS at 21:18

## 2020-10-19 RX ADMIN — FINASTERIDE 5 MILLIGRAM(S): 5 TABLET, FILM COATED ORAL at 21:18

## 2020-10-19 RX ADMIN — SODIUM CHLORIDE 50 MILLILITER(S): 9 INJECTION INTRAMUSCULAR; INTRAVENOUS; SUBCUTANEOUS at 05:25

## 2020-10-19 RX ADMIN — HEPARIN SODIUM 5000 UNIT(S): 5000 INJECTION INTRAVENOUS; SUBCUTANEOUS at 05:31

## 2020-10-19 RX ADMIN — HEPARIN SODIUM 5000 UNIT(S): 5000 INJECTION INTRAVENOUS; SUBCUTANEOUS at 14:50

## 2020-10-19 RX ADMIN — TAMSULOSIN HYDROCHLORIDE 0.4 MILLIGRAM(S): 0.4 CAPSULE ORAL at 21:18

## 2020-10-19 RX ADMIN — Medication 81 MILLIGRAM(S): at 11:12

## 2020-10-19 NOTE — PROGRESS NOTE ADULT - PROBLEM SELECTOR PROBLEM 1
Acute kidney injury superimposed on CKD
Fever, unspecified fever cause
Fever, unspecified fever cause
Orthostatic hypotension

## 2020-10-19 NOTE — PROGRESS NOTE ADULT - SUBJECTIVE AND OBJECTIVE BOX
HPI:  75yr Male with  pmhx cad s/p stent 13 years ago, htn, BPH and nephrolithiasis 28 yrs ago s/p lithotripsy sent in by his PCP for abnormal labs.   Patient states that he is feels weak and tired, difficulty urinating over the past few weeks, associated with poor appetite and nausea. Denies dysuria, abdominal pain, fevers. No hx vomiting/ abdominal pain. Patient was on Flomax which was discontinued 3 months ago, started on Ditropan last week by his urologist. Patient went to see his pmd and was referred to ed for abnormal blood work- elevated Creatinine.     In the ed patient noted to have creatinine 9.98 in ED. US, CT abdomen shows  performed by ED showed significant b/l hydro and urinary retention with enlarged Prostate. . Garcia placed drained 500cc urine. Urology consulted- recommend conservative management.   In the ed patient noted to have A.fib with rvr- converted to sinus with fluids.   night and urinary incontinence. He was previously on flomax but stopped 3 months ago. Placed on finasteride by PMD, and more recently on ditropan by urologist Dr. Mclaughlin (07 Oct 2020 18:02)      Patient is a 75y old  Male who presents with a chief complaint of Patient was sent by his pmd for abnormal labs (08 Oct 2020 10:50)      SUBJECTIVE / OVERNIGHT EVENTS: Patient feels ok, spiked low grade fever.   Denies cough, abdominal pain.   No events overnight.     T(C): 36.6 (10-19-20 @ 12:01), Max: 36.9 (10-19-20 @ 08:19)  HR: 90 (10-19-20 @ 12:01) (71 - 90)  BP: 116/73 (10-19-20 @ 12:01) (116/73 - 157/87)  RR: 18 (10-19-20 @ 12:01) (18 - 18)  SpO2: 97% (10-19-20 @ 12:01) (96% - 97%)    MEDICATIONS  (STANDING):  aspirin  chewable 81 milliGRAM(s) Oral daily  finasteride 5 milliGRAM(s) Oral daily  heparin   Injectable 5000 Unit(s) SubCutaneous every 8 hours  influenza   Vaccine 0.5 milliLiter(s) IntraMuscular once  polyethylene glycol 3350 17 Gram(s) Oral daily  senna 2 Tablet(s) Oral at bedtime  tamsulosin 0.4 milliGRAM(s) Oral at bedtime    MEDICATIONS  (PRN):      PHYSICAL EXAM:  GENERAL: NAD, well-developed  HEAD:  Atraumatic, Normocephalic  EYES: EOMI, conjunctiva and sclera clear  NECK: Supple, No JVD  CHEST/LUNG: Clear to auscultation bilaterally; No wheeze  HEART: Regular rate and rhythm; No murmurs, rubs, or gallops  ABDOMEN: Soft, Nontender, Nondistended; Bowel sounds present  EXTREMITIES:  2+ Peripheral Pulses, No clubbing, cyanosis, or edema  PSYCH: AAOx3  NEUROLOGY: non-focal  SKIN: No rashes or lesions                                          137|108|59<91  4.5|19|4.22  8.7,--,--  10-19 @ 06:33                                   CAPILLARY BLOOD GLUCOSE          RADIOLOGY & ADDITIONAL TESTS:    Imaging Personally Reviewed:    Consultant(s) Notes Reviewed:  renal, cards     Care Discussed with Consultants/Other Providers:

## 2020-10-19 NOTE — PROGRESS NOTE ADULT - SUBJECTIVE AND OBJECTIVE BOX
[facility]   MD GABO CAMEJO MD RUORU WONG, PA    TEL:  OFFICE: [phone]  DR KIM CELL: [phone]  JEROME DSOUZA CELL: [phone]  DR. SINGH CELL: [phone]  DR. JACOB CELL: [phone]    FROM 5 PM - 7 AM PLEASE CALL ANSWERING SERVICE: [phone]    RENAL FOLLOW UP NOTE--Date of Service 10-19-20 @ 09:20  --------------------------------------------------------------------------------  HPI:      Pt seen and examined at bedside.   Denies SOB, chest pain     PAST HISTORY  --------------------------------------------------------------------------------  No significant changes to PMH, PSH, FHx, SHx, unless otherwise noted    ALLERGIES & MEDICATIONS  --------------------------------------------------------------------------------  Allergies    penicillin (Rash)    Intolerances      Standing Inpatient Medications  aspirin  chewable 81 milliGRAM(s) Oral daily  finasteride 5 milliGRAM(s) Oral daily  heparin   Injectable 5000 Unit(s) SubCutaneous every 8 hours  influenza   Vaccine 0.5 milliLiter(s) IntraMuscular once  polyethylene glycol 3350 17 Gram(s) Oral daily  senna 2 Tablet(s) Oral at bedtime  sodium chloride 0.9%. 1000 milliLiter(s) IV Continuous <Continuous>  tamsulosin 0.4 milliGRAM(s) Oral at bedtime    PRN Inpatient Medications      REVIEW OF SYSTEMS  --------------------------------------------------------------------------------  General: no fever  CVS: no chest pain  RESP: no sob, no cough  ABD: no abdominal pain  : no dysuria,  MSK: no edema     VITALS/PHYSICAL EXAM  --------------------------------------------------------------------------------  T(C): 36.9 (10-19-20 @ 08:19), Max: 38.2 (10-18-20 @ 11:24)  HR: 77 (10-19-20 @ 08:19) (71 - 80)  BP: 131/84 (10-19-20 @ 08:19) (131/84 - 190/90)  RR: 18 (10-19-20 @ 08:19) (18 - 18)  SpO2: 97% (10-19-20 @ 08:19) (96% - 98%)  Wt(kg): --        10-18-20 @ 07:01  -  10-19-20 @ 07:00  --------------------------------------------------------  IN: 2040 mL / OUT: 2800 mL / NET: -760 mL      Physical Exam:  	Gen: NAD  	HEENT: MMM  	Pulm: CTA B/L  	CV: S1S2  	Abd: Soft, +BS  	Ext: No LE edema B/L                      Neuro: Awake   	Skin: Warm and Dry   	Vascular access:          HEMALATHA gaston  LABS/STUDIES  --------------------------------------------------------------------------------    137  |  108  |  59  ----------------------------<  91      [10-19-20 @ 06:33]  4.5   |  19  |  4.22        Ca     8.7     [10-19-20 @ 06:33]            Creatinine Trend:  SCr 4.22 [10-19 @ 06:33]  SCr 4.11 [10-18 @ 07:16]  SCr 4.18 [10-17 @ 17:30]  SCr 4.49 [10-17 @ 07:07]  SCr 4.35 [10-16 @ 22:38]    Urinalysis - [10-11-20 @ 18:37]      Color Light Yellow / Appearance Slightly Turbid / SG 1.011 / pH 8.0      Gluc Trace / Ketone Negative  / Bili Negative / Urobili Negative       Blood Small / Protein 30 mg/dL / Leuk Est Large / Nitrite Negative      RBC 23 / WBC 82 / Hyaline 5 / Gran  / Sq Epi  / Non Sq Epi 0 / Bacteria Few      Iron 38, TIBC 205, %sat 19      [10-11-20 @ 09:26]  Ferritin 601      [10-11-20 @ 09:26]  TSH 1.01      [10-08-20 @ 02:55]    HCV 0.13, Nonreact      [10-08-20 @ 10:59]     [facility] [place] [facility]   MD GABO CAMEJO MD RUORU WONG, PA    TEL:  OFFICE: [phone]  DR KIM CELL: [phone]  JEROME DSOUZA CELL: [phone]  DR. SINGH CELL: [phone]  DR. JACOB CELL: [phone]    FROM 5 PM - 7 AM PLEASE CALL ANSWERING SERVICE: [phone]    RENAL FOLLOW UP NOTE--Date of Service 10-19-20 @ 09:20  --------------------------------------------------------------------------------  HPI:      Pt seen and examined at bedside.   pt sleepy    PAST HISTORY  --------------------------------------------------------------------------------  No significant changes to PMH, PSH, FHx, SHx, unless otherwise noted    ALLERGIES & MEDICATIONS  --------------------------------------------------------------------------------  Allergies    penicillin (Rash)    Intolerances      Standing Inpatient Medications  aspirin  chewable 81 milliGRAM(s) Oral daily  finasteride 5 milliGRAM(s) Oral daily  heparin   Injectable 5000 Unit(s) SubCutaneous every 8 hours  influenza   Vaccine 0.5 milliLiter(s) IntraMuscular once  polyethylene glycol 3350 17 Gram(s) Oral daily  senna 2 Tablet(s) Oral at bedtime  sodium chloride 0.9%. 1000 milliLiter(s) IV Continuous <Continuous>  tamsulosin 0.4 milliGRAM(s) Oral at bedtime    PRN Inpatient Medications      REVIEW OF SYSTEMS  --------------------------------------------------------------------------------  General: no fever  ABD: no abdominal pain  MSK: no edema     VITALS/PHYSICAL EXAM  --------------------------------------------------------------------------------  T(C): 36.9 (10-19-20 @ 08:19), Max: 38.2 (10-18-20 @ 11:24)  HR: 77 (10-19-20 @ 08:19) (71 - 80)  BP: 131/84 (10-19-20 @ 08:19) (131/84 - 190/90)  RR: 18 (10-19-20 @ 08:19) (18 - 18)  SpO2: 97% (10-19-20 @ 08:19) (96% - 98%)  Wt(kg): --        10-18-20 @ 07:01  -  10-19-20 @ 07:00  --------------------------------------------------------  IN: 2040 mL / OUT: 2800 mL / NET: -760 mL      Physical Exam:  	Gen: NAD  	HEENT: MMM  	Pulm: CTA B/L  	CV: S1S2  	Abd: Soft, +BS  	Ext: No LE edema B/L                      Neuro: Awake   	Skin: Warm and Dry   	Vascular access no hd catheter          HEMALATHA gaston  LABS/STUDIES  --------------------------------------------------------------------------------    137  |  108  |  59  ----------------------------<  91      [10-19-20 @ 06:33]  4.5   |  19  |  4.22        Ca     8.7     [10-19-20 @ 06:33]            Creatinine Trend:  SCr 4.22 [10-19 @ 06:33]  SCr 4.11 [10-18 @ 07:16]  SCr 4.18 [10-17 @ 17:30]  SCr 4.49 [10-17 @ 07:07]  SCr 4.35 [10-16 @ 22:38]    Urinalysis - [10-11-20 @ 18:37]      Color Light Yellow / Appearance Slightly Turbid / SG 1.011 / pH 8.0      Gluc Trace / Ketone Negative  / Bili Negative / Urobili Negative       Blood Small / Protein 30 mg/dL / Leuk Est Large / Nitrite Negative      RBC 23 / WBC 82 / Hyaline 5 / Gran  / Sq Epi  / Non Sq Epi 0 / Bacteria Few      Iron 38, TIBC 205, %sat 19      [10-11-20 @ 09:26]  Ferritin 601      [10-11-20 @ 09:26]  TSH 1.01      [10-08-20 @ 02:55]    HCV 0.13, Nonreact      [10-08-20 @ 10:59]

## 2020-10-19 NOTE — PROGRESS NOTE ADULT - ASSESSMENT
75 y.o. male history of CAD with a stent not currently on blood thinners sent in from his PCP for abnormal labs.  Pt has not been feeling well over the past week or so. c/o general weakness, poor appetite, n/v x1 week. denied fevers, or chills.  Does report decreased PO and decreased BM and urine output. US performed by ED showed significant b/l hydro and urinary retention. Gaston placed drained 500cc urine. seen by  s/p gaston  nephrology consulted for VENUS    VENUS  likely sec to obstructive uropathy  CT with b/l hydro s/p Gaston .  Pt non oliguric   Follow up Urology  Renal function improving   sonogram 10/9 with persistent severe hydronephrosis.   Follow up URology and IR  Per Urology Persistent hydro likely structural in the setting of chronic obstruction  Pt will need further work up for bladder obstruction. f/u urology    If  patient with worsening  uremic symptoms patient would require HD. No plan for HD at present given improving renal function   Consent for hd obtained in chart, risk and benefit explained to patient. All questions answered.     B/l hydronephrosis   CT with b/l hydro s/p Gaston .  Pt non oliguric   Follow up Urology    Proteinuria  in setting of obstruction/ gaston  Repeat UA  Monitor at present   75 y.o. male history of CAD with a stent not currently on blood thinners sent in from his PCP for abnormal labs.  Pt has not been feeling well over the past week or so. c/o general weakness, poor appetite, n/v x1 week. denied fevers, or chills.  Does report decreased PO and decreased BM and urine output. US performed by ED showed significant b/l hydro and urinary retention. Gaston placed drained 500cc urine. seen by  s/p gaston  nephrology consulted for VENUS    VENUS  likely sec to obstructive uropathy  CT with b/l hydro s/p Gaston .  Pt non oliguric   Follow up Urology  Renal function fluctuating   sonogram 10/9 with persistent severe hydronephrosis.   Follow up URology and IR  Per Urology Persistent hydro likely structural in the setting of chronic obstruction  Pt will need further work up for bladder obstruction. f/u urology    If  patient with worsening  uremic symptoms patient would require HD. No plan for HD at present given improving renal function   Consent for hd obtained in chart, risk and benefit explained to patient. All questions answered.     B/l hydronephrosis   CT with b/l hydro s/p Gaston .  Pt non oliguric   Follow up Urology    Proteinuria  in setting of obstruction/ gaston  Repeat UA  Monitor at present

## 2020-10-19 NOTE — PROGRESS NOTE ADULT - PROBLEM SELECTOR PROBLEM 5
Coronary artery disease involving native coronary artery of native heart without angina pectoris
Essential hypertension
Essential hypertension

## 2020-10-19 NOTE — PROGRESS NOTE ADULT - PROBLEM SELECTOR PLAN 3
Continue with Metoprolol
Converted to sinus in the ed with iv hydration.   Continue to monitor on tele for now.    Cards following. .
Bilateral moderate to severe hydroureteronephrosis, post obstructive Uropathy.    Continue with gaston, Flomax. Continue with Finasteride.   Renal, urology consults appreciated.  Monitor renal function.  IR consulted recommend ureteral stent placement.  Urology recommend to monitor renal function and hold off cystoscopy with U. stent placement for now. Renal sono shows moderate hydronephrosis.   Spoke to Urology at length patient needs out patient follow up for repeat Sono to eval progression of Hydronephrosis. No plans of intervention at this time.   D/C planning.
Bilateral moderate to severe hydroureteronephrosis, post obstructive Uropathy.    Continue with gaston, Flomax. Continue with Finasteride.   Renal, urology consults appreciated.  Monitor renal function.  IR consulted recommend ureteral stent placement.  Urology recommend to monitor renal function and hold off cystoscopy with U. stent placement for now. Renal sono shows moderate hydronephrosis.   Spoke to Urology at length patient needs out patient follow up for repeat Sono to eval progression of Hydronephrosis. No plans of intervention at this time.   D/C planning.
Converted to sinus in the ed with iv hydration.   Cards following. Rate controlled. No need for AC as per cards.
Converted to sinus in the ed with iv hydration.   Cards following. Rate controlled. No need for AC as per cards.

## 2020-10-19 NOTE — PROGRESS NOTE ADULT - PROBLEM SELECTOR PLAN 2
Converted to sinus in the ed with iv hydration.   Continue to monitor on tele for now.    Cards consult appreciated.  Follow up echo.
Converted to sinus in the ed with iv hydration.   Continue to monitor on tele for now.    Cards following. .
Converted to sinus in the ed with iv hydration.   Continue to monitor on tele for now.    Cards following. .
s/p NSVT 12 seconds without hemodynamic compromise.   Continue to monitor on Labatelol.
Bilateral moderate to severe hydroureteronephrosis, post obstructive Uropathy.    Continue with gaston, Flomax. Continue with Finasteride.   Renal, urology consults appreciated.  Monitor renal function.  IR consulted recommend ureteral stent placement.  Urology recommend to monitor renal function and hold off cystoscopy with U. stent placement for now. Renal sono shows moderate hydronephrosis.   Spoke to Urology at length patient needs out patient follow up for repeat Sono to eval progression of Hydronephrosis. No plans of intervention at this time.   D/C planning.
Orthostatic hypotension, Likely from Orthostatics. Continue with iv Hydration.   Follow up AM Cortisol.
Orthostatic hypotension- resolving with fluids.   D/C fluids, monitor orthostatics.    D/C planning if stable.
Resolved, off tele. Continue with Labetalol.

## 2020-10-19 NOTE — PROGRESS NOTE ADULT - PROBLEM SELECTOR PLAN 4
Continue with Aspirin.
Continue with Metoprolol
Converted to sinus in the ed with iv hydration.   Cards following. Rate controlled. No need for AC as per cards.
Converted to sinus in the ed with iv hydration.   Cards following. Rate controlled. No need for AC as per cards.

## 2020-10-19 NOTE — PROGRESS NOTE ADULT - PROBLEM SELECTOR PROBLEM 3
Essential hypertension
Rapid atrial fibrillation
Acute kidney injury superimposed on CKD
Acute kidney injury superimposed on CKD
Rapid atrial fibrillation
Rapid atrial fibrillation

## 2020-10-19 NOTE — PROGRESS NOTE ADULT - SUBJECTIVE AND OBJECTIVE BOX
Cardiovascular Disease Progress Note    Overnight events: No acute events overnight. Mr. Raymundo is complaining of itchiness.     Otherwise review of systems negative    Objective Findings:  T(C): 36.6 (10-19-20 @ 05:34), Max: 38.2 (10-18-20 @ 11:24)  HR: 71 (10-19-20 @ 05:34) (71 - 80)  BP: 157/87 (10-19-20 @ 05:34) (157/87 - 190/90)  RR: 18 (10-19-20 @ 05:34) (18 - 18)  SpO2: 96% (10-19-20 @ 05:34) (96% - 98%)  Wt(kg): --  Daily     Daily       Physical Exam:  Gen: NAD; Patient resting comfortably  HEENT: EOMI, Normocephalic/ atraumatic  CV: RRR, normal S1 + S2, no m/r/g  Lungs:  Normal respiratory effort; clear to auscultation bilaterally  Abd: soft, non-tender; bowel sounds present  Ext: No edema; warm and well perfused    Telemetry: n/a    Laboratory Data:    10-19    137  |  108  |  59<H>  ----------------------------<  91  4.5   |  19<L>  |  4.22<H>    Ca    8.7      19 Oct 2020 06:33                Inpatient Medications:  MEDICATIONS  (STANDING):  aspirin  chewable 81 milliGRAM(s) Oral daily  finasteride 5 milliGRAM(s) Oral daily  heparin   Injectable 5000 Unit(s) SubCutaneous every 8 hours  influenza   Vaccine 0.5 milliLiter(s) IntraMuscular once  polyethylene glycol 3350 17 Gram(s) Oral daily  senna 2 Tablet(s) Oral at bedtime  sodium chloride 0.9%. 1000 milliLiter(s) (50 mL/Hr) IV Continuous <Continuous>  tamsulosin 0.4 milliGRAM(s) Oral at bedtime      Assessment: 75 year old man with CAD s/p PCI several years ago, HTN, and BPH presents with obstructive VENUS and a-fib with RVR.     Plan of Care:    #A-fib with RVR-  Driven by obstructive uropathy and profound renal injury in the ED.  No further a-fib was noted after Mr. Raymundo was admitted.   Given the transient nature of his a-fib, I would hold off on anticoagulation.   Echo reviewed- TR with mild pHTN noted.     #HTN-  BP elevated overnight.  I would stop IV fluids, as patient has adequate PO intake.  If BP is persistently elevated after stopping IVF, start Coreg 6.25 mg PO BID.     #Elevated troponins-  Secondary to reduced creatinine clearance.  No plan for coronary ischemic work up at this time given VENUS.    #CAD s/p PCI-  PCI several years ago.   Continue ASA.        Over 25 minutes spent on total encounter; more than 50% of the visit was spent counseling and/or coordinating care by the attending physician.      Lionel Miller MD [facility]  Cardiovascular Disease  [phone]

## 2020-10-19 NOTE — PROGRESS NOTE ADULT - PROBLEM SELECTOR PLAN 1
Bilateral moderate to severe hydroureteronephrosis, post obstructive Uropathy.    Continue with gaston, Flomax. Continue with Finasteride.   Renal, urology consults appreciated.  Monitor renal function.  IR consulted recommend ureteral stent placement.  Urology recommend to monitor renal function and hold off cystoscopy with U. stent placement for now.
Bilateral moderate to severe hydroureteronephrosis, post obstructive Uropathy.    Continue with gaston, Flomax. Continue with Finasteride.   Renal, urology consults appreciated.  Monitor renal function.  IR consulted recommend ureteral stent placement.  Urology recommend to monitor renal function and hold off cystoscopy with U. stent placement for now.  Continue with iv fluids.
Bilateral moderate to severe hydroureteronephrosis, post obstructive Uropathy.    Continue with gaston, Flomax. Continue with Finasteride.   Renal, urology consults appreciated.  Monitor renal function.  IR consulted recommend ureteral stent placement.  Urology recommend to monitor renal function and hold off cystoscopy with U. stent placement for now.  Continue with iv fluids.  Follow up repeat ABDOMEN SONO am tomorrow.
Bilateral moderate to severe hydroureteronephrosis, post obstructive Uropathy.    Continue with gaston, Flomax. Continue with Finasteride.   Renal, urology consults appreciated.  Monitor renal function.  IR consulted recommend ureteral stent placement.  Urology recommend to monitor renal function and hold off cystoscopy with U. stent placement for now.  Continue with iv fluids.  Follow up repeat abdomen sono.
Bilateral moderate to severe hydroureteronephrosis, post obstructive Uropathy.    Continue with gaston, Flomax. Continue with Finasteride.   Renal, urology consults appreciated.  Monitor renal function.  IR consulted recommend ureteral stent placement.  Urology recommend to monitor renal function for now. Hold off Ureteral stent placement for now.
Bilateral moderate to severe hydroureteronephrosis, post obstructive Uropathy.    Continue with gaston, Flomax. Continue with Finasteride.   Renal, urology consults appreciated.  Monitor renal function. Urology recommend b/l nephrostomy tube, IR consulted recommend ureteral stent placement.
Bilateral moderate to severe hydroureteronephrosis, post obstructive Uropathy.    Continue with gaston, add Flomax. Continue with Finasteride.   Renal, urology consults appreciated.  Monitor renal function.
Bilateral moderate to severe hydroureteronephrosis, post obstructive Uropathy.    Continue with gaston, add Flomax. Continue with Finasteride.   Renal, urology consults appreciated.  Monitor renal function.
Bilateral moderate to severe hydroureteronephrosis, post obstructive Uropathy.    Continue with gaston, Flomax. Continue with Finasteride.   Renal, urology consults appreciated.  Monitor renal function.  IR consulted recommend ureteral stent placement.  Urology recommend to monitor renal function and hold off cystoscopy with U. stent placement for now. Renal sono shows moderate hydronephrosis.   Spoke to Urology at length patient needs out patient follow up for repeat Sono to eval progression of Hydronephrosis. No plans of intervention at this time.   D/C planning.
Discharge held due to orthostatic hypotension, NS bolus continue with iv hydration. Discontinue Labetalol.  Monitor orthostatics.
Patient remains afebrile, cxr no acute findings, blood cultures show no growth to date. Covid negative.  D/C planning am tomorrow.
low grade fever, cxr no acute findings, follow up blood cultures.   Covid negative.

## 2020-10-19 NOTE — PROGRESS NOTE ADULT - PROBLEM SELECTOR PLAN 5
Continue with Aspirin.
Continue with Metoprolol
Continue with Metoprolol

## 2020-10-19 NOTE — PROGRESS NOTE ADULT - PROBLEM SELECTOR PROBLEM 4
Coronary artery disease involving native coronary artery of native heart without angina pectoris
Essential hypertension
Rapid atrial fibrillation
Rapid atrial fibrillation

## 2020-10-19 NOTE — PROGRESS NOTE ADULT - PROBLEM SELECTOR PROBLEM 2
NSVT (nonsustained ventricular tachycardia)
Rapid atrial fibrillation
Acute kidney injury superimposed on CKD
NSVT (nonsustained ventricular tachycardia)
Orthostatic hypotension
Orthostatic hypotension

## 2020-10-20 VITALS
OXYGEN SATURATION: 98 % | RESPIRATION RATE: 18 BRPM | HEART RATE: 75 BPM | DIASTOLIC BLOOD PRESSURE: 85 MMHG | SYSTOLIC BLOOD PRESSURE: 142 MMHG | TEMPERATURE: 98 F

## 2020-10-20 RX ADMIN — Medication 81 MILLIGRAM(S): at 11:50

## 2020-10-20 RX ADMIN — HEPARIN SODIUM 5000 UNIT(S): 5000 INJECTION INTRAVENOUS; SUBCUTANEOUS at 15:27

## 2020-10-20 RX ADMIN — HEPARIN SODIUM 5000 UNIT(S): 5000 INJECTION INTRAVENOUS; SUBCUTANEOUS at 05:34

## 2020-10-20 NOTE — PROGRESS NOTE ADULT - ASSESSMENT
75 y.o. male history of CAD with a stent not currently on blood thinners sent in from his PCP for abnormal labs.  Pt has not been feeling well over the past week or so. c/o general weakness, poor appetite, n/v x1 week. denied fevers, or chills.  Does report decreased PO and decreased BM and urine output. US performed by ED showed significant b/l hydro and urinary retention. Gaston placed drained 500cc urine. seen by  s/p gaston  nephrology consulted for VENUS    VENUS  likely sec to obstructive uropathy  CT with b/l hydro s/p Gaston .  Pt non oliguric   Follow up Urology  Renal function fluctuating   sonogram 10/9 with persistent severe hydronephrosis.   Follow up URology and IR  Per Urology Persistent hydro likely structural in the setting of chronic obstruction  Pt will need further work up for bladder obstruction. f/u urology    If  patient with worsening  uremic symptoms patient would require HD. No plan for HD at present given improving renal function   Consent for hd obtained in chart, risk and benefit explained to patient. All questions answered.     B/l hydronephrosis   CT with b/l hydro s/p Gaston .  Pt non oliguric   Follow up Urology    Proteinuria  in setting of obstruction/ gaston  Repeat UA  Monitor at present

## 2020-10-20 NOTE — PROGRESS NOTE ADULT - REASON FOR ADMISSION
Patient was sent by his pmd for abnormal labs
Yes

## 2020-10-20 NOTE — PROGRESS NOTE ADULT - SUBJECTIVE AND OBJECTIVE BOX
Cardiovascular Disease Progress Note    Overnight events: No acute events overnight. Patient denies chest pain or SOB.    Otherwise review of systems negative    Objective Findings:  T(C): 36.8 (10-20-20 @ 04:45), Max: 36.9 (10-19-20 @ 08:19)  HR: 85 (10-20-20 @ 04:45) (74 - 90)  BP: 116/75 (10-20-20 @ 04:45) (116/73 - 157/90)  RR: 18 (10-20-20 @ 04:45) (18 - 18)  SpO2: 96% (10-20-20 @ 04:45) (96% - 97%)  Wt(kg): --  Daily     Daily       Physical Exam:  Gen: NAD; Patient resting comfortably  HEENT: EOMI, Normocephalic/ atraumatic  CV: RRR, normal S1 + S2, no m/r/g  Lungs:  Normal respiratory effort; clear to auscultation bilaterally  Abd: soft, non-tender; bowel sounds present  Ext: No edema; warm and well perfused    Telemetry: n/a    Laboratory Data:    10-19    137  |  108  |  59<H>  ----------------------------<  91  4.5   |  19<L>  |  4.22<H>    Ca    8.7      19 Oct 2020 06:33                Inpatient Medications:  MEDICATIONS  (STANDING):  aspirin  chewable 81 milliGRAM(s) Oral daily  finasteride 5 milliGRAM(s) Oral daily  heparin   Injectable 5000 Unit(s) SubCutaneous every 8 hours  influenza   Vaccine 0.5 milliLiter(s) IntraMuscular once  polyethylene glycol 3350 17 Gram(s) Oral daily  senna 2 Tablet(s) Oral at bedtime  tamsulosin 0.4 milliGRAM(s) Oral at bedtime      Assessment: 75 year old man with CAD s/p PCI several years ago, HTN, and BPH presents with obstructive VENUS and a-fib with RVR.     Plan of Care:    #A-fib with RVR-  Driven by obstructive uropathy and profound renal injury in the ED.  No further a-fib was noted after Mr. Raymundo was admitted.   Given the transient nature of his a-fib, I would hold off on anticoagulation.   Echo reviewed- TR with mild pHTN noted.     #HTN-  BP now better controlled after stopping IVF.     #Elevated troponins-  Secondary to reduced creatinine clearance.  No plan for coronary ischemic work up at this time given VENUS.    #CAD s/p PCI-  PCI several years ago.   Continue ASA.    Over 25 minutes spent on total encounter; more than 50% of the visit was spent counseling and/or coordinating care by the attending physician.      Lionel Miller MD [facility]  Cardiovascular Disease  [phone]

## 2020-10-20 NOTE — PROGRESS NOTE ADULT - SUBJECTIVE AND OBJECTIVE BOX
[facility]   MD GABO CAMEJO MD RUORU WONG, PA    TEL:  OFFICE: [phone]  DR KIM CELL: [phone]  JEROME DSOUZA CELL: [phone]  DR. SINGH CELL: [phone]  DR. JACOB CELL: [phone]    FROM 5 PM - 7 AM PLEASE CALL ANSWERING SERVICE: [phone]    RENAL FOLLOW UP NOTE--Date of Service 10-20-20 @ 11:56  --------------------------------------------------------------------------------  HPI:      Pt seen and examined at bedside.   Denies SOB, chest pain     PAST HISTORY  --------------------------------------------------------------------------------  No significant changes to PMH, PSH, FHx, SHx, unless otherwise noted    ALLERGIES & MEDICATIONS  --------------------------------------------------------------------------------  Allergies    penicillin (Rash)    Intolerances      Standing Inpatient Medications  aspirin  chewable 81 milliGRAM(s) Oral daily  finasteride 5 milliGRAM(s) Oral daily  heparin   Injectable 5000 Unit(s) SubCutaneous every 8 hours  influenza   Vaccine 0.5 milliLiter(s) IntraMuscular once  polyethylene glycol 3350 17 Gram(s) Oral daily  senna 2 Tablet(s) Oral at bedtime  tamsulosin 0.4 milliGRAM(s) Oral at bedtime    PRN Inpatient Medications      REVIEW OF SYSTEMS  --------------------------------------------------------------------------------  General: no fever  CVS: no chest pain  RESP: no sob, no cough  MSK: no edema     VITALS/PHYSICAL EXAM  --------------------------------------------------------------------------------  T(C): 36.8 (10-20-20 @ 04:45), Max: 36.9 (10-19-20 @ 21:15)  HR: 95 (10-20-20 @ 11:06) (74 - 95)  BP: 142/93 (10-20-20 @ 11:06) (116/73 - 157/90)  RR: 18 (10-20-20 @ 11:06) (18 - 18)  SpO2: 96% (10-20-20 @ 11:06) (96% - 97%)  Wt(kg): --        10-19-20 @ 07:01  -  10-20-20 @ 07:00  --------------------------------------------------------  IN: 700 mL / OUT: 2850 mL / NET: -2150 mL    10-20-20 @ 07:01  -  10-20-20 @ 11:56  --------------------------------------------------------  IN: 120 mL / OUT: 450 mL / NET: -330 mL      Physical Exam:  	Gen: NAD  	HEENT: MMM  	Pulm: CTA B/L  	CV: S1S2  	Abd: Soft, +BS  	Ext: No LE edema B/L                      Neuro: Awake   	Skin: Warm and Dry   	Vascular access: no hd catheter          HEMALATHA gaston  LABS/STUDIES  --------------------------------------------------------------------------------    137  |  108  |  59  ----------------------------<  91      [10-19-20 @ 06:33]  4.5   |  19  |  4.22        Ca     8.7     [10-19-20 @ 06:33]            Creatinine Trend:  SCr 4.22 [10-19 @ 06:33]  SCr 4.11 [10-18 @ 07:16]  SCr 4.18 [10-17 @ 17:30]  SCr 4.49 [10-17 @ 07:07]  SCr 4.35 [10-16 @ 22:38]    Urinalysis - [10-11-20 @ 18:37]      Color Light Yellow / Appearance Slightly Turbid / SG 1.011 / pH 8.0      Gluc Trace / Ketone Negative  / Bili Negative / Urobili Negative       Blood Small / Protein 30 mg/dL / Leuk Est Large / Nitrite Negative      RBC 23 / WBC 82 / Hyaline 5 / Gran  / Sq Epi  / Non Sq Epi 0 / Bacteria Few      Iron 38, TIBC 205, %sat 19      [10-11-20 @ 09:26]  Ferritin 601      [10-11-20 @ 09:26]  TSH 1.01      [10-08-20 @ 02:55]    HCV 0.13, Nonreact      [10-08-20 @ 10:59]

## 2020-10-22 ENCOUNTER — INPATIENT (INPATIENT)
Facility: HOSPITAL | Age: 75
LOS: 13 days | Discharge: HOME HEALTH | End: 2020-11-05
Attending: INTERNAL MEDICINE | Admitting: INTERNAL MEDICINE
Payer: MEDICARE

## 2020-10-22 VITALS
TEMPERATURE: 98 F | OXYGEN SATURATION: 98 % | HEART RATE: 98 BPM | RESPIRATION RATE: 16 BRPM | DIASTOLIC BLOOD PRESSURE: 86 MMHG | HEIGHT: 70 IN | SYSTOLIC BLOOD PRESSURE: 120 MMHG

## 2020-10-22 DIAGNOSIS — A41.9 SEPSIS, UNSPECIFIED ORGANISM: ICD-10-CM

## 2020-10-22 PROBLEM — I25.10 ATHEROSCLEROTIC HEART DISEASE OF NATIVE CORONARY ARTERY WITHOUT ANGINA PECTORIS: Chronic | Status: ACTIVE | Noted: 2020-10-07

## 2020-10-22 PROBLEM — E78.5 HYPERLIPIDEMIA, UNSPECIFIED: Chronic | Status: ACTIVE | Noted: 2020-10-07

## 2020-10-22 PROBLEM — I10 ESSENTIAL (PRIMARY) HYPERTENSION: Chronic | Status: ACTIVE | Noted: 2020-10-07

## 2020-10-22 LAB
ALBUMIN SERPL ELPH-MCNC: 4.1 G/DL — SIGNIFICANT CHANGE UP (ref 3.3–5)
ALP SERPL-CCNC: 65 U/L — SIGNIFICANT CHANGE UP (ref 40–120)
ALT FLD-CCNC: 41 U/L — SIGNIFICANT CHANGE UP (ref 4–41)
ANION GAP SERPL CALC-SCNC: 14 MMO/L — SIGNIFICANT CHANGE UP (ref 7–14)
APPEARANCE UR: SIGNIFICANT CHANGE UP
APTT BLD: 35.5 SEC — SIGNIFICANT CHANGE UP (ref 27–36.3)
AST SERPL-CCNC: 16 U/L — SIGNIFICANT CHANGE UP (ref 4–40)
BACTERIA # UR AUTO: SIGNIFICANT CHANGE UP
BASE EXCESS BLDV CALC-SCNC: -6 MMOL/L — SIGNIFICANT CHANGE UP
BASOPHILS # BLD AUTO: 0.05 K/UL — SIGNIFICANT CHANGE UP (ref 0–0.2)
BASOPHILS NFR BLD AUTO: 0.4 % — SIGNIFICANT CHANGE UP (ref 0–2)
BILIRUB SERPL-MCNC: 0.5 MG/DL — SIGNIFICANT CHANGE UP (ref 0.2–1.2)
BILIRUB UR-MCNC: NEGATIVE — SIGNIFICANT CHANGE UP
BLOOD GAS VENOUS - CREATININE: 5.38 MG/DL — HIGH (ref 0.5–1.3)
BLOOD GAS VENOUS - FIO2: 21 — SIGNIFICANT CHANGE UP
BLOOD UR QL VISUAL: SIGNIFICANT CHANGE UP
BUN SERPL-MCNC: 72 MG/DL — HIGH (ref 7–23)
CALCIUM SERPL-MCNC: 9.6 MG/DL — SIGNIFICANT CHANGE UP (ref 8.4–10.5)
CHLORIDE BLDV-SCNC: 107 MMOL/L — SIGNIFICANT CHANGE UP (ref 96–108)
CHLORIDE SERPL-SCNC: 105 MMOL/L — SIGNIFICANT CHANGE UP (ref 98–107)
CO2 SERPL-SCNC: 17 MMOL/L — LOW (ref 22–31)
COLOR SPEC: YELLOW — SIGNIFICANT CHANGE UP
CREAT SERPL-MCNC: 4.83 MG/DL — HIGH (ref 0.5–1.3)
EOSINOPHIL # BLD AUTO: 0.14 K/UL — SIGNIFICANT CHANGE UP (ref 0–0.5)
EOSINOPHIL NFR BLD AUTO: 1.2 % — SIGNIFICANT CHANGE UP (ref 0–6)
GAS PNL BLDV: 138 MMOL/L — SIGNIFICANT CHANGE UP (ref 136–146)
GLUCOSE BLDV-MCNC: 119 MG/DL — HIGH (ref 70–99)
GLUCOSE SERPL-MCNC: 119 MG/DL — HIGH (ref 70–99)
GLUCOSE UR-MCNC: NEGATIVE — SIGNIFICANT CHANGE UP
HCO3 BLDV-SCNC: 18 MMOL/L — LOW (ref 20–27)
HCT VFR BLD CALC: 30 % — LOW (ref 39–50)
HCT VFR BLDV CALC: 30.1 % — LOW (ref 39–51)
HGB BLD-MCNC: 9.1 G/DL — LOW (ref 13–17)
HGB BLDV-MCNC: 9.7 G/DL — LOW (ref 13–17)
IMM GRANULOCYTES NFR BLD AUTO: 0.8 % — SIGNIFICANT CHANGE UP (ref 0–1.5)
INR BLD: 1.04 — SIGNIFICANT CHANGE UP (ref 0.88–1.16)
KETONES UR-MCNC: NEGATIVE — SIGNIFICANT CHANGE UP
LACTATE BLDV-MCNC: 1.1 MMOL/L — SIGNIFICANT CHANGE UP (ref 0.5–2)
LEUKOCYTE ESTERASE UR-ACNC: SIGNIFICANT CHANGE UP
LYMPHOCYTES # BLD AUTO: 0.37 K/UL — LOW (ref 1–3.3)
LYMPHOCYTES # BLD AUTO: 3.2 % — LOW (ref 13–44)
MCHC RBC-ENTMCNC: 28.5 PG — SIGNIFICANT CHANGE UP (ref 27–34)
MCHC RBC-ENTMCNC: 30.3 % — LOW (ref 32–36)
MCV RBC AUTO: 94 FL — SIGNIFICANT CHANGE UP (ref 80–100)
MONOCYTES # BLD AUTO: 0.72 K/UL — SIGNIFICANT CHANGE UP (ref 0–0.9)
MONOCYTES NFR BLD AUTO: 6.2 % — SIGNIFICANT CHANGE UP (ref 2–14)
NEUTROPHILS # BLD AUTO: 10.3 K/UL — HIGH (ref 1.8–7.4)
NEUTROPHILS NFR BLD AUTO: 88.2 % — HIGH (ref 43–77)
NITRITE UR-MCNC: POSITIVE — HIGH
NRBC # FLD: 0 K/UL — SIGNIFICANT CHANGE UP (ref 0–0)
PCO2 BLDV: 42 MMHG — SIGNIFICANT CHANGE UP (ref 41–51)
PH BLDV: 7.29 PH — LOW (ref 7.32–7.43)
PH UR: 5.5 — SIGNIFICANT CHANGE UP (ref 5–8)
PLATELET # BLD AUTO: 214 K/UL — SIGNIFICANT CHANGE UP (ref 150–400)
PMV BLD: 10.9 FL — SIGNIFICANT CHANGE UP (ref 7–13)
PO2 BLDV: < 24 MMHG — LOW (ref 35–40)
POTASSIUM BLDV-SCNC: 4.8 MMOL/L — HIGH (ref 3.4–4.5)
POTASSIUM SERPL-MCNC: 4.8 MMOL/L — SIGNIFICANT CHANGE UP (ref 3.5–5.3)
POTASSIUM SERPL-SCNC: 4.8 MMOL/L — SIGNIFICANT CHANGE UP (ref 3.5–5.3)
PROT SERPL-MCNC: 7.6 G/DL — SIGNIFICANT CHANGE UP (ref 6–8.3)
PROT UR-MCNC: 100 — HIGH
PROTHROM AB SERPL-ACNC: 11.9 SEC — SIGNIFICANT CHANGE UP (ref 10.6–13.6)
RBC # BLD: 3.19 M/UL — LOW (ref 4.2–5.8)
RBC # FLD: 12.8 % — SIGNIFICANT CHANGE UP (ref 10.3–14.5)
RBC CASTS # UR COMP ASSIST: >50 — HIGH (ref 0–?)
SAO2 % BLDV: 16.7 % — LOW (ref 60–85)
SARS-COV-2 RNA SPEC QL NAA+PROBE: SIGNIFICANT CHANGE UP
SODIUM SERPL-SCNC: 136 MMOL/L — SIGNIFICANT CHANGE UP (ref 135–145)
SP GR SPEC: 1.01 — SIGNIFICANT CHANGE UP (ref 1–1.04)
SQUAMOUS # UR AUTO: SIGNIFICANT CHANGE UP
UROBILINOGEN FLD QL: NORMAL — SIGNIFICANT CHANGE UP
WBC # BLD: 11.67 K/UL — HIGH (ref 3.8–10.5)
WBC # FLD AUTO: 11.67 K/UL — HIGH (ref 3.8–10.5)
WBC UR QL: >50 — HIGH (ref 0–?)

## 2020-10-22 PROCEDURE — 71045 X-RAY EXAM CHEST 1 VIEW: CPT | Mod: 26

## 2020-10-22 PROCEDURE — 99285 EMERGENCY DEPT VISIT HI MDM: CPT

## 2020-10-22 RX ORDER — TAMSULOSIN HYDROCHLORIDE 0.4 MG/1
0.4 CAPSULE ORAL AT BEDTIME
Refills: 0 | Status: DISCONTINUED | OUTPATIENT
Start: 2020-10-22 | End: 2020-11-04

## 2020-10-22 RX ORDER — FINASTERIDE 5 MG/1
5 TABLET, FILM COATED ORAL DAILY
Refills: 0 | Status: DISCONTINUED | OUTPATIENT
Start: 2020-10-22 | End: 2020-11-05

## 2020-10-22 RX ORDER — SODIUM CHLORIDE 9 MG/ML
1000 INJECTION, SOLUTION INTRAVENOUS
Refills: 0 | Status: DISCONTINUED | OUTPATIENT
Start: 2020-10-22 | End: 2020-10-26

## 2020-10-22 RX ORDER — SODIUM CHLORIDE 9 MG/ML
2000 INJECTION, SOLUTION INTRAVENOUS ONCE
Refills: 0 | Status: COMPLETED | OUTPATIENT
Start: 2020-10-22 | End: 2020-10-22

## 2020-10-22 RX ORDER — HEPARIN SODIUM 5000 [USP'U]/ML
5000 INJECTION INTRAVENOUS; SUBCUTANEOUS EVERY 8 HOURS
Refills: 0 | Status: DISCONTINUED | OUTPATIENT
Start: 2020-10-22 | End: 2020-10-24

## 2020-10-22 RX ORDER — ACETAMINOPHEN 500 MG
650 TABLET ORAL ONCE
Refills: 0 | Status: COMPLETED | OUTPATIENT
Start: 2020-10-22 | End: 2020-10-22

## 2020-10-22 RX ORDER — ASPIRIN/CALCIUM CARB/MAGNESIUM 324 MG
81 TABLET ORAL DAILY
Refills: 0 | Status: DISCONTINUED | OUTPATIENT
Start: 2020-10-22 | End: 2020-10-25

## 2020-10-22 RX ORDER — CEFTRIAXONE 500 MG/1
1000 INJECTION, POWDER, FOR SOLUTION INTRAMUSCULAR; INTRAVENOUS ONCE
Refills: 0 | Status: COMPLETED | OUTPATIENT
Start: 2020-10-22 | End: 2020-10-22

## 2020-10-22 RX ORDER — TAMSULOSIN HYDROCHLORIDE 0.4 MG/1
0.4 CAPSULE ORAL AT BEDTIME
Refills: 0 | Status: DISCONTINUED | OUTPATIENT
Start: 2020-10-22 | End: 2020-10-22

## 2020-10-22 RX ORDER — CEFTRIAXONE 500 MG/1
1000 INJECTION, POWDER, FOR SOLUTION INTRAMUSCULAR; INTRAVENOUS EVERY 24 HOURS
Refills: 0 | Status: DISCONTINUED | OUTPATIENT
Start: 2020-10-22 | End: 2020-10-23

## 2020-10-22 RX ADMIN — CEFTRIAXONE 100 MILLIGRAM(S): 500 INJECTION, POWDER, FOR SOLUTION INTRAMUSCULAR; INTRAVENOUS at 12:20

## 2020-10-22 RX ADMIN — SODIUM CHLORIDE 100 MILLILITER(S): 9 INJECTION, SOLUTION INTRAVENOUS at 22:13

## 2020-10-22 RX ADMIN — HEPARIN SODIUM 5000 UNIT(S): 5000 INJECTION INTRAVENOUS; SUBCUTANEOUS at 22:14

## 2020-10-22 RX ADMIN — Medication 650 MILLIGRAM(S): at 12:05

## 2020-10-22 RX ADMIN — TAMSULOSIN HYDROCHLORIDE 0.4 MILLIGRAM(S): 0.4 CAPSULE ORAL at 22:13

## 2020-10-22 RX ADMIN — SODIUM CHLORIDE 100 MILLILITER(S): 9 INJECTION, SOLUTION INTRAVENOUS at 18:50

## 2020-10-22 RX ADMIN — SODIUM CHLORIDE 2000 MILLILITER(S): 9 INJECTION, SOLUTION INTRAVENOUS at 12:05

## 2020-10-22 NOTE — ED ADULT TRIAGE NOTE - CHIEF COMPLAINT QUOTE
pt c/o weakness x2 days. discharged from hospital admission recently. arrives with Garcia catheter, with cloudy appearing urine. .

## 2020-10-22 NOTE — H&P ADULT - PROBLEM SELECTOR PLAN 2
Garcia associated UTI / Acute pyelonephritis- Continue with Ceftriaxone. Follow up urine blood cultures.

## 2020-10-22 NOTE — ED PROVIDER NOTE - NS ED ROS FT
General: no fever, +generalized weakness  Head: no headache or lightheadedness  Eyes: no vision change  ENT: no nasal discharge/congestion, no sore throat  CV: no chest pain  Resp: no SOB, no cough  GI: +n/v, no diarrhea, no abdominal pain  : +gaston in place, +cloudy urine, +dysuria, no hematuria  MSK: no joint pain, +intermittent lower back pain  Skin: no new rash  Neuro: no focal weakness, no change in sensation

## 2020-10-22 NOTE — ED PROVIDER NOTE - OBJECTIVE STATEMENT
74yo man PMH CAD on aspirin, HTN with recent admission for urinary retention with VENUS and discharged 2 days ago with a gaston in place presents to ED today with persistent generalized weakness and n/v with cloudy urine output and some discomfort with urine output into gaston. Pt denies any back pain, abdominal pain, diarrhea, chest pain, SOB, cough, headache, neck stiffness.  He was supposed to f/u with urology outpt to have gaston removed but has not yet seen them yet.

## 2020-10-22 NOTE — ED ADULT NURSE NOTE - OBJECTIVE STATEMENT
pt arrives c/o generalized weakness and nausea. was recently DC was admitted d/t VENUS ontop of CKD as well as urianry retention. was sent home with a gaston to leg bag and was supposed to f/u with urology but has not yet. upon assessment pt is alert but weak and nauseous but not vomiting. abd soft non tender pt arrives c/o generalized weakness and nausea. was recently DC was admitted d/t VENUS ontop of CKD as well as urianry retention. was sent home with a gaston to leg bag and was supposed to f/u with urology but has not yet. upon assessment pt is alert but weak and nauseous but not vomiting. abd soft non tender. gaston bag noted to have cloudy urine with sediment. rectally febrile. IV established 20 g RAC labs sent TQ removed. gaston swapped 14 fr via sterile procedure without complication. LR and and abx infusing as ordered.

## 2020-10-22 NOTE — ED PROVIDER NOTE - PHYSICAL EXAMINATION
General: tired appearing elderly man in no acute distress and febrile and tachycardic on initial exam  Head: normocephalic, atraumatic  Eyes: PERRL, EOMI  Mouth: moist mucous membranes, no oropharyngeal erythema  Neck: supple neck, no lymphadenopathy  CV: normal rate and rhythm, no LE edema, peripheral pulses 2+ bilateral UE and LE  Respiratory: clear to auscultation bilaterally  Abdomen: soft, nontender, nondistended  : no CVAT, gaston in place with cloudy urine output  Neuro: alert and oriented x3, speech clear, moving all extremities without difficulty but slowly  Skin: no rash noted, bruising on abdomen 2/2 heparin shots  Extremities: no joint deformities or swelling

## 2020-10-22 NOTE — ED ADULT TRIAGE NOTE - HEIGHT IN INCHES
Infusion Nursing Note:  Amira Arreola presents today for Chemotherapy.    Patient seen by provider today: Yes: Dr Roberts   present during visit today: Not Applicable.    Note: Taking Decadron and Acyclovir as prescribed. Sent in basket message to Dr Roberts regarding pts INR per above note.   Intravenous Access:  Labs drawn without difficulty.  Implanted Port.    Treatment Conditions:  Lab Results   Component Value Date    HGB 12.2 03/28/2018     Lab Results   Component Value Date    WBC 6.2 03/28/2018      Lab Results   Component Value Date    ANEU 5.7 03/28/2018     Lab Results   Component Value Date     03/28/2018      Lab Results   Component Value Date     06/13/2017                   Lab Results   Component Value Date    POTASSIUM 4.0 06/13/2017           No results found for: MAG         Lab Results   Component Value Date    CR 0.53 02/07/2018                   Lab Results   Component Value Date    BRADLEY 9.4 02/07/2018                Lab Results   Component Value Date    BILITOTAL 0.5 03/14/2018           Lab Results   Component Value Date    ALBUMIN 3.4 03/14/2018                    Lab Results   Component Value Date    ALT 33 03/14/2018           Lab Results   Component Value Date    AST 32 03/14/2018       Results reviewed, labs MET treatment parameters, ok to proceed with treatment.      Post Infusion Assessment:  Patient tolerated injection without incident.  Blood return noted pre and post infusion.  Site patent and intact, free from redness, edema or discomfort.  No evidence of extravasations.  Access discontinued per protocol.    Discharge Plan:   Discharge instructions reviewed with: Patient and Family.  Patient and/or family verbalized understanding of discharge instructions and all questions answered.  Copy of AVS reviewed with patient and/or family.  Patient will return 4/4/2018 for next appointment.  Patient discharged in stable condition accompanied by: self and  daughter.  Departure Mode: Ambulatory.    Thelma Acosta RN                       10

## 2020-10-22 NOTE — ED PROVIDER NOTE - CLINICAL SUMMARY MEDICAL DECISION MAKING FREE TEXT BOX
74yo man presents with fever and tachycardia with generalized weakness in setting of recent gaston catheter placement for urinary retention with cloudy urine in gaston with no other focal abnormalities on exam. Likely urosepsis. Will send blood work, culture, UA, urine culture, replace gaston catheter, cxr, ekg, start on abx and fluids and admit for further monitoring and management.

## 2020-10-22 NOTE — H&P ADULT - PROBLEM SELECTOR PLAN 1
Gaston associated UTI / Acute pyelonephritis- Continue with Ceftriaxone. Follow up urine blood cultures. Urology consult. Renal consult called. Follow up recs.   Bilateral moderate hydroureteronephrosis- last sono, post obstructive Uropathy.    Continue with gaston, Flomax. Continue with Finasteride.

## 2020-10-22 NOTE — H&P ADULT - HISTORY OF PRESENT ILLNESS
74yo M with pmhx cad, htn, bph well known to me admitted to my service at [facility] for  VENUS and urine retention and was discharged home 2 days ago with gaston in place p/w nausea, vomiting cloudy urine.   Pt denies any back pain, abdominal pain, diarrhea, chest pain, SOB, cough, headache, neck stiffness.    In the ed patient noted to have positive UA, temp 101.4, patient received Ceftriaxone x 1 dose.

## 2020-10-22 NOTE — ED ADULT NURSE REASSESSMENT NOTE - NS ED NURSE REASSESS COMMENT FT1
pt well appearing reports feeling much better now that fever is controlled. vitally stable, resting in no distress

## 2020-10-22 NOTE — H&P ADULT - PROBLEM SELECTOR PLAN 3
On CKD- Creatinine stable around 4. Monitor for now.   Continue with gentle hydration.   Renal consult called, follow up recs.

## 2020-10-22 NOTE — ED PROVIDER NOTE - ATTENDING CONTRIBUTION TO CARE
74 yo M past medical history cad, htn, recent admission for urinary retention and gonzalo presents for generalized weakness, nausea, weakness, cloudy urine. Pt found to be febrile. exam as above. presentation concerning for infection, suspicious for uti. plan: labs, cxr, reassess.

## 2020-10-23 DIAGNOSIS — A41.9 SEPSIS, UNSPECIFIED ORGANISM: ICD-10-CM

## 2020-10-23 DIAGNOSIS — N10 ACUTE PYELONEPHRITIS: ICD-10-CM

## 2020-10-23 DIAGNOSIS — I10 ESSENTIAL (PRIMARY) HYPERTENSION: ICD-10-CM

## 2020-10-23 DIAGNOSIS — N17.9 ACUTE KIDNEY FAILURE, UNSPECIFIED: ICD-10-CM

## 2020-10-23 DIAGNOSIS — I25.10 ATHEROSCLEROTIC HEART DISEASE OF NATIVE CORONARY ARTERY WITHOUT ANGINA PECTORIS: ICD-10-CM

## 2020-10-23 DIAGNOSIS — N40.1 BENIGN PROSTATIC HYPERPLASIA WITH LOWER URINARY TRACT SYMPTOMS: ICD-10-CM

## 2020-10-23 LAB
APPEARANCE UR: SIGNIFICANT CHANGE UP
BACTERIA # UR AUTO: SIGNIFICANT CHANGE UP
BILIRUB UR-MCNC: NEGATIVE — SIGNIFICANT CHANGE UP
BLOOD UR QL VISUAL: HIGH
COLOR SPEC: COLORLESS — SIGNIFICANT CHANGE UP
CREAT ?TM UR-MCNC: 55.1 MG/DL — SIGNIFICANT CHANGE UP
CULTURE RESULTS: SIGNIFICANT CHANGE UP
CULTURE RESULTS: SIGNIFICANT CHANGE UP
GLUCOSE UR-MCNC: NEGATIVE — SIGNIFICANT CHANGE UP
HYALINE CASTS # UR AUTO: SIGNIFICANT CHANGE UP
KETONES UR-MCNC: NEGATIVE — SIGNIFICANT CHANGE UP
LEUKOCYTE ESTERASE UR-ACNC: SIGNIFICANT CHANGE UP
NITRITE UR-MCNC: NEGATIVE — SIGNIFICANT CHANGE UP
OSMOLALITY UR: 421 MOSMO/KG — SIGNIFICANT CHANGE UP (ref 50–1200)
PH UR: 6.5 — SIGNIFICANT CHANGE UP (ref 5–8)
PROT UR-MCNC: 70 — SIGNIFICANT CHANGE UP
RBC CASTS # UR COMP ASSIST: >50 — HIGH (ref 0–?)
SP GR SPEC: 1.01 — SIGNIFICANT CHANGE UP (ref 1–1.04)
SPECIMEN SOURCE: SIGNIFICANT CHANGE UP
SPECIMEN SOURCE: SIGNIFICANT CHANGE UP
SQUAMOUS # UR AUTO: SIGNIFICANT CHANGE UP
UROBILINOGEN FLD QL: NORMAL — SIGNIFICANT CHANGE UP
WBC UR QL: HIGH (ref 0–?)

## 2020-10-23 PROCEDURE — 99223 1ST HOSP IP/OBS HIGH 75: CPT

## 2020-10-23 PROCEDURE — 93010 ELECTROCARDIOGRAM REPORT: CPT

## 2020-10-23 RX ORDER — ONDANSETRON 8 MG/1
4 TABLET, FILM COATED ORAL EVERY 6 HOURS
Refills: 0 | Status: DISCONTINUED | OUTPATIENT
Start: 2020-10-23 | End: 2020-11-05

## 2020-10-23 RX ORDER — VANCOMYCIN HCL 1 G
500 VIAL (EA) INTRAVENOUS ONCE
Refills: 0 | Status: COMPLETED | OUTPATIENT
Start: 2020-10-23 | End: 2020-10-23

## 2020-10-23 RX ADMIN — FINASTERIDE 5 MILLIGRAM(S): 5 TABLET, FILM COATED ORAL at 12:48

## 2020-10-23 RX ADMIN — TAMSULOSIN HYDROCHLORIDE 0.4 MILLIGRAM(S): 0.4 CAPSULE ORAL at 22:28

## 2020-10-23 RX ADMIN — HEPARIN SODIUM 5000 UNIT(S): 5000 INJECTION INTRAVENOUS; SUBCUTANEOUS at 06:07

## 2020-10-23 RX ADMIN — CEFTRIAXONE 100 MILLIGRAM(S): 500 INJECTION, POWDER, FOR SOLUTION INTRAMUSCULAR; INTRAVENOUS at 13:24

## 2020-10-23 RX ADMIN — HEPARIN SODIUM 5000 UNIT(S): 5000 INJECTION INTRAVENOUS; SUBCUTANEOUS at 17:38

## 2020-10-23 RX ADMIN — Medication 100 MILLIGRAM(S): at 22:28

## 2020-10-23 RX ADMIN — Medication 81 MILLIGRAM(S): at 12:48

## 2020-10-23 RX ADMIN — SODIUM CHLORIDE 100 MILLILITER(S): 9 INJECTION, SOLUTION INTRAVENOUS at 08:15

## 2020-10-23 NOTE — CONSULT NOTE ADULT - SUBJECTIVE AND OBJECTIVE BOX
74yo M, recent admission to [facility] for VENUS.  Patient found to have severe b/l hydro, Garcia was placed, Cr 9->4.1.  Repeat ultrasound demonstrated improved but persistent b/l hydro.      Presented to [facility] ER with fevers, UCx pending, BCx NGTD.  Most recent Cr 4.8.    PAST MEDICAL & SURGICAL HISTORY:  HLD (hyperlipidemia)  CAD (coronary artery disease)  HTN (hypertension)    MEDICATIONS  (STANDING):  aspirin  chewable 81 milliGRAM(s) Oral daily  cefTRIAXone   IVPB 1000 milliGRAM(s) IV Intermittent every 24 hours  finasteride 5 milliGRAM(s) Oral daily  heparin   Injectable 5000 Unit(s) SubCutaneous every 8 hours  lactated ringers. 1000 milliLiter(s) (100 mL/Hr) IV Continuous <Continuous>  tamsulosin 0.4 milliGRAM(s) Oral at bedtime    MEDICATIONS  (PRN):  ondansetron Injectable 4 milliGRAM(s) IV Push every 6 hours PRN Nausea and/or Vomiting    FAMILY HISTORY:  No pertinent family history in first degree relatives    Allergies  penicillin (Rash)    REVIEW OF SYSTEMS: Pertinent positives and negatives as stated in HPI, otherwise negative    Vital signs  T(C): 37.3, Max: 37.3 (10-23 @ 14:23)  HR: 80  BP: 112/60  SpO2: 99%    Physical Exam  Gen: NAD  Abd: Soft, NT, ND  : Garcia in place draining clear yellow urine    LABS:  10-22 @ 12:00  WBC 11.67 / Hct 30.0  / SCr 4.83     10-22  136  |  105  |  72<H>  ----------------------------<  119<H>  4.8   |  17<L>  |  4.83<H>    Ca    9.6      22 Oct 2020 12:00    TPro  7.6  /  Alb  4.1  /  TBili  0.5  /  DBili  x   /  AST  16  /  ALT  41  /  AlkPhos  65  10-22    PT/INR - ( 22 Oct 2020 12:00 )   PT: 11.9 SEC;   INR: 1.04     PTT - ( 22 Oct 2020 12:00 )  PTT:35.5 SEC    Urinalysis Basic - ( 22 Oct 2020 12:10   Color: YELLOW / Appearance: TURBID / S.012 / pH: 5.5  Gluc: NEGATIVE / Ketone: NEGATIVE  / Bili: NEGATIVE / Urobili: NORMAL   Blood: LARGE / Protein: 100 / Nitrite: POSITIVE   Leuk Esterase: LARGE / RBC: >50 / WBC >50   Sq Epi: FEW / Non Sq Epi: x / Bacteria: LARGE    Urine Cx: p  Blood Cx: NGTD

## 2020-10-23 NOTE — CONSULT NOTE ADULT - ASSESSMENT
76yo M with pmhx cad, htn, bph, recent admission to [facility] for VENUS severe b/l hydro s/p gaston and was discharged home 2 days ago with gaston in place p/w nausea, vomiting cloudy urine and weakness  nephrology consulted for renal failure    Renal failure  recent VENUS sec to obstructive uropathy, scr peaked >9 and had improved and stable ~4.1 upon discharge  now patient with worsen scr pending today's bmp  ? baseline vs VENUS sec to prerenal vs obstruction   Gaston in place and is non oliguric  repeat kidney US to assess hydronephrosis. consider  follow up  check urine cr, na  agree with current IVF  monitor bmp, urine output  avoid nephrotoxic agents  renal dose medication     Proteinuria/hematuria  in setting of obstruction/ gaston  Repeat UA  Monitor at present    anemia  monitor  check iron panel  transfuse per team   76yo M with pmhx cad, htn, bph, recent admission to [facility] for VENUS severe b/l hydro s/p gaston and was discharged home 2 days ago with gaston in place p/w nausea, vomiting cloudy urine and weakness  nephrology consulted for renal failure    Renal failure  recent VENUS sec to obstructive uropathy, scr peaked >9 and had improved and stable ~4.1 upon discharge  now patient with worsen scr pending today's bmp  ? baseline vs VENUS sec to prerenal vs obstruction   Gaston in place and is non oliguric  repeat kidney US to assess hydronephrosis. consider  follow up  check urine cr, na  agree with current IVF  monitor bmp, urine output  avoid nephrotoxic agents  renal dose medication     Proteinuria/hematuria  in setting of obstruction/ gaston  Repeat UA  Monitor at present    Anemia  monitor  check iron panel  transfuse per team

## 2020-10-23 NOTE — CONSULT NOTE ADULT - SUBJECTIVE AND OBJECTIVE BOX
Staci Mauricio - [phone]  Patient is a 75y old  Male who presents with a chief complaint of weakness  HPI:  75M with cad/stent, htn, bph.  Recently hospitalized 10/7/2020 - 10/20/2020 for VENUS on CKD due to obstructive uropathy.  He had also changed his ANTONETTE medications from flomax to ditropan.  During hospitalization, imaging showed obstruction, gaston placed, drained 500cc, CT with bladder stone.  He had a low grade temp, BC negative and he was discharged.  Returns 2 days later with n/v, cloudy urine.  Isolated fever 101.4, mild leukocytosis, pyuria (in setting of chronic gaston).  Started on ceftriaxone and ID asked to help managment.  prior hospital charts reviewed [ x ] primary team notes reviewed [ x ] other consultant notes reviewed [ x ]  PAST MEDICAL & SURGICAL HISTORY: HLD (hyperlipidemia) CAD (coronary artery disease) HTN (hypertension) BPH CKD  Allergies penicillin (Rash)  ANTIMICROBIALS: cefTRIAXone   IVPB 1000 every 24 hours (10/22-)  OTHER MEDS: MEDICATIONS  (STANDING): aspirin  chewable 81 daily finasteride 5 daily heparin   Injectable 5000 every 8 hours ondansetron Injectable 4 every 6 hours PRN tamsulosin 0.4 at bedtime  SOCIAL HISTORY:  former smoker  FAMILY HISTORY:  REVIEW OF SYSTEMS [  ] ROS unobtainable because:   [  ] All other systems negative except as noted below:	  Constitutional:  [ ] fever [ ] chills  [ ] weight loss  [ ] weakness Skin:  [ ] rash [ ] phlebitis	 Eyes: [ ] icterus [ ] pain  [ ] discharge	 ENMT: [ ] sore throat  [ ] thrush [ ] ulcers [ ] exudates Respiratory: [ ] dyspnea [ ] hemoptysis [ ] cough [ ] sputum	 Cardiovascular:  [ ] chest pain [ ] palpitations [ ] edema	 Gastrointestinal:  [ ] nausea [ ] vomiting [ ] diarrhea [ ] constipation [ ] pain	 Genitourinary:  [ ] dysuria [ ] frequency [ ] hematuria [ ] discharge [ ] flank pain  [ ] incontinence Musculoskeletal:  [ ] myalgias [ ] arthralgias [ ] arthritis  [ ] back pain Neurological:  [ ] headache [ ] seizures  [ ] confusion/altered mental status Psychiatric:  [ ] anxiety [ ] depression	 Hematology/Lymphatics:  [ ] lymphadenopathy Endocrine:  [ ] adrenal [ ] thyroid Allergic/Immunologic:	 [ ] transplant [ ] seasonal  Vital Signs Last 24 Hrs T(F): 99.1 (10-23-20 @ 14:23), Max: 101.4 (10-22-20 @ 11:45) Vital Signs Last 24 Hrs HR: 80 (10-23-20 @ 14:23) (73 - 86) BP: 112/60 (10-23-20 @ 14:23) (109/60 - 147/75) RR: 17 (10-23-20 @ 14:23) SpO2: 99% (10-23-20 @ 14:23) (98% - 100%) Wt(kg): --  PHYSICAL EXAM:                         9.1   11.67 )-----------( 214      ( 22 Oct 2020 12:00 )            30.0   136  |  105  |  72<H> ----------------------------<  119<H> 4.8   |  17<L>  |  4.83<H>  Ca    9.6      22 Oct 2020 12:00  TPro  7.6  /  Alb  4.1  /  TBili  0.5  /  DBili  x   /  AST  16  /  ALT  41  /  AlkPhos  65  10  Urinalysis Basic - ( 22 Oct 2020 12:10 ) Color: YELLOW / Appearance: TURBID / S.012 / pH: 5.5 Gluc: NEGATIVE / Ketone: NEGATIVE  / Bili: NEGATIVE / Urobili: NORMAL  Blood: LARGE / Protein: 100 / Nitrite: POSITIVE  Leuk Esterase: LARGE / RBC: >50 / WBC >50  Sq Epi: FEW / Non Sq Epi: x / Bacteria: LARGE  MICROBIOLOGY:  pending  Culture - Blood (collected 10-22-20 @ 14:10) Source: .Blood Blood-Venous Preliminary Report (10-23-20 @ 15:01):   No growth to date.  Culture - Blood (collected 10-22-20 @ 14:10) Source: .Blood Blood-Peripheral Preliminary Report (10-23-20 @ 15:01):   No growth to date.  COVID-19 PCR: NotDetec (10-22-20 @ 12:25)  Rapid RVP Result: NotDetec (10-18-20 @ 12:02)  Culture - Blood (collected 10-18-20 @ 14:08) Source: .Blood Blood-Peripheral Final Report (10-23-20 @ 15:00):   No Growth Final  Culture - Blood (collected 10-18-20 @ 14:08) Source: .Blood Blood-Peripheral Final Report (10-23-20 @ 15:):   No Growth Final  Rapid RVP Result: NotDetec (10-18 @ 12:02)  Culture - Urine (collected 10-07-20 @ 17:49) Source: .Urine Catheterized Final Report (10-08-20 @ 17:00):   No growth  COVID-19 IgG Antibody Interpretation: Negative (10-08-20 @ 05:12) COVID-19 PCR: NotDetec (10-07-20 @ 13:02)    RADIOLOGY: imaging below personally reviewed and agree with findings  Xray Chest 1 View AP/PA (10.22.20 @ 14:36) > IMPRESSION:  No focal consolidation to indicate pneumonia.  US Kidney and Bladder (10.15.20 @ 15:44) > IMPRESSION:  Moderate bilateral hydronephrosis demonstrating mild decrease in degree compared to prior study.  Bilateral renal parenchymal disease.  Prostatomegaly.  US Kidney and Bladder (10.11.20 @ 22:36) > IMPRESSION:  Unchanged bilateral severe hydronephroureter.  Hypertrophied prostate. Central gland protruding into the bladder with thickening of the bladder wall consistent with some degree of bladder outlet obstruction.  Bladder calculus.  CT Abdomen and Pelvis No Cont (10.07.20 @ 14:13) > IMPRESSION:  Bilateral moderate to severe hydroureteronephrosis. No evidence of ureteral obstruction.  Enlarged prostate with chronic changes of bladder outlet obstruction. Bladder stone and Gaston catheter in the bladder. Staci Mauricio - [phone]  Patient is a 75y old  Male who presents with a chief complaint of weakness  HPI:  75M with cad/stent, htn, bph.  Recently hospitalized 10/7/2020 - 10/20/2020 for VENUS on CKD due to obstructive uropathy.  He had also changed his ANTONETTE medications from flomax to ditropan.  During hospitalization, imaging showed obstruction, gaston placed, drained 500cc, CT with bladder stone.  He had a low grade temp, BC negative and he was discharged.  Returns 2 days later with n/v, cloudy urine.  Isolated fever 101.4, mild leukocytosis, pyuria (in setting of chronic gaston).  Started on ceftriaxone.  Gaston was changed in the ER and again today as it was leaking.    ID asked to help management.  prior hospital charts reviewed [ x ] primary team notes reviewed [ x ] other consultant notes reviewed [ x ]  PAST MEDICAL & SURGICAL HISTORY: HLD (hyperlipidemia) CAD (coronary artery disease) HTN (hypertension) BPH CKD  Allergies penicillin (Rash)  ANTIMICROBIALS: cefTRIAXone   IVPB 1000 every 24 hours (10/22-)  OTHER MEDS: MEDICATIONS  (STANDING): aspirin  chewable 81 daily finasteride 5 daily heparin   Injectable 5000 every 8 hours ondansetron Injectable 4 every 6 hours PRN tamsulosin 0.4 at bedtime  SOCIAL HISTORY:  former smoker, born in [place]  FAMILY HISTORY:  mother also had kidney stones,  age 98  REVIEW OF SYSTEMS [  ] ROS unobtainable because:   [ x ] All other systems negative except as noted below:	  Constitutional:  [x ] fever [ ] chills  [ ] weight loss  [x ] weakness Skin:  [ ] rash [ ] phlebitis	 Eyes: [ ] icterus [ ] pain  [ ] discharge	 ENMT: [ ] sore throat  [ ] thrush [ ] ulcers [ ] exudates Respiratory: [ ] dyspnea [ ] hemoptysis [ ] cough [ ] sputum	 Cardiovascular:  [ ] chest pain [ ] palpitations [ ] edema	 Gastrointestinal:  [x ] nausea [ x] vomiting [ ] diarrhea [ ] constipation [ ] pain	 Genitourinary:  [ ] dysuria [ ] frequency [ ] hematuria [ ] discharge [ ] flank pain  [ ] incontinence Musculoskeletal:  [ ] myalgias [ ] arthralgias [ ] arthritis  [ ] back pain Neurological:  [ ] headache [ ] seizures  [ ] confusion/altered mental status Psychiatric:  [ ] anxiety [ ] depression	 Hematology/Lymphatics:  [ ] lymphadenopathy Endocrine:  [ ] adrenal [ ] thyroid Allergic/Immunologic:	 [ ] transplant [ ] seasonal  Vital Signs Last 24 Hrs T(F): 99.1 (10-23-20 @ 14:23), Max: 101.4 (10-22-20 @ 11:45) Vital Signs Last 24 Hrs HR: 80 (10-23-20 @ 14:23) (73 - 86) BP: 112/60 (10-23-20 @ 14:23) (109/60 - 147/75) RR: 17 (10-23-20 @ 14:23) SpO2: 99% (10-23-20 @ 14:23) (98% - 100%) Wt(kg): --  PHYSICAL EXAM: Constitutional: non-toxic HEAD/EYES: anicteric ENT:  supple Cardiovascular:   normal S1, S2 Respiratory:  clear BS bilaterally GI:  soft, non-tender, normal bowel sounds :  gaston with slightly cloudy urine Musculoskeletal:  no synovitis Neurologic: awake and alert, normal strength, no focal findings Skin:  no rash, no erythema, no phlebitis Psychiatric:  awake, alert, appropriate mood                      9.1   11.67 )-----------( 214      ( 22 Oct 2020 12:00 )            30.0   136  |  105  |  72<H> ----------------------------<  119<H> 4.8   |  17<L>  |  4.83<H>  Ca    9.6      22 Oct 2020 12:00  TPro  7.6  /  Alb  4.1  /  TBili  0.5  /  DBili  x   /  AST  16  /  ALT  41  /  AlkPhos  65  10  Urinalysis Basic - ( 22 Oct 2020 12:10 ) Color: YELLOW / Appearance: TURBID / S.012 / pH: 5.5 Gluc: NEGATIVE / Ketone: NEGATIVE  / Bili: NEGATIVE / Urobili: NORMAL  Blood: LARGE / Protein: 100 / Nitrite: POSITIVE  Leuk Esterase: LARGE / RBC: >50 / WBC >50  Sq Epi: FEW / Non Sq Epi: x / Bacteria: LARGE  MICROBIOLOGY: UC pending  Culture - Blood (collected 10-22-20 @ 14:10) Source: .Blood Blood-Venous Preliminary Report (10-23-20 @ 15:01):   No growth to date.  Culture - Blood (collected 10-22-20 @ 14:10) Source: .Blood Blood-Peripheral Preliminary Report (10-23-20 @ 15:):   No growth to date.  COVID-19 PCR: NotDetec (10-22-20 @ 12:25)  Rapid RVP Result: NotDetec (10-18-20 @ 12:02)  Culture - Blood (collected 10-18-20 @ 14:08) Source: .Blood Blood-Peripheral Final Report (10-23-20 @ 15:00):   No Growth Final  Culture - Blood (collected 10-18-20 @ 14:08) Source: .Blood Blood-Peripheral Final Report (10-23-20 @ 15:):   No Growth Final  Rapid RVP Result: NotDetec (10-18 @ 12:02)  Culture - Urine (collected 10-07-20 @ 17:49) Source: .Urine Catheterized Final Report (10-08-20 @ 17:00):   No growth  COVID-19 IgG Antibody Interpretation: Negative (10-08-20 @ 05:12) COVID-19 PCR: NotDetec (10-07-20 @ 13:02)    RADIOLOGY: imaging below personally reviewed and agree with findings  Xray Chest 1 View AP/PA (10.22.20 @ 14:36) > IMPRESSION:  No focal consolidation to indicate pneumonia.  US Kidney and Bladder (10.15.20 @ 15:44) > IMPRESSION:  Moderate bilateral hydronephrosis demonstrating mild decrease in degree compared to prior study.  Bilateral renal parenchymal disease.  Prostatomegaly.  US Kidney and Bladder (10.11.20 @ 22:36) > IMPRESSION:  Unchanged bilateral severe hydronephroureter.  Hypertrophied prostate. Central gland protruding into the bladder with thickening of the bladder wall consistent with some degree of bladder outlet obstruction.  Bladder calculus.  CT Abdomen and Pelvis No Cont (10.07.20 @ 14:13) > IMPRESSION:  Bilateral moderate to severe hydroureteronephrosis. No evidence of ureteral obstruction.  Enlarged prostate with chronic changes of bladder outlet obstruction. Bladder stone and Gaston catheter in the bladder.

## 2020-10-23 NOTE — PROVIDER CONTACT NOTE (OTHER) - ASSESSMENT
Pt awake, alert and oriented x3.  Abdomen soft, non distended. Pt denies complain of abdominal pain.

## 2020-10-23 NOTE — CONSULT NOTE ADULT - ASSESSMENT
75yoM with persistent b/l hydro and VENUS    -Persistent hydro may be secondary to long standing obstruction.  Unclear if nephrostomy tube placement will be beneficial at this time  -Keep Garcia in place  -Would repeat ultrasound to evaluate hydro  -Trend creatinine.  If creatinine improves may consider a renal Lasix scan to eval for obstruction  -Continue antibiotics, follow up urine culture  -Discussed with Dr. Lam

## 2020-10-23 NOTE — PROGRESS NOTE ADULT - SUBJECTIVE AND OBJECTIVE BOX
Patient is a 75y old  Male who presents with a chief complaint of weakness (23 Oct 2020 15:31)      SUBJECTIVE / OVERNIGHT EVENTS:     MEDICATIONS  (STANDING):  aspirin  chewable 81 milliGRAM(s) Oral daily  finasteride 5 milliGRAM(s) Oral daily  heparin   Injectable 5000 Unit(s) SubCutaneous every 8 hours  lactated ringers. 1000 milliLiter(s) (100 mL/Hr) IV Continuous <Continuous>  tamsulosin 0.4 milliGRAM(s) Oral at bedtime    MEDICATIONS  (PRN):  ondansetron Injectable 4 milliGRAM(s) IV Push every 6 hours PRN Nausea and/or Vomiting      CAPILLARY BLOOD GLUCOSE        I&O's Summary    22 Oct 2020 07:  -  23 Oct 2020 07:00  --------------------------------------------------------  IN: 1200 mL / OUT: 1450 mL / NET: -250 mL    23 Oct 2020 07:  -  23 Oct 2020 23:59  --------------------------------------------------------  IN: 1800 mL / OUT: 1350 mL / NET: 450 mL      T(C): 36.7 (10-23-20 @ 22:19), Max: 37.3 (10-23-20 @ 14:23)  HR: 81 (10-23-20 @ 22:19) (80 - 83)  BP: 134/64 (10-23-20 @ 22:19) (112/60 - 134/64)  RR: 18 (10-23-20 @ 22:19) (17 - 18)  SpO2: 99% (10-23-20 @ 22:19) (98% - 99%)    PHYSICAL EXAM:  GENERAL: NAD, well-developed  HEAD:  Atraumatic, Normocephalic  EYES: EOMI, PERRLA, conjunctiva and sclera clear  NECK: Supple, No JVD  CHEST/LUNG: Clear to auscultation bilaterally; No wheeze  HEART: Regular rate and rhythm; No murmurs, rubs, or gallops  ABDOMEN: Soft, Nontender, Nondistended; Bowel sounds present  EXTREMITIES:  2+ Peripheral Pulses, No clubbing, cyanosis, or edema  PSYCH: AAOx3  NEUROLOGY: non-focal  SKIN: No rashes or lesions    LABS:                        9.1    11.67 )-----------( 214      ( 22 Oct 2020 12:00 )             30.0     10    136  |  105  |  72<H>  ----------------------------<  119<H>  4.8   |  17<L>  |  4.83<H>    Ca    9.6      22 Oct 2020 12:00    TPro  7.6  /  Alb  4.1  /  TBili  0.5  /  DBili  x   /  AST  16  /  ALT  41  /  AlkPhos  65  10-22    PT/INR - ( 22 Oct 2020 12:00 )   PT: 11.9 SEC;   INR: 1.04          PTT - ( 22 Oct 2020 12:00 )  PTT:35.5 SEC      Urinalysis Basic - ( 23 Oct 2020 20:05 )    Color: COLORLESS / Appearance: Lt TURBID / S.015 / pH: 6.5  Gluc: NEGATIVE / Ketone: NEGATIVE  / Bili: NEGATIVE / Urobili: NORMAL   Blood: LARGE / Protein: 70 / Nitrite: NEGATIVE   Leuk Esterase: LARGE / RBC: >50 / WBC 11-25   Sq Epi: OCC / Non Sq Epi: x / Bacteria: FEW        RADIOLOGY & ADDITIONAL TESTS:    Imaging Personally Reviewed:    Consultant(s) Notes Reviewed:      Care Discussed with Consultants/Other Providers:

## 2020-10-23 NOTE — CHART NOTE - NSCHARTNOTEFT_GEN_A_CORE
Spoke to IR re: possible nephrostomy tube placement as per Dr. Armenta's prior discussion with urology.  Patient still awaiting renal US to eval for hydro.  Current urology note recommends monitoring creatinine and possible  renal Lasix scan to eval for obstruction if Cr improves.  Will follow up renal ultrasound and with urology regarding plans.  Discussed with Dr. Armenta

## 2020-10-23 NOTE — CONSULT NOTE ADULT - ASSESSMENT
75M with CAD/stent, hypertension, BPH, CKD, nephrolithiasis here after recent hospitalization for VENUS on CKD due to bladder outlet obstruction requiring catheter.  Now with sepsis secondary to CAUTI.  In the past UC+ enterococcus.    Sepsis secondary to CAUTI  - change to unasyn 1.5 g q12  - f/u UC  - can stop ceftriaxone    Leukocytosis  - trend wbc  - monitor for fever    VENUS  - per renal    I have discussed plan of care as detailed above with medicine    Please call the ID service [phone] with questions or concerns over the weekend 75M with CAD/stent, hypertension, BPH, CKD, nephrolithiasis here after recent hospitalization for VENUS on CKD due to bladder outlet obstruction requiring catheter.  Now with sepsis secondary to CAUTI.  In the past UC+ enterococcus.  PCN-allergic    Sepsis secondary to CAUTI  - f/u UC  - continue ceftriaxone  - vancomycin 500mg IV x1  - can check level in am    Leukocytosis  - trend wbc  - monitor for fever    VENUS  - per renal    I have discussed plan of care as detailed above with medicine    Please call the ID service [phone] with questions or concerns over the weekend

## 2020-10-23 NOTE — CONSULT NOTE ADULT - SUBJECTIVE AND OBJECTIVE BOX
[facility]   MD MOHIT CAMEJO DO ANAM SIDDIQUI ANGELA WONG, PA        TEL:  OFFICE: [phone]  DR KIM CELL: [phone]  DR. JACOB CELL: [phone]  DR. SINGH CELL: [phone]  GIBSON DSOUZA CELL: [phone]    From 5pm-7am answering service [phone]    --- INITIAL RENAL CONSULT NOTE ---date of service 10-23-20 @ 11:35    HPI:  76yo M with pmhx cad, htn, bph, recent admission to [facility] for VENUS severe b/l hydro s/p gaston and was discharged home 2 days ago with gaston in place p/w nausea, vomiting cloudy urine and weakness  patient recently admitted to [facility] with VENUS sec to b/l hydro scr >9 slowly improved and was discharged with scr 4.1.  following last admission s/p gaston given improving scr and good urine output patient was to follow up outpatient for further management.   pt seen and examined at bedside c/o weakness, dizziness when getting up, nausea but no vomiting.       Allergies:  penicillin (Rash)      PAST MEDICAL & SURGICAL HISTORY:  HLD (hyperlipidemia)    CAD (coronary artery disease)    HTN (hypertension)        Home Medications Reviewed    Hospital Medications:   MEDICATIONS  (STANDING):  aspirin  chewable 81 milliGRAM(s) Oral daily  cefTRIAXone   IVPB 1000 milliGRAM(s) IV Intermittent every 24 hours  finasteride 5 milliGRAM(s) Oral daily  heparin   Injectable 5000 Unit(s) SubCutaneous every 8 hours  lactated ringers. 1000 milliLiter(s) (100 mL/Hr) IV Continuous <Continuous>  tamsulosin 0.4 milliGRAM(s) Oral at bedtime      SOCIAL HISTORY:  Denies ETOh, Smoking,     FAMILY HISTORY:  No pertinent family history in first degree relatives        REVIEW OF SYSTEMS:  CONSTITUTIONAL: + weakness, no fevers or chills  EYES/ENT: No visual changes;  No vertigo or throat pain   NECK: No pain or stiffness  RESPIRATORY: No cough, wheezing, hemoptysis; No shortness of breath  CARDIOVASCULAR: No chest pain or palpitations.  GASTROINTESTINAL: No abdominal or epigastric pain. + nausea, no vomiting, or hematemesis; No diarrhea or constipation. No melena or hematochezia.  GENITOURINARY: No dysuria, frequency, foamy urine, urinary urgency, incontinence or hematuria  NEUROLOGICAL: No numbness or weakness  SKIN: No itching, burning, rashes, or lesions   VASCULAR: No bilateral lower extremity edema.   All other review of systems is negative unless indicated above.    VITALS:  T(F): 98.2 (10-23-20 @ 10:50), Max: 101.4 (10-22-20 @ 11:45)  HR: 80 (10-23-20 @ 10:50)  BP: 109/60 (10-23-20 @ 10:50)  RR: 17 (10-23-20 @ 10:50)  SpO2: 98% (10-23-20 @ 10:50)  Wt(kg): --    10-22 @ 07:01  -  10-23 @ 07:00  --------------------------------------------------------  IN: 1200 mL / OUT: 1450 mL / NET: -250 mL    10-23 @ 07:01  -  10-23 @ 11:35  --------------------------------------------------------  IN: 0 mL / OUT: 450 mL / NET: -450 mL      Height (cm): 177.8 (10-22 @ 17:27)  Weight (kg): 81.647 (10-22 @ 21:35)  BMI (kg/m2): 25.8 (10-22 @ 21:35)  BSA (m2): 2 (10-22 @ 21:35)    PHYSICAL EXAM:  Constitutional: NAD  HEENT: anicteric sclera, oropharynx clear, MMM  Neck: No JVD  Respiratory: CTAB, no wheezes, rales or rhonchi  Cardiovascular: S1, S2, RRR  Gastrointestinal: BS+, soft, NT/ND  Extremities: No cyanosis or clubbing. No peripheral edema  Neurological: A/O x 3, no focal deficits  Psychiatric: Normal mood, normal affect  : + gaston.   Skin: No rashes      LABS:  10-22    136  |  105  |  72<H>  ----------------------------<  119<H>  4.8   |  17<L>  |  4.83<H>    Ca    9.6      22 Oct 2020 12:00    TPro  7.6  /  Alb  4.1  /  TBili  0.5  /  DBili      /  AST  16  /  ALT  41  /  AlkPhos  65  10-22    Creatinine Trend: 4.83 <--, 4.22 <--, 4.11 <--, 4.18 <--, 4.49 <--, 4.35 <--, 4.48 <--                        9.1    11.67 )-----------( 214      ( 22 Oct 2020 12:00 )             30.0     Urine Studies:  Urinalysis Basic - ( 22 Oct 2020 12:10 )    Color: YELLOW / Appearance: TURBID / S.012 / pH: 5.5  Gluc: NEGATIVE / Ketone: NEGATIVE  / Bili: NEGATIVE / Urobili: NORMAL   Blood: LARGE / Protein: 100 / Nitrite: POSITIVE   Leuk Esterase: LARGE / RBC: >50 / WBC >50   Sq Epi: FEW / Non Sq Epi:  / Bacteria: LARGE          RADIOLOGY & ADDITIONAL STUDIES:

## 2020-10-24 DIAGNOSIS — D64.9 ANEMIA, UNSPECIFIED: ICD-10-CM

## 2020-10-24 LAB
ANION GAP SERPL CALC-SCNC: 11 MMO/L — SIGNIFICANT CHANGE UP (ref 7–14)
BLD GP AB SCN SERPL QL: NEGATIVE — SIGNIFICANT CHANGE UP
BUN SERPL-MCNC: 65 MG/DL — HIGH (ref 7–23)
CALCIUM SERPL-MCNC: 8.3 MG/DL — LOW (ref 8.4–10.5)
CHLORIDE SERPL-SCNC: 107 MMOL/L — SIGNIFICANT CHANGE UP (ref 98–107)
CK MB BLD-MCNC: < 1 NG/ML — LOW (ref 1–6.6)
CK MB BLD-MCNC: SIGNIFICANT CHANGE UP (ref 0–2.5)
CK SERPL-CCNC: 50 U/L — SIGNIFICANT CHANGE UP (ref 30–200)
CO2 SERPL-SCNC: 18 MMOL/L — LOW (ref 22–31)
CREAT SERPL-MCNC: 3.88 MG/DL — HIGH (ref 0.5–1.3)
FERRITIN SERPL-MCNC: 587.2 NG/ML — HIGH (ref 30–400)
GLUCOSE SERPL-MCNC: 101 MG/DL — HIGH (ref 70–99)
HCT VFR BLD CALC: 18.2 % — CRITICAL LOW (ref 39–50)
HCT VFR BLD CALC: 21 % — CRITICAL LOW (ref 39–50)
HCT VFR BLD CALC: 21.5 % — LOW (ref 39–50)
HCT VFR BLD CALC: 21.6 % — LOW (ref 39–50)
HGB BLD-MCNC: 5.8 G/DL — CRITICAL LOW (ref 13–17)
HGB BLD-MCNC: 6.5 G/DL — CRITICAL LOW (ref 13–17)
HGB BLD-MCNC: 6.8 G/DL — CRITICAL LOW (ref 13–17)
HGB BLD-MCNC: 6.8 G/DL — CRITICAL LOW (ref 13–17)
IRON SATN MFR SERPL: 170 UG/DL — SIGNIFICANT CHANGE UP (ref 155–535)
IRON SATN MFR SERPL: 22 UG/DL — LOW (ref 45–165)
MAGNESIUM SERPL-MCNC: 1.7 MG/DL — SIGNIFICANT CHANGE UP (ref 1.6–2.6)
MCHC RBC-ENTMCNC: 28.3 PG — SIGNIFICANT CHANGE UP (ref 27–34)
MCHC RBC-ENTMCNC: 28.5 PG — SIGNIFICANT CHANGE UP (ref 27–34)
MCHC RBC-ENTMCNC: 28.6 PG — SIGNIFICANT CHANGE UP (ref 27–34)
MCHC RBC-ENTMCNC: 28.6 PG — SIGNIFICANT CHANGE UP (ref 27–34)
MCHC RBC-ENTMCNC: 31 % — LOW (ref 32–36)
MCHC RBC-ENTMCNC: 31.5 % — LOW (ref 32–36)
MCHC RBC-ENTMCNC: 31.6 % — LOW (ref 32–36)
MCHC RBC-ENTMCNC: 31.9 % — LOW (ref 32–36)
MCV RBC AUTO: 89.7 FL — SIGNIFICANT CHANGE UP (ref 80–100)
MCV RBC AUTO: 90.3 FL — SIGNIFICANT CHANGE UP (ref 80–100)
MCV RBC AUTO: 90.4 FL — SIGNIFICANT CHANGE UP (ref 80–100)
MCV RBC AUTO: 91.3 FL — SIGNIFICANT CHANGE UP (ref 80–100)
NRBC # FLD: 0 K/UL — SIGNIFICANT CHANGE UP (ref 0–0)
PHOSPHATE SERPL-MCNC: 4.1 MG/DL — SIGNIFICANT CHANGE UP (ref 2.5–4.5)
PLATELET # BLD AUTO: 140 K/UL — LOW (ref 150–400)
PLATELET # BLD AUTO: 155 K/UL — SIGNIFICANT CHANGE UP (ref 150–400)
PLATELET # BLD AUTO: 156 K/UL — SIGNIFICANT CHANGE UP (ref 150–400)
PLATELET # BLD AUTO: 165 K/UL — SIGNIFICANT CHANGE UP (ref 150–400)
PMV BLD: 10.2 FL — SIGNIFICANT CHANGE UP (ref 7–13)
PMV BLD: 10.5 FL — SIGNIFICANT CHANGE UP (ref 7–13)
PMV BLD: 11 FL — SIGNIFICANT CHANGE UP (ref 7–13)
PMV BLD: 11.8 FL — SIGNIFICANT CHANGE UP (ref 7–13)
POTASSIUM SERPL-MCNC: 5 MMOL/L — SIGNIFICANT CHANGE UP (ref 3.5–5.3)
POTASSIUM SERPL-SCNC: 5 MMOL/L — SIGNIFICANT CHANGE UP (ref 3.5–5.3)
RBC # BLD: 2.03 M/UL — LOW (ref 4.2–5.8)
RBC # BLD: 2.3 M/UL — LOW (ref 4.2–5.8)
RBC # BLD: 2.38 M/UL — LOW (ref 4.2–5.8)
RBC # BLD: 2.39 M/UL — LOW (ref 4.2–5.8)
RBC # FLD: 12.4 % — SIGNIFICANT CHANGE UP (ref 10.3–14.5)
RBC # FLD: 12.4 % — SIGNIFICANT CHANGE UP (ref 10.3–14.5)
RBC # FLD: 13.1 % — SIGNIFICANT CHANGE UP (ref 10.3–14.5)
RBC # FLD: 13.3 % — SIGNIFICANT CHANGE UP (ref 10.3–14.5)
RH IG SCN BLD-IMP: POSITIVE — SIGNIFICANT CHANGE UP
RH IG SCN BLD-IMP: POSITIVE — SIGNIFICANT CHANGE UP
SODIUM SERPL-SCNC: 136 MMOL/L — SIGNIFICANT CHANGE UP (ref 135–145)
TROPONIN T, HIGH SENSITIVITY: 26 NG/L — SIGNIFICANT CHANGE UP (ref ?–14)
UIBC SERPL-MCNC: 148 UG/DL — SIGNIFICANT CHANGE UP (ref 110–370)
VANCOMYCIN FLD-MCNC: 6.3 UG/ML — SIGNIFICANT CHANGE UP
WBC # BLD: 4.05 K/UL — SIGNIFICANT CHANGE UP (ref 3.8–10.5)
WBC # BLD: 4.49 K/UL — SIGNIFICANT CHANGE UP (ref 3.8–10.5)
WBC # BLD: 4.83 K/UL — SIGNIFICANT CHANGE UP (ref 3.8–10.5)
WBC # BLD: 5.95 K/UL — SIGNIFICANT CHANGE UP (ref 3.8–10.5)
WBC # FLD AUTO: 4.05 K/UL — SIGNIFICANT CHANGE UP (ref 3.8–10.5)
WBC # FLD AUTO: 4.49 K/UL — SIGNIFICANT CHANGE UP (ref 3.8–10.5)
WBC # FLD AUTO: 4.83 K/UL — SIGNIFICANT CHANGE UP (ref 3.8–10.5)
WBC # FLD AUTO: 5.95 K/UL — SIGNIFICANT CHANGE UP (ref 3.8–10.5)

## 2020-10-24 PROCEDURE — 99231 SBSQ HOSP IP/OBS SF/LOW 25: CPT

## 2020-10-24 PROCEDURE — 93010 ELECTROCARDIOGRAM REPORT: CPT | Mod: 76

## 2020-10-24 PROCEDURE — 99232 SBSQ HOSP IP/OBS MODERATE 35: CPT

## 2020-10-24 PROCEDURE — 76770 US EXAM ABDO BACK WALL COMP: CPT | Mod: 26

## 2020-10-24 RX ORDER — DILTIAZEM HCL 120 MG
30 CAPSULE, EXT RELEASE 24 HR ORAL EVERY 6 HOURS
Refills: 0 | Status: DISCONTINUED | OUTPATIENT
Start: 2020-10-24 | End: 2020-10-24

## 2020-10-24 RX ORDER — DILTIAZEM HCL 120 MG
30 CAPSULE, EXT RELEASE 24 HR ORAL EVERY 6 HOURS
Refills: 0 | Status: DISCONTINUED | OUTPATIENT
Start: 2020-10-24 | End: 2020-10-25

## 2020-10-24 RX ORDER — CEFTRIAXONE 500 MG/1
1000 INJECTION, POWDER, FOR SOLUTION INTRAMUSCULAR; INTRAVENOUS EVERY 24 HOURS
Refills: 0 | Status: DISCONTINUED | OUTPATIENT
Start: 2020-10-24 | End: 2020-10-29

## 2020-10-24 RX ORDER — DILTIAZEM HCL 120 MG
10 CAPSULE, EXT RELEASE 24 HR ORAL ONCE
Refills: 0 | Status: COMPLETED | OUTPATIENT
Start: 2020-10-24 | End: 2020-10-24

## 2020-10-24 RX ADMIN — ONDANSETRON 4 MILLIGRAM(S): 8 TABLET, FILM COATED ORAL at 00:58

## 2020-10-24 RX ADMIN — SODIUM CHLORIDE 100 MILLILITER(S): 9 INJECTION, SOLUTION INTRAVENOUS at 21:41

## 2020-10-24 RX ADMIN — TAMSULOSIN HYDROCHLORIDE 0.4 MILLIGRAM(S): 0.4 CAPSULE ORAL at 21:40

## 2020-10-24 RX ADMIN — HEPARIN SODIUM 5000 UNIT(S): 5000 INJECTION INTRAVENOUS; SUBCUTANEOUS at 00:58

## 2020-10-24 RX ADMIN — Medication 81 MILLIGRAM(S): at 11:10

## 2020-10-24 RX ADMIN — CEFTRIAXONE 100 MILLIGRAM(S): 500 INJECTION, POWDER, FOR SOLUTION INTRAMUSCULAR; INTRAVENOUS at 18:40

## 2020-10-24 RX ADMIN — HEPARIN SODIUM 5000 UNIT(S): 5000 INJECTION INTRAVENOUS; SUBCUTANEOUS at 11:10

## 2020-10-24 RX ADMIN — Medication 30 MILLIGRAM(S): at 18:39

## 2020-10-24 RX ADMIN — SODIUM CHLORIDE 100 MILLILITER(S): 9 INJECTION, SOLUTION INTRAVENOUS at 12:56

## 2020-10-24 RX ADMIN — Medication 30 MILLIGRAM(S): at 12:56

## 2020-10-24 RX ADMIN — SODIUM CHLORIDE 100 MILLILITER(S): 9 INJECTION, SOLUTION INTRAVENOUS at 11:11

## 2020-10-24 RX ADMIN — FINASTERIDE 5 MILLIGRAM(S): 5 TABLET, FILM COATED ORAL at 11:10

## 2020-10-24 RX ADMIN — Medication 10 MILLIGRAM(S): at 11:40

## 2020-10-24 NOTE — PROGRESS NOTE ADULT - PROBLEM SELECTOR PLAN 2
Gaston associated UTI / Acute pyelonephritis- Continue with Ceftriaxone. Follow up urine blood cultures. Urology consult. Renal consult called. Follow up recs.   Bilateral moderate hydroureteronephrosis- last sono, post obstructive Uropathy.    Continue with gaston, Flomax. Continue with Finasteride. Gaston associated UTI / Acute pyelonephritis- Continue with Ceftriaxone. Follow up urine blood cultures. Urology, Renal consults appreciated.    Bilateral moderate hydroureteronephrosis- last sono, post obstructive Uropathy.    Continue with gaston, Flomax. Continue with Finasteride.

## 2020-10-24 NOTE — CHART NOTE - NSCHARTNOTEFT_GEN_A_CORE
Medicine Subsequent Hospital Care Note- ACP  CC:weakness  HPI/Subjective: 75M PMH CAD, HTN & BPH admit with nausea, weakness and cloudy urine, admitted with You were started uti.      ROS:  Denies fever, chills, diaphoresis , malaise, night sweat, generalized weakness, SOB cough, sputum production, wheezing, hemoptysis, complains of mild chest pain, GUSTAFSON, orthopnea, PND, palpitations, diaphoresis, lightheadedness, dizziness, syncope, edema. nausea, vomiting, diarrhea, constipation, abdominal pain, melena, hematochezia, dysphagia, dysuria, frequency, urgency, hematuria, nocturia. endorses headache and weakness.     Last  10/23 light brown  -------------------------------------------------------------------------------------------------------------------------------------------------  Vital Signs:  Vital Signs Last 24 Hrs  T(C): 36.9 (10-24-20 @ 04:53), Max: 37.3 (10-23-20 @ 14:23)  T(F): 98.5 (10-24-20 @ 04:53), Max: 99.1 (10-23-20 @ 14:23)  HR: 89 (10-24-20 @ 04:53) (80 - 89)  BP: 129/73 (10-24-20 @ 04:53) (109/60 - 137/75)  RR: 17 (10-24-20 @ 04:53) (17 - 18)  SpO2: 97% (10-24-20 @ 04:53) (96% - 99%) on (O2)    Telemetry/Alarms:  General: WN/WD NAD  Neurology: Awake, nonfocal, BRONSON x 4  Eyes: Scleras clear, PERRLA/ EOMI, Gross vision intact  ENT:Gross hearing intact, grossly patent pharynx, no stridor  Neck: Neck supple, trachea midline, No JVD,   Respiratory: CTA B/L, No wheezing, rales, rhonchi  CV: RRR, S1S2, no murmurs, rubs or gallops  Abdominal: Soft, NT, ND +BS,   Extremities: No edema, + peripheral pulses  Skin: pale, No Rashes, Hematoma, Ecchymosis  Lymphatic: No Neck, axilla, groin LAD  Psych: Oriented x 3, normal affect, frustrated  Incisions: None  Tubes: Garcia draining clear yellow urine  Relevant labs, radiology and Medications reviewed                        6.5    4.83  )-----------( 156      ( 24 Oct 2020 08:48 )             21.0     10-24    136  |  107  |  65<H>  ----------------------------<  101<H>  5.0   |  18<L>  |  3.88<H>    Ca    8.3<L>      24 Oct 2020 04:57  Phos  4.1     10-24  Mg     1.7     10-24    TPro  7.6  /  Alb  4.1  /  TBili  0.5  /  DBili  x   /  AST  16  /  ALT  41  /  AlkPhos  65  10-22    PT/INR - ( 22 Oct 2020 12:00 )   PT: 11.9 SEC;   INR: 1.04          PTT - ( 22 Oct 2020 12:00 )  PTT:35.5 SEC  MEDICATIONS  (STANDING):  aspirin  chewable 81 milliGRAM(s) Oral daily  finasteride 5 milliGRAM(s) Oral daily  heparin   Injectable 5000 Unit(s) SubCutaneous every 8 hours  lactated ringers. 1000 milliLiter(s) (100 mL/Hr) IV Continuous <Continuous>  tamsulosin 0.4 milliGRAM(s) Oral at bedtime    MEDICATIONS  (PRN):  ondansetron Injectable 4 milliGRAM(s) IV Push every 6 hours PRN Nausea and/or Vomiting    I&O's Summary    23 Oct 2020 07:01  -  24 Oct 2020 07:00  --------------------------------------------------------  IN: 2900 mL / OUT: 2400 mL / NET: 500 mL      I reviewed the above lab results, tests, telemetry, and  EKG interpretation. .  Assessment  75y Male  w/ PAST MEDICAL & SURGICAL HISTORY:  HLD (hyperlipidemia)    CAD (coronary artery disease)    HTN (hypertension)    admitted with complaints of Patient is a 75y old  Male who presents with a chief complaint of weakness (23 Oct 2020 15:31)  now c/o weakness & chest pain, found to be anemic with H&H 6.5/21.0.     PLAN  1)EKG, HST, CK & CKMB  2)Fecal Occult Blood  3)GI Consult  4)Transfuse 1 unit PRBC & reassess H&H    Disposition: Full Code  Discussed with Dr. Armenta, Clinical findings, labs, tests, telemetry, and ekg reviewed with attending. Will monitor patient closely.   [  X]Low complexity/risk ( Time> 15min)

## 2020-10-24 NOTE — PROGRESS NOTE ADULT - PROBLEM SELECTOR PLAN 1
Hb noted to be low, GI consult called.   No overt signs of bleeding.   Hold SQ Heparin. Transfuse PRBC.

## 2020-10-24 NOTE — PROGRESS NOTE ADULT - SUBJECTIVE AND OBJECTIVE BOX
[facility] [place] [facility]   MD MOHIT CAMEJO DO ANAM SIDDIQUI ANGELA WONG, PA    TEL:  OFFICE: [phone]  DR KIM CELL: [phone]  DR. JACOB CELL: [phone]  DR. SINGH CELL: [phone]  GIBSON DSOUZA CELL: [phone]    From 5pm-7am Answering Service [phone]    -- RENAL FOLLOW UP NOTE ---Date of Service 10-24-20 @ 12:21    Patient is a 75y old  Male who presents with a chief complaint of weakness (23 Oct 2020 15:31)      Patient seen and examined at bedside. + dizziness    VITALS:  T(F): 98.1 (10-24-20 @ 11:32), Max: 99.1 (10-23-20 @ 14:23)  HR: 156 (10-24-20 @ 11:32)  BP: 137/57 (10-24-20 @ 11:32)  RR: 20 (10-24-20 @ 11:32)  SpO2: 98% (10-24-20 @ 11:32)  Wt(kg): --    10-23 @ 07:01  -  10-24 @ 07:00  --------------------------------------------------------  IN: 2900 mL / OUT: 2400 mL / NET: 500 mL          PHYSICAL EXAM:  Constitutional: NAD  Neck: No JVD  Respiratory: CTAB, no wheezes, rales or rhonchi  Cardiovascular: S1, S2, RRR  Gastrointestinal: BS+, soft, NT/ND  Extremities: No peripheral edema    Hospital Medications:   MEDICATIONS  (STANDING):  aspirin  chewable 81 milliGRAM(s) Oral daily  diltiazem    Tablet 30 milliGRAM(s) Oral every 6 hours  finasteride 5 milliGRAM(s) Oral daily  heparin   Injectable 5000 Unit(s) SubCutaneous every 8 hours  lactated ringers. 1000 milliLiter(s) (100 mL/Hr) IV Continuous <Continuous>  tamsulosin 0.4 milliGRAM(s) Oral at bedtime      LABS:  10-24    136  |  107  |  65<H>  ----------------------------<  101<H>  5.0   |  18<L>  |  3.88<H>    Ca    8.3<L>      24 Oct 2020 04:57  Phos  4.1     10-24  Mg     1.7     10-24      Creatinine Trend: 3.88 <--, 4.83 <--, 4.22 <--, 4.11 <--, 4.18 <--    Phosphorus Level, Serum: 4.1 mg/dL (10-24 @ 04:57)  Iron Total, Serum: 22 ug/dL (10-24 @ 04:57)  Ferritin, Serum: 587.2 ng/mL (10-24 @ 04:57)                              6.5    4.83  )-----------( 156      ( 24 Oct 2020 08:48 )             21.0     Urine Studies:  Urinalysis - [10-23-20 @ 20:05]      Color COLORLESS / Appearance Lt TURBID / SG 1.015 / pH 6.5      Gluc NEGATIVE / Ketone NEGATIVE  / Bili NEGATIVE / Urobili NORMAL       Blood LARGE / Protein 70 / Leuk Est LARGE / Nitrite NEGATIVE      RBC >50 / WBC 11-25 / Hyaline 1+ / Gran  / Sq Epi OCC / Non Sq Epi  / Bacteria FEW    Urine Creatinine 55.10      [10-23-20 @ 20:05]  Urine Osmolality 421      [10-23-20 @ 20:05]    Iron 22, TIBC 170, %sat --      [10-24-20 @ 04:57]  Ferritin 587.2      [10-24-20 @ 04:57]  TSH 1.01      [10-08-20 @ 02:55]    HCV 0.13, Nonreact      [10-08-20 @ 10:59]      RADIOLOGY & ADDITIONAL STUDIES:

## 2020-10-24 NOTE — CHART NOTE - NSCHARTNOTEFT_GEN_A_CORE
Patient took Ceftriaxone two days ago and did not have any rashes, pruritis or anaphylactic reaction.  Will continue with  Ceftriaxone.  Tanika DEL EÓN Patient had Pcn allergy 20 years ago and  took Ceftriaxone two days ago and did not experience any rashes, pruritis or anaphylactic reaction.  Will continue with  Ceftriaxone.  Discussed with EMA DE LEÓN

## 2020-10-24 NOTE — PROGRESS NOTE ADULT - ASSESSMENT
74yo M with pmhx cad, htn, bph, recent admission to [facility] for CAREN severe b/l hydro s/p gaston and was discharged home 2 days ago with gaston in place p/w nausea, vomiting cloudy urine and weakness  nephrology consulted for renal failure    Renal failure  recent CAREN sec to obstructive uropathy, scr peaked >9 and had improved and stable ~4.1 upon discharge  scr peaked 4.8 on this admission improving today  Caren likely sec to prerenal vs obstruction   Gaston in place and is non oliguric  repeat kidney US to assess hydronephrosis.  follow up  check urine cr, na  agree with current IVF  monitor bmp, urine output  avoid nephrotoxic agents  renal dose medication     Proteinuria/hematuria  in setting of obstruction/ gaston  Monitor at present    Anemia  transfuse to keep hb>8  check iron panel  monitor

## 2020-10-24 NOTE — PROGRESS NOTE ADULT - PROBLEM SELECTOR PLAN 4
On CKD- Creatinine stable around 4. Monitor for now.   Continue with gentle hydration.   Renal consult called, follow up recs. On CKD- Creatinine stable around 4. Monitor for now.   Continue with gentle hydration.   Renal following.

## 2020-10-24 NOTE — CHART NOTE - NSCHARTNOTEFT_GEN_A_CORE
Notified by RN patient complains of chest pain, EKG reviewed and noted to be in a-fib with RVR.  Patient symptomatic with palpitations, fatigue and blurred vision.  Patient placed on zoll, Cardizem 10mg IVP given x1 with conversion to NSR and cardizem 30mg oral every 6 hours ordered.  Patients symptoms resolved with conversion to NSR.  Patient transferred to telemetry unit, AC not started d/t acute anemia.    Plan discussed with Dr. Armenta & Dr. Miller

## 2020-10-24 NOTE — CONSULT NOTE ADULT - SUBJECTIVE AND OBJECTIVE BOX
Patient is a 75y old  Male who presents with a chief complaint of weakness (23 Oct 2020 15:31)     .     HPI:    HPI:  76yo M with pmhx cad, htn, bph well known to me admitted to my service at [facility] for  VENUS and urine retention and was discharged home 2 days ago with gaston in place p/w nausea, vomiting cloudy urine.   Pt denies any back pain, abdominal pain, diarrhea, chest pain, SOB, cough, headache, neck stiffness.    In the ed patient noted to have positive UA, temp 101.4, patient received Ceftriaxone x 1 dose.    (22 Oct 2020 17:27)          REVIEW OF SYSTEMS  Constitutional:   No fever, no fatigue, no pallor, no night sweats, no weight loss.  HEENT:   No eye pain, no vision changes, no icterus, no mouth ulcers.  Respiratory:   No shortness of breath, no cough, no respiratory distress.   Cardiovascular:   No chest pain, no palpitations.   Gastrointestinal: No abdominal pain, + nausea, + vomiting , no diarrhea no constipation, no hematochezia, no melena.  Skin:   No rashes, no jaundice, no eczema.   Musculoskeletal:   No joint pain, no swelling, no myalgia.   Neurologic:   No headache, no seizure, no weakness.   Genitourinary:   No dysuria, no decreased urine output.  Psychiatric:  No depression, no anxiety,   Endocrine:   No thyroid disease, no diabetes.  Heme/Lymphatic:   No anemia, no blood transfusions, no lymph node enlargement, no bleeding, no bruising.  ___________________________________________________________________________________________  Allergies    penicillin (Rash)    Intolerances      MEDICATIONS  (STANDING):  aspirin  chewable 81 milliGRAM(s) Oral daily  cefTRIAXone   IVPB 1000 milliGRAM(s) IV Intermittent every 24 hours  diltiazem    Tablet 30 milliGRAM(s) Oral every 6 hours  finasteride 5 milliGRAM(s) Oral daily  lactated ringers. 1000 milliLiter(s) (100 mL/Hr) IV Continuous <Continuous>  tamsulosin 0.4 milliGRAM(s) Oral at bedtime    MEDICATIONS  (PRN):  ondansetron Injectable 4 milliGRAM(s) IV Push every 6 hours PRN Nausea and/or Vomiting      PAST MEDICAL & SURGICAL HISTORY:  HLD (hyperlipidemia)    CAD (coronary artery disease)    HTN (hypertension)      FAMILY HISTORY:  No pertinent family history in first degree relatives      Social History: No history of : Tobacco use, IVDA, EToH  ______________________________________________________________________________________    PHYSICAL EXAM    Daily     Daily   BMI: 25.8 (10-22 @ 21:35)  Change in Weight:  Vital Signs Last 24 Hrs  T(C): 36.8 (24 Oct 2020 21:44), Max: 36.9 (24 Oct 2020 01:47)  T(F): 98.3 (24 Oct 2020 21:44), Max: 98.5 (24 Oct 2020 04:53)  HR: 76 (24 Oct 2020 21:44) (75 - 156)  BP: 129/68 (24 Oct 2020 21:44) (109/67 - 137/75)  BP(mean): --  RR: 18 (24 Oct 2020 21:44) (16 - 20)  SpO2: 98% (24 Oct 2020 21:44) (96% - 99%)    General:  Well developed, well nourished, alert and active, no pallor, NAD.  HEENT:    Normal appearance of conjunctiva, ears, nose, lips, oropharynx, and oral mucosa, anicteric.  Neck:  No masses, no asymmetry.  Lymph Nodes:  No lymphadenopathy.   Cardiovascular:  RRR normal S1/S2, no murmur.  Respiratory:  CTA B/L, normal respiratory effort.   Abdominal:   soft, no masses or tenderness, normoactive BS, NT/ND, no HSM.  Extremities:   No clubbing or cyanosis, normal capillary refill, no edema.   Skin:   No rash, jaundice, lesions, eczema.   Musculoskeletal:  No joint swelling, erythema or tenderness.   Neuro: No focal deficits.   Other:   _______________________________________________________________________________________________  Lab Results:                          5.8    4.05  )-----------( 140      ( 24 Oct 2020 20:52 )             18.2     10-24    136  |  107  |  65<H>  ----------------------------<  101<H>  5.0   |  18<L>  |  3.88<H>    Ca    8.3<L>      24 Oct 2020 04:57  Phos  4.1     10-24  Mg     1.7     10-24            CARDIAC MARKERS ( 24 Oct 2020 04:57 )  x     / x     / 50 u/L / < 1.00 ng/mL / x          Stool Results:          RADIOLOGY RESULTS:  < from: CT Abdomen and Pelvis No Cont (10.07.20 @ 14:13) >    EXAM:  CT ABDOMEN AND PELVIS                            PROCEDURE DATE:  10/07/2020            INTERPRETATION:  CLINICAL INFORMATION: Acute renal failure, evaluate for obstructive uropathy    COMPARISON: None.    PROCEDURE:  CT of the Abdomen and Pelvis was performed without intravenous contrast.  Intravenous contrast: None.  Oral contrast: None.  Sagittal and coronal reformats were performed.    FINDINGS:  LOWER CHEST: Coronary artery calcifications/stents. Hiatal hernia.    LIVER: Within normal limits.  BILE DUCTS: Normal caliber.  GALLBLADDER: Within normal limits.  SPLEEN: Within normal limits.  PANCREAS: Within normal limits.  ADRENALS: Within normal limits.  KIDNEYS/URETERS: Right lower pole renal cyst. Bilateral moderate to severe renal hydroureteronephrosis to the level of the bladder. No renal calculi. No evidence of ureteral obstruction    BLADDER: Bladder calculus measuring 1.2 cm. Gaston catheter. Thickening of the bladder wall, likely chronic changes of bladder outlet obstruction.  REPRODUCTIVE ORGANS: Enlarged prostate with prominent central lobe.    BOWEL: No bowel obstruction. Appendix is normal. Colonic diverticulosis.  PERITONEUM: No ascites.  VESSELS: Atherosclerotic changes.  RETROPERITONEUM/LYMPH NODES: No lymphadenopathy.  ABDOMINAL WALL: Fat-containing left inguinal hernia.  BONES: Degenerative changes.    IMPRESSION:  Bilateral moderate to severe hydroureteronephrosis. No evidence of ureteral obstruction.    Enlarged prostate with chronic changes of bladder outlet obstruction.    Bladder stone and Gaston catheter in the bladder.                KEVIN NEWTON M.D., RESIDENT RADIOLOGIST  This document has been electronically signed.  CHINMAY LOVE M.D., ATTENDING RADIOLOGIST  This document has been electronically signed. Oct  7 2020  3:09PM    < end of copied text >    SURGICAL PATHOLOGY:

## 2020-10-24 NOTE — PROGRESS NOTE ADULT - SUBJECTIVE AND OBJECTIVE BOX
Patient is a 75y old  Male who presents with a chief complaint of weakness  f/u possible uti  Interval History/ROS:  no fever/chills.  gaston okay.  afib RVR.  now transferring to [facility].  Had SOB.  Remainder of ROS otherwise negative.  PAST MEDICAL & SURGICAL HISTORY: HLD (hyperlipidemia) CAD (coronary artery disease) HTN (hypertension) BPH CKD  Allergies penicillin (Rash)  ANTIMICROBIALS: cefTRIAXone   IVPB 1000 every 24 hours (10/22-) vancomycin  IVPB (10/23 x1)  MEDICATIONS  (STANDING): aspirin  chewable 81 daily diltiazem    Tablet 30 every 6 hours finasteride 5 daily heparin   Injectable 5000 every 8 hours tamsulosin 0.4 at bedtime  Vital Signs Last 24 Hrs T(F): 98.1 (10-24-20 @ 11:32), Max: 99.1 (10-23-20 @ 14:23) HR: 156 (10-24-20 @ 11:32) BP: 137/57 (10-24-20 @ 11:32) RR: 20 (10-24-20 @ 11:32) SpO2: 98% (10-24-20 @ 11:32) (96% - 99%) Wt(kg): --  PHYSICAL EXAM: Constitutional: non-toxic HEAD/EYES: anicteric ENT:  supple Cardiovascular:   normal S1, S2 Respiratory:  clear BS bilaterally GI:  soft, non-tender, normal bowel sounds :  gaston with now very clear urine Musculoskeletal:  no synovitis Neurologic: awake and alert, normal strength, no focal findings Skin:  no rash, no erythema, no phlebitis Psychiatric:  awake, alert, appropriate mood                               6.5   4.83  )-----------( 156      ( 24 Oct 2020 08:48 )            21.0 10-24  136  |  107  |  65 ----------------------------<  101 5.0   |  18  |  3.88 Ca    8.3      24 Oct 2020 04:57Phos  4.1     10-24Mg     1.7     10-24  Creatinine, Serum: 3.88 (10-24) Creatinine, Serum: 4.83 (10-22) Creatinine, Serum: 4.22 (10-19) Creatinine, Serum: 4.11 (10-18) Creatinine, Serum: 4.18 (10-17)  Urinalysis Basic - ( 22 Oct 2020 12:10 ) Color: YELLOW / Appearance: TURBID / S.012 / pH: 5.5 Gluc: NEGATIVE / Ketone: NEGATIVE  / Bili: NEGATIVE / Urobili: NORMAL  Blood: LARGE / Protein: 100 / Nitrite: POSITIVE  Leuk Esterase: LARGE / RBC: >50 / WBC >50  Sq Epi: FEW / Non Sq Epi: x / Bacteria: LARGE  MICROBIOLOGY: Vancomycin Level, Random: 6.3 (10-24 @ 04:57)  Culture - Urine (collected 10-22-20 @ 14:26) Source: .Urine Catheterized Preliminary Report (10-23-20 @ 23:37):   >100,000 CFU/ml Gram Negative Rods  Culture - Blood (collected 10-22-20 @ 14:10) Source: .Blood Blood-Venous Preliminary Report (10-23-20 @ 15:01):   No growth to date.  Culture - Blood (collected 10-22-20 @ 14:10) Source: .Blood Blood-Peripheral Preliminary Report (10-23-20 @ 15:01):   No growth to date.  COVID-19 PCR: NotDetec (10-22-20 @ 12:25)  Rapid RVP Result: NotDetec (10-18-20 @ 12:02)  Culture - Blood (collected 10-18-20 @ 14:08) Source: .Blood Blood-Peripheral Final Report (10-23-20 @ 15:00):   No Growth Final  Culture - Blood (collected 10-18-20 @ 14:08) Source: .Blood Blood-Peripheral Final Report (10-23-20 @ 15:00):   No Growth Final  Rapid RVP Result: NotDetec (10-18 @ 12:02)  Culture - Urine (collected 10-07-20 @ 17:49) Source: .Urine Catheterized Final Report (10-08-20 @ 17:00):   No growth  COVID-19 IgG Antibody Interpretation: Negative (10-08-20 @ 05:12) COVID-19 PCR: NotDetec (10-07-20 @ 13:02)    RADIOLOGY: imaging below personally reviewed and agree with findings  Xray Chest 1 View AP/PA (10.22.20 @ 14:36) > IMPRESSION:  No focal consolidation to indicate pneumonia.  US Kidney and Bladder (10.15.20 @ 15:44) > IMPRESSION:  Moderate bilateral hydronephrosis demonstrating mild decrease in degree compared to prior study.  Bilateral renal parenchymal disease.  Prostatomegaly.  US Kidney and Bladder (10.11.20 @ 22:36) > IMPRESSION:  Unchanged bilateral severe hydronephroureter.  Hypertrophied prostate. Central gland protruding into the bladder with thickening of the bladder wall consistent with some degree of bladder outlet obstruction.  Bladder calculus.  CT Abdomen and Pelvis No Cont (10.07.20 @ 14:13) > IMPRESSION:  Bilateral moderate to severe hydroureteronephrosis. No evidence of ureteral obstruction.  Enlarged prostate with chronic changes of bladder outlet obstruction. Bladder stone and Gaston catheter in the bladder.

## 2020-10-24 NOTE — PROGRESS NOTE ADULT - PROBLEM SELECTOR PLAN 3
Garcia associated UTI / Acute pyelonephritis- Continue with Ceftriaxone. Follow up urine blood cultures. Garcia associated UTI / Acute pyelonephritis- Continue with Ceftriaxone. Urine cultures gram neg rods, blood cultures no growth to date.

## 2020-10-24 NOTE — PROVIDER CONTACT NOTE (CHANGE IN STATUS NOTIFICATION) - SITUATION
Abnormal EKG, pt c/o slight headache/chest pain, decrease in H/H from yesterday to today (9.1 to 6.5)

## 2020-10-24 NOTE — CONSULT NOTE ADULT - PROBLEM SELECTOR RECOMMENDATION 9
No overt signs of Gi bleeding .  Nausea likely from VENUS/ UTI/ obstruction   Will hold on endoscopy/ colposcopy for now   I will follow along and monitor CBC and evidence of overt bleeding  Ok for clears   Monitor CBC

## 2020-10-24 NOTE — PROGRESS NOTE ADULT - SUBJECTIVE AND OBJECTIVE BOX
Patient is a 75y old  Male who presents with a chief complaint of weakness (23 Oct 2020 15:31)      SUBJECTIVE / OVERNIGHT EVENTS: Drop in HB, s/p A.fib converted to sinus     T(C): 36.8 (10-24-20 @ 21:44), Max: 36.9 (10-24-20 @ 12:54)  HR: 76 (10-24-20 @ 21:44) (75 - 156)  BP: 129/68 (10-24-20 @ 21:44) (109/67 - 137/57)  RR: 18 (10-24-20 @ 21:44) (16 - 20)  SpO2: 98% (10-24-20 @ 21:44) (98% - 98%)    MEDICATIONS  (STANDING):  aspirin  chewable 81 milliGRAM(s) Oral daily  cefTRIAXone   IVPB 1000 milliGRAM(s) IV Intermittent every 24 hours  diltiazem    Tablet 30 milliGRAM(s) Oral every 6 hours  finasteride 5 milliGRAM(s) Oral daily  lactated ringers. 1000 milliLiter(s) (100 mL/Hr) IV Continuous <Continuous>  tamsulosin 0.4 milliGRAM(s) Oral at bedtime    MEDICATIONS  (PRN):  ondansetron Injectable 4 milliGRAM(s) IV Push every 6 hours PRN Nausea and/or Vomiting      PHYSICAL EXAM:  GENERAL: NAD, well-developed  HEAD:  Atraumatic, Normocephalic  EYES: EOMI, conjunctiva and sclera clear  NECK: Supple, No JVD  CHEST/LUNG: Clear to auscultation bilaterally; No wheeze  HEART: Regular rate and rhythm; No murmurs, rubs, or gallops  ABDOMEN: Soft, Nontender, Nondistended; Bowel sounds present  EXTREMITIES:  2+ Peripheral Pulses, No clubbing, cyanosis, or edema  PSYCH: AAOx3  NEUROLOGY: non-focal  SKIN: No rashes or lesions                            5.8    4.05  )-----------( 140      ( 24 Oct 2020 20:52 )             18.2       CARDIAC MARKERS ( 24 Oct 2020 04:57 )  x     / x     / 50 u/L / < 1.00 ng/mL / x              136|107|65<101  5.0|18|3.88  8.3,1.7,4.1  10-24 @ 04:57    RADIOLOGY & ADDITIONAL TESTS:    Imaging Personally Reviewed:    Consultant(s) Notes Reviewed:      Care Discussed with Consultants/Other Providers:

## 2020-10-24 NOTE — PROVIDER CONTACT NOTE (CHANGE IN STATUS NOTIFICATION) - ASSESSMENT
pt aaox4, resting in bed. pt c/o slight headache/chest pain but otherwise in no apparent distress. HR tachy.

## 2020-10-24 NOTE — PROGRESS NOTE ADULT - ASSESSMENT
75M with CAD/stent, hypertension, BPH, CKD, nephrolithiasis here after recent hospitalization for VENUS on CKD due to bladder outlet obstruction requiring catheter.  Now with sepsis secondary to CAUTI.  In the past UC+ enterococcus.  PCN-allergic but okay with cephalosporins    Sepsis secondary to CAUTI (only GNR now)  - f/u UC  - continue ceftriaxone    Leukocytosis  - trend wbc  - monitor for fever    VENUS  - per renal    I have discussed plan of care as detailed above with medicine    Please call the ID service [phone] with questions or concerns over the weekend

## 2020-10-25 ENCOUNTER — TRANSCRIPTION ENCOUNTER (OUTPATIENT)
Age: 75
End: 2020-10-25

## 2020-10-25 DIAGNOSIS — I48.91 UNSPECIFIED ATRIAL FIBRILLATION: ICD-10-CM

## 2020-10-25 LAB
-  AMIKACIN: SIGNIFICANT CHANGE UP
-  AMOXICILLIN/CLAVULANIC ACID: SIGNIFICANT CHANGE UP
-  AMPICILLIN/SULBACTAM: SIGNIFICANT CHANGE UP
-  AMPICILLIN: SIGNIFICANT CHANGE UP
-  AZTREONAM: SIGNIFICANT CHANGE UP
-  CEFAZOLIN: SIGNIFICANT CHANGE UP
-  CEFEPIME: SIGNIFICANT CHANGE UP
-  CEFOXITIN: SIGNIFICANT CHANGE UP
-  CEFTRIAXONE: SIGNIFICANT CHANGE UP
-  CIPROFLOXACIN: SIGNIFICANT CHANGE UP
-  ERTAPENEM: SIGNIFICANT CHANGE UP
-  GENTAMICIN: SIGNIFICANT CHANGE UP
-  IMIPENEM: SIGNIFICANT CHANGE UP
-  LEVOFLOXACIN: SIGNIFICANT CHANGE UP
-  MEROPENEM: SIGNIFICANT CHANGE UP
-  NITROFURANTOIN: SIGNIFICANT CHANGE UP
-  PIPERACILLIN/TAZOBACTAM: SIGNIFICANT CHANGE UP
-  TIGECYCLINE: SIGNIFICANT CHANGE UP
-  TOBRAMYCIN: SIGNIFICANT CHANGE UP
-  TRIMETHOPRIM/SULFAMETHOXAZOLE: SIGNIFICANT CHANGE UP
ANION GAP SERPL CALC-SCNC: 10 MMO/L — SIGNIFICANT CHANGE UP (ref 7–14)
BUN SERPL-MCNC: 56 MG/DL — HIGH (ref 7–23)
CALCIUM SERPL-MCNC: 8.5 MG/DL — SIGNIFICANT CHANGE UP (ref 8.4–10.5)
CHLORIDE SERPL-SCNC: 106 MMOL/L — SIGNIFICANT CHANGE UP (ref 98–107)
CO2 SERPL-SCNC: 21 MMOL/L — LOW (ref 22–31)
CREAT SERPL-MCNC: 3.78 MG/DL — HIGH (ref 0.5–1.3)
CULTURE RESULTS: SIGNIFICANT CHANGE UP
GLUCOSE SERPL-MCNC: 105 MG/DL — HIGH (ref 70–99)
HCT VFR BLD CALC: 25.4 % — LOW (ref 39–50)
HCT VFR BLD CALC: 27.3 % — LOW (ref 39–50)
HCT VFR BLD CALC: 27.3 % — LOW (ref 39–50)
HGB BLD-MCNC: 8 G/DL — LOW (ref 13–17)
HGB BLD-MCNC: 8.8 G/DL — LOW (ref 13–17)
HGB BLD-MCNC: 8.8 G/DL — LOW (ref 13–17)
MAGNESIUM SERPL-MCNC: 1.6 MG/DL — SIGNIFICANT CHANGE UP (ref 1.6–2.6)
MCHC RBC-ENTMCNC: 27.9 PG — SIGNIFICANT CHANGE UP (ref 27–34)
MCHC RBC-ENTMCNC: 28.1 PG — SIGNIFICANT CHANGE UP (ref 27–34)
MCHC RBC-ENTMCNC: 28.2 PG — SIGNIFICANT CHANGE UP (ref 27–34)
MCHC RBC-ENTMCNC: 31.5 % — LOW (ref 32–36)
MCHC RBC-ENTMCNC: 32.2 % — SIGNIFICANT CHANGE UP (ref 32–36)
MCHC RBC-ENTMCNC: 32.2 % — SIGNIFICANT CHANGE UP (ref 32–36)
MCV RBC AUTO: 86.7 FL — SIGNIFICANT CHANGE UP (ref 80–100)
MCV RBC AUTO: 87.5 FL — SIGNIFICANT CHANGE UP (ref 80–100)
MCV RBC AUTO: 89.1 FL — SIGNIFICANT CHANGE UP (ref 80–100)
METHOD TYPE: SIGNIFICANT CHANGE UP
NRBC # FLD: 0 K/UL — SIGNIFICANT CHANGE UP (ref 0–0)
OB PNL STL: NEGATIVE — SIGNIFICANT CHANGE UP
ORGANISM # SPEC MICROSCOPIC CNT: SIGNIFICANT CHANGE UP
ORGANISM # SPEC MICROSCOPIC CNT: SIGNIFICANT CHANGE UP
PHOSPHATE SERPL-MCNC: 3.8 MG/DL — SIGNIFICANT CHANGE UP (ref 2.5–4.5)
PLATELET # BLD AUTO: 166 K/UL — SIGNIFICANT CHANGE UP (ref 150–400)
PLATELET # BLD AUTO: 182 K/UL — SIGNIFICANT CHANGE UP (ref 150–400)
PLATELET # BLD AUTO: 182 K/UL — SIGNIFICANT CHANGE UP (ref 150–400)
PMV BLD: 10.1 FL — SIGNIFICANT CHANGE UP (ref 7–13)
PMV BLD: 10.2 FL — SIGNIFICANT CHANGE UP (ref 7–13)
PMV BLD: 10.8 FL — SIGNIFICANT CHANGE UP (ref 7–13)
POTASSIUM SERPL-MCNC: 5.1 MMOL/L — SIGNIFICANT CHANGE UP (ref 3.5–5.3)
POTASSIUM SERPL-SCNC: 5.1 MMOL/L — SIGNIFICANT CHANGE UP (ref 3.5–5.3)
RBC # BLD: 2.85 M/UL — LOW (ref 4.2–5.8)
RBC # BLD: 3.12 M/UL — LOW (ref 4.2–5.8)
RBC # BLD: 3.15 M/UL — LOW (ref 4.2–5.8)
RBC # FLD: 14.6 % — HIGH (ref 10.3–14.5)
RBC # FLD: 14.6 % — HIGH (ref 10.3–14.5)
RBC # FLD: 14.7 % — HIGH (ref 10.3–14.5)
SODIUM SERPL-SCNC: 137 MMOL/L — SIGNIFICANT CHANGE UP (ref 135–145)
SODIUM UR-SCNC: 102 MMOL/L — SIGNIFICANT CHANGE UP
SPECIMEN SOURCE: SIGNIFICANT CHANGE UP
VANCOMYCIN FLD-MCNC: 4.4 UG/ML — SIGNIFICANT CHANGE UP
WBC # BLD: 4.48 K/UL — SIGNIFICANT CHANGE UP (ref 3.8–10.5)
WBC # BLD: 5.12 K/UL — SIGNIFICANT CHANGE UP (ref 3.8–10.5)
WBC # BLD: 5.24 K/UL — SIGNIFICANT CHANGE UP (ref 3.8–10.5)
WBC # FLD AUTO: 4.48 K/UL — SIGNIFICANT CHANGE UP (ref 3.8–10.5)
WBC # FLD AUTO: 5.12 K/UL — SIGNIFICANT CHANGE UP (ref 3.8–10.5)
WBC # FLD AUTO: 5.24 K/UL — SIGNIFICANT CHANGE UP (ref 3.8–10.5)

## 2020-10-25 RX ORDER — CARVEDILOL PHOSPHATE 80 MG/1
6.25 CAPSULE, EXTENDED RELEASE ORAL EVERY 12 HOURS
Refills: 0 | Status: DISCONTINUED | OUTPATIENT
Start: 2020-10-25 | End: 2020-10-26

## 2020-10-25 RX ORDER — PANTOPRAZOLE SODIUM 20 MG/1
40 TABLET, DELAYED RELEASE ORAL
Refills: 0 | Status: DISCONTINUED | OUTPATIENT
Start: 2020-10-25 | End: 2020-11-05

## 2020-10-25 RX ORDER — FERROUS SULFATE 325(65) MG
325 TABLET ORAL DAILY
Refills: 0 | Status: DISCONTINUED | OUTPATIENT
Start: 2020-10-25 | End: 2020-11-05

## 2020-10-25 RX ORDER — METOPROLOL TARTRATE 50 MG
5 TABLET ORAL ONCE
Refills: 0 | Status: COMPLETED | OUTPATIENT
Start: 2020-10-25 | End: 2020-10-25

## 2020-10-25 RX ORDER — MAGNESIUM OXIDE 400 MG ORAL TABLET 241.3 MG
400 TABLET ORAL
Refills: 0 | Status: COMPLETED | OUTPATIENT
Start: 2020-10-25 | End: 2020-10-25

## 2020-10-25 RX ADMIN — CARVEDILOL PHOSPHATE 6.25 MILLIGRAM(S): 80 CAPSULE, EXTENDED RELEASE ORAL at 17:56

## 2020-10-25 RX ADMIN — Medication 30 MILLIGRAM(S): at 00:20

## 2020-10-25 RX ADMIN — TAMSULOSIN HYDROCHLORIDE 0.4 MILLIGRAM(S): 0.4 CAPSULE ORAL at 22:53

## 2020-10-25 RX ADMIN — Medication 30 MILLIGRAM(S): at 05:43

## 2020-10-25 RX ADMIN — Medication 325 MILLIGRAM(S): at 17:56

## 2020-10-25 RX ADMIN — FINASTERIDE 5 MILLIGRAM(S): 5 TABLET, FILM COATED ORAL at 12:23

## 2020-10-25 RX ADMIN — CEFTRIAXONE 100 MILLIGRAM(S): 500 INJECTION, POWDER, FOR SOLUTION INTRAMUSCULAR; INTRAVENOUS at 17:57

## 2020-10-25 RX ADMIN — Medication 5 MILLIGRAM(S): at 14:31

## 2020-10-25 RX ADMIN — SODIUM CHLORIDE 100 MILLILITER(S): 9 INJECTION, SOLUTION INTRAVENOUS at 08:26

## 2020-10-25 RX ADMIN — MAGNESIUM OXIDE 400 MG ORAL TABLET 400 MILLIGRAM(S): 241.3 TABLET ORAL at 08:26

## 2020-10-25 RX ADMIN — PANTOPRAZOLE SODIUM 40 MILLIGRAM(S): 20 TABLET, DELAYED RELEASE ORAL at 08:26

## 2020-10-25 RX ADMIN — MAGNESIUM OXIDE 400 MG ORAL TABLET 400 MILLIGRAM(S): 241.3 TABLET ORAL at 12:23

## 2020-10-25 RX ADMIN — MAGNESIUM OXIDE 400 MG ORAL TABLET 400 MILLIGRAM(S): 241.3 TABLET ORAL at 17:56

## 2020-10-25 NOTE — DISCHARGE NOTE PROVIDER - HOSPITAL COURSE
76yo M with pmhx cad, htn, bph p/w Sepsis from Acute Pyelonephritis     Anemia, unspecified type.    -anemia requiring transfusion of PRBCs  -No overt signs of bleeding   -s/p EGD with gastritis and nodule, no bleeding; fu pathology   -s/p Colonoscopy w/diverticulosis and polypectomy; fu pathology   -continue protonix qd   -no gi objection to asa per cardiology recs  -high fiber diet.   -asa restarted 10/29    Acute pyelonephritis.    -Gaston associated UTI / Acute pyelonephritis  -ID consulted  - Continue with Ceftriaxone. Urine cultures show E.coli sensitive to CTX  - Blood cultures with NGTD  -repeat renal US:Mild right hydronephrosis, mildly improved, Moderate left hydronephrosis, unchanged.  -Urology, Renal consults following  -Continue with gaston, Flomax. Continue with Finasteride.    Elevated PSA  -PSA of 18.2 on 10/26.  -Level is likely inaccurate while acutely ill.  -Plan to recheck as outpatient, and further work-up will be obtained as outpatient.  -Patient may follow-up with Dr. aLm at the [facility], [phone]     VENUS (acute kidney injury) On CKD  - improving Creatinine stable around 3, monitor for now.   - avoid nephrotoxins  -renal consulted during admission    Essential hypertension  -Continue with Metoprolol.     Coronary artery disease involving native coronary artery of native heart without angina pectoris  -Continue with Aspirin.    Benign prostatic hyperplasia with urinary frequency  -Continue with Flomax and Finasteride    Atrial Fibrillation  -transient a-fib with RVR to 170  -received cardizem inpatient  -Continue Coreg as ordered (recently uptitrated).   -Given his recurrent anemia of unclear etiology, I would hold off on anticoagulation at this time.  -Echo from earlier this month reviewed- preserved LVEF with no hemodynamically significant valvular disease.  -No cardiac objection to low risk EGD/colonoscopy    On ____ patient medically clear for discharge home with home care by _______ 76yo M with pmhx cad, htn, bph p/w Sepsis from Acute Pyelonephritis     Anemia, unspecified type.    -anemia requiring transfusion of PRBCs  -No overt signs of bleeding   -s/p EGD with gastritis and nodule, no bleeding; fu pathology   -s/p Colonoscopy w/diverticulosis and polypectomy; fu pathology   -continue protonix qd   -no gi objection to asa per cardiology recs  -high fiber diet.   -asa restarted 10/29    Acute pyelonephritis.    -Gaston associated UTI / Acute pyelonephritis  -ID consulted  - Continue with Ceftriaxone. Urine cultures show E.coli sensitive to CTX  - Blood cultures with NGTD  -repeat renal US:Mild right hydronephrosis, mildly improved, Moderate left hydronephrosis, unchanged.  -Urology, Renal consults following  -Continue with gaston, Flomax. Continue with Finasteride.    Elevated PSA  -PSA of 18.2 on 10/26.  -Level is likely inaccurate while acutely ill.  -Plan to recheck as outpatient, and further work-up will be obtained as outpatient.  -Patient may follow-up with Dr. Lam at the [facility], [phone]     VENUS (acute kidney injury) On CKD  - improving Creatinine stable around 3, monitor for now.   - avoid nephrotoxins  -renal consulted during admission    Essential hypertension  -Continue with Metoprolol.     Coronary artery disease involving native coronary artery of native heart without angina pectoris  -Continue with Aspirin.    Benign prostatic hyperplasia with urinary frequency  -Continue with Flomax and Finasteride    Atrial Fibrillation  -transient a-fib with RVR to 170  -received cardizem inpatient  -Continue Coreg as ordered (recently uptitrated).   -Given his recurrent anemia of unclear etiology, I would hold off on anticoagulation at this time.  -Echo from earlier this month reviewed- preserved LVEF with no hemodynamically significant valvular disease.  -No cardiac objection to low risk EGD/colonoscopy    Patient seen by physical therapy and determined to have no PT needs.    On 10/30/2020 patient medically clear for discharge home with home care by Dr. Armenta 75M with cad/stent, htn, bph.  Recently hospitalized 10/7/2020 - 10/20/2020 for VENUS on CKD due to obstructive uropathy.  He had also changed his ANTONETTE medications from flomax to ditropan.  During hospitalization, imaging showed obstruction, gaston placed, drained 500cc, CT with bladder stone.  He had a low grade temp, BC negative and he was discharged.  Returns 2 days later with n/v, cloudy urine.  Isolated fever 101.4, mild leukocytosis, pyuria. Pt was started on ceftriaxone for urosepsis, urine cultres grew E.Coli . Pt also with VENUS and hydronephrosis. Pt then developed rapid atrial fibrillation and was started on diltiazem, them switched to carvedilol for heart rate control. Pt also with anemia, and prior to starting anticoagulation pt underwent an EGD which showed  gastritis and nodule, no bleeding. Apixaban was then started. Pt then developed orthostatic hypotension for which carvedilol dose was decreased, and later stopped completely. Pt then went back into rapid Afib/Aflutter, and metprolol tartrate was started. Flomax was stopped. Pt is now medicallys table for discharge home with urology and cardiology follow up. 75M with cad/stent, htn, bph.  Recently hospitalized 10/7/2020 - 10/20/2020 for VENUS on CKD due to obstructive uropathy.  He had also changed his ANTONETTE medications from flomax to ditropan.  During hospitalization, imaging showed obstruction, gaston placed, drained 500cc, CT with bladder stone.  He had a low grade temp, BC negative and he was discharged.  Returns 2 days later with n/v, cloudy urine.  Isolated fever 101.4, mild leukocytosis, pyuria. Pt was started on ceftriaxone for urosepsis, urine cultres grew E.Coli . Pt also with VENUS and hydronephrosis. Pt then developed rapid atrial fibrillation and was started on diltiazem, them switched to carvedilol for heart rate control. Pt also with anemia, and prior to starting anticoagulation pt underwent an EGD which showed  gastritis and nodule, no bleeding. Apixaban was then started. Pt then developed orthostatic hypotension for which carvedilol dose was decreased, and later stopped completely. Pt then went back into rapid Afib/Aflutter, and metprolol tartrate was started. Flomax was stopped. Pt completed a course of ceftriaxone. Pt is now medicallys table for discharge home with urology and cardiology follow up.

## 2020-10-25 NOTE — DISCHARGE NOTE PROVIDER - NSDCMRMEDTOKEN_GEN_ALL_CORE_FT
aspirin 81 mg oral tablet, chewable: 1 tab(s) orally once a day  finasteride 5 mg oral tablet: 1 tab(s) orally once a day  labetalol 100 mg oral tablet: 1 tab(s) orally 2 times a day  Physical Therapy:   tamsulosin 0.4 mg oral capsule: 1 cap(s) orally once a day (at bedtime)   aspirin 81 mg oral tablet, chewable: 1 tab(s) orally once a day  carvedilol 12.5 mg oral tablet: 1 tab(s) orally every 12 hours  ferrous sulfate 325 mg (65 mg elemental iron) oral tablet: 1 tab(s) orally once a day  finasteride 5 mg oral tablet: 1 tab(s) orally once a day  pantoprazole 40 mg oral delayed release tablet: 1 tab(s) orally once a day (before a meal)  tamsulosin 0.4 mg oral capsule: 1 cap(s) orally once a day (at bedtime)   apixaban 5 mg oral tablet: 1 tab(s) orally 2 times a day  aspirin 81 mg oral tablet, chewable: 1 tab(s) orally once a day  carvedilol 12.5 mg oral tablet: 1 tab(s) orally every 12 hours  ferrous sulfate 325 mg (65 mg elemental iron) oral tablet: 1 tab(s) orally once a day  finasteride 5 mg oral tablet: 1 tab(s) orally once a day  metoprolol tartrate 50 mg oral tablet: 1 tab(s) orally 2 times a day  pantoprazole 40 mg oral delayed release tablet: 1 tab(s) orally once a day (before a meal)  sodium bicarbonate 650 mg oral tablet: 1 tab(s) orally 3 times a day   apixaban 5 mg oral tablet: 1 tab(s) orally 2 times a day  aspirin 81 mg oral tablet, chewable: 1 tab(s) orally once a day  carvedilol 12.5 mg oral tablet: 1 tab(s) orally every 12 hours  ferrous sulfate 325 mg (65 mg elemental iron) oral tablet: 1 tab(s) orally once a day  finasteride 5 mg oral tablet: 1 tab(s) orally once a day  metoprolol tartrate 50 mg oral tablet: 1 tab(s) orally 2 times a day  pantoprazole 40 mg oral delayed release tablet: 1 tab(s) orally once a day (before a meal)  sodium bicarbonate 650 mg oral tablet: 2 tab(s) orally 3 times a day   apixaban 5 mg oral tablet: 1 tab(s) orally 2 times a day  aspirin 81 mg oral tablet, chewable: 1 tab(s) orally once a day  ferrous sulfate 325 mg (65 mg elemental iron) oral tablet: 1 tab(s) orally once a day  finasteride 5 mg oral tablet: 1 tab(s) orally once a day  metoprolol tartrate 50 mg oral tablet: 1 tab(s) orally 2 times a day    Attn pharmacy: please disregard previousl carvedilol dose   pantoprazole 40 mg oral delayed release tablet: 1 tab(s) orally once a day (before a meal)  sodium bicarbonate 650 mg oral tablet: 2 tab(s) orally 3 times a day

## 2020-10-25 NOTE — DISCHARGE NOTE PROVIDER - NSDCCAREPROVSEEN_GEN_ALL_CORE_FT
Chery Armenta Chery Armenta Interventional Radiology, Team  [facility] Medicine ACP, Team  Pérez Bustillos

## 2020-10-25 NOTE — DISCHARGE NOTE PROVIDER - NSDCCPCAREPLAN_GEN_ALL_CORE_FT
PRINCIPAL DISCHARGE DIAGNOSIS  Diagnosis: Sepsis  Assessment and Plan of Treatment:        PRINCIPAL DISCHARGE DIAGNOSIS  Diagnosis: Sepsis  Assessment and Plan of Treatment: You were admitted with a urinary tract infection.  You were treated with antibiotics.   Follow up with [facility] for further monitoring.  You will need to have your PSA level rechecked as an outpatient as your PSA level was 18.2 which is high.      SECONDARY DISCHARGE DIAGNOSES  Diagnosis: Coronary artery disease involving native coronary artery of native heart without angina pectoris  Assessment and Plan of Treatment: Coronary artery disease involving native coronary artery of native heart without angina pectoris Continue aspirin , do not stop unless instructed by your physician.  Continue low salt, fat, cholesterol and carbohydrate diet. Follow up with cardiologist and primary care physician's routine appointment.    Diagnosis: Benign prostatic hyperplasia with urinary frequency  Assessment and Plan of Treatment: Benign prostatic hyperplasia with urinary frequency Continue medications, follow up with urologist    Diagnosis: Atrial fibrillation  Assessment and Plan of Treatment: You had some atrial fibrillation during this admission.  Continue to take your beta blocker as ordered.  It is very important that you take this medication and not miss any doses.  Please follo wup with cardiology for further monitoring. You were not started on a blood thinner due to anemia.     PRINCIPAL DISCHARGE DIAGNOSIS  Diagnosis: Sepsis  Assessment and Plan of Treatment: You were admitted with a urinary tract infection.  You were treated with antibiotics.   Follow up with [facility] for further monitoring.  You will need to have your PSA level rechecked as an outpatient as your PSA level was 18.2 which is high.      SECONDARY DISCHARGE DIAGNOSES  Diagnosis: Coronary artery disease involving native coronary artery of native heart without angina pectoris  Assessment and Plan of Treatment: Coronary artery disease involving native coronary artery of native heart without angina pectoris Continue aspirin , do not stop unless instructed by your physician.  Continue low salt, fat, cholesterol and carbohydrate diet. Follow up with cardiologist and primary care physician's routine appointment.    Diagnosis: Benign prostatic hyperplasia with urinary frequency  Assessment and Plan of Treatment: Benign prostatic hyperplasia with urinary frequency Continue medications, follow up with urologist    Diagnosis: Anemia, unspecified type  Assessment and Plan of Treatment: Follow-up with your outpatient provider for further care/recommendations. Monitor for signs/symptoms indicating worsening of disease, such as, easy bleeding/bruising, pale skin, fatigue, dizziness, increased heart rate, or chest pain., unspecified type Recieved 2 units of blood.  Your stool was tested for blood and was negative.  You had an EGD and colonoscopy which did not show any active bleeding.  Your hemoglobin on discharge was 9.5.  Follow up with your primary care provider and GI Dr. Pena for continued monitoring and treatment.    Diagnosis: Atrial fibrillation  Assessment and Plan of Treatment: You had some atrial fibrillation during this admission.  Continue to take your beta blocker as ordered.  It is very important that you take this medication and not miss any doses.  Please follo wup with cardiology for further monitoring. You were not started on a blood thinner due to anemia.     PRINCIPAL DISCHARGE DIAGNOSIS  Diagnosis: E-coli UTI  Assessment and Plan of Treatment: You completed a course of antibiotics.      SECONDARY DISCHARGE DIAGNOSES  Diagnosis: Anemia, unspecified type  Assessment and Plan of Treatment: Follow-up with your outpatient provider for further care/recommendations. Monitor for signs/symptoms indicating worsening of disease, such as, easy bleeding/bruising, pale skin, fatigue, dizziness, increased heart rate, or chest pain., unspecified type Recieved 2 units of blood.  Your stool was tested for blood and was negative.  You had an EGD and colonoscopy which did not show any active bleeding.  Your hemoglobin on discharge was 9.5.  Follow up with your primary care provider and GI Dr. Pena for continued monitoring and treatment.    Diagnosis: Coronary artery disease involving native coronary artery of native heart without angina pectoris  Assessment and Plan of Treatment: Coronary artery disease involving native coronary artery of native heart without angina pectoris Continue aspirin , do not stop unless instructed by your physician.  Continue low salt, fat, cholesterol and carbohydrate diet. Follow up with cardiologist and primary care physician's routine appointment.    Diagnosis: Benign prostatic hyperplasia with urinary frequency  Assessment and Plan of Treatment: Benign prostatic hyperplasia with urinary frequency Continue medications, follow up with urologist for trial of void. Flomax was stopped due to orthostatic hypotension. You will need a repeat prostate level    Diagnosis: Atrial fibrillation  Assessment and Plan of Treatment: You had some atrial fibrillation during this admission.  Continue to take your beta blocker (metoprolol)as ordered. You were also started on a blood thinner medication-Eliquis.  It is very important that you take this medication and not miss any doses.  Please follo wup with cardiology for further monitoring.

## 2020-10-25 NOTE — CHART NOTE - NSCHARTNOTEFT_GEN_A_CORE
pt w/ sinus tach on tele to 150 evaluated patient at bedside asymptomatic denies dizziness/chest pain; bp stable, lopressor 5mg IVP x1 given, HR resolved to 75 NS. Dr Miller made aware, cardizem changed to coreg today . cont to monitor     d/w Attending and RN pt w/ sinus tach on tele to 150 evaluated patient at bedside asymptomatic denies dizziness/chest pain; bp stable, metoprolol 5mg IVP x1 given, HR resolved to 75 NS. Dr Miller made aware, cardizem changed to coreg today . cont to monitor     d/w Attending and RN

## 2020-10-25 NOTE — DISCHARGE NOTE PROVIDER - CARE PROVIDERS DIRECT ADDRESSES
,DirectAddress_Unknown,DirectAddress_Unknown,[email or web address]),DirectAddress_Unknown

## 2020-10-25 NOTE — PROGRESS NOTE ADULT - SUBJECTIVE AND OBJECTIVE BOX
Follow up note    Patient seen and examined at bedside. No chest pain/sob    VITALS:  T(F): 98.3 (10-25-20 @ 12:16), Max: 98.9 (10-25-20 @ 00:15)  HR: 68 (10-25-20 @ 12:16)  BP: 142/71 (10-25-20 @ 12:16)  RR: 17 (10-25-20 @ 12:16)  SpO2: 96% (10-25-20 @ 12:16)  Wt(kg): --    10-24 @ 07:01  -  10-25 @ 07:00  --------------------------------------------------------  IN: 1100 mL / OUT: 1950 mL / NET: -850 mL          PHYSICAL EXAM:  Constitutional: NAD  Neck: No JVD  Respiratory: CTAB, no wheezes, rales or rhonchi  Cardiovascular: S1, S2, RRR  Gastrointestinal: BS+, soft, NT/ND  Extremities: No peripheral edema    Hospital Medications:   MEDICATIONS  (STANDING):  carvedilol 6.25 milliGRAM(s) Oral every 12 hours  cefTRIAXone   IVPB 1000 milliGRAM(s) IV Intermittent every 24 hours  finasteride 5 milliGRAM(s) Oral daily  lactated ringers. 1000 milliLiter(s) (100 mL/Hr) IV Continuous <Continuous>  magnesium oxide 400 milliGRAM(s) Oral three times a day with meals  pantoprazole    Tablet 40 milliGRAM(s) Oral before breakfast  tamsulosin 0.4 milliGRAM(s) Oral at bedtime      LABS:  10-25    137  |  106  |  56<H>  ----------------------------<  105<H>  5.1   |  21<L>  |  3.78<H>    Ca    8.5      25 Oct 2020 06:30  Phos  3.8     10-25  Mg     1.6     10-25      Creatinine Trend: 3.78 <--, 3.88 <--, 4.83 <--, 4.22 <--  Phosphorus Level, Serum: 3.8 mg/dL (10-25 @ 06:30)                              8.8    5.12  )-----------( 182      ( 25 Oct 2020 11:56 )             27.3     Urine Studies:  Creatinine, Random Urine: 55.10 mg/dL (10-23 @ 20:05)  Osmolality, Random Urine: 421 mosmo/kg (10-23 @ 20:05)    Creatinine, Random Urine: 55.10 mg/dL (10-23 @ 20:05)  Osmolality, Random Urine: 421 mosmo/kg (10-23 @ 20:05)    Urinalysis - [10-23-20 @ 20:05]      Color COLORLESS / Appearance Lt TURBID / SG 1.015 / pH 6.5      Gluc NEGATIVE / Ketone NEGATIVE  / Bili NEGATIVE / Urobili NORMAL       Blood LARGE / Protein 70 / Leuk Est LARGE / Nitrite NEGATIVE      RBC >50 / WBC 11-25 / Hyaline 1+ / Gran  / Sq Epi OCC / Non Sq Epi  / Bacteria FEW    Urine Creatinine 55.10      [10-23-20 @ 20:05]  Urine Osmolality 421      [10-23-20 @ 20:05]    Iron 22, TIBC 170, %sat --      [10-24-20 @ 04:57]  Ferritin 587.2      [10-24-20 @ 04:57]  TSH 1.01      [10-08-20 @ 02:55]    HCV 0.13, Nonreact      [10-08-20 @ 10:59]      RADIOLOGY & ADDITIONAL STUDIES:   Follow up note  DOS: 10/25/2020  Patient seen and examined at bedside. No chest pain/sob    VITALS:  T(F): 98.3 (10-25-20 @ 12:16), Max: 98.9 (10-25-20 @ 00:15)  HR: 68 (10-25-20 @ 12:16)  BP: 142/71 (10-25-20 @ 12:16)  RR: 17 (10-25-20 @ 12:16)  SpO2: 96% (10-25-20 @ 12:16)  Wt(kg): --    10-24 @ 07:01  -  10-25 @ 07:00  --------------------------------------------------------  IN: 1100 mL / OUT: 1950 mL / NET: -850 mL          PHYSICAL EXAM:  Constitutional: NAD  Neck: No JVD  Respiratory: CTAB, no wheezes, rales or rhonchi  Cardiovascular: S1, S2, RRR  Gastrointestinal: BS+, soft, NT/ND  Extremities: No peripheral edema    Hospital Medications:   MEDICATIONS  (STANDING):  carvedilol 6.25 milliGRAM(s) Oral every 12 hours  cefTRIAXone   IVPB 1000 milliGRAM(s) IV Intermittent every 24 hours  finasteride 5 milliGRAM(s) Oral daily  lactated ringers. 1000 milliLiter(s) (100 mL/Hr) IV Continuous <Continuous>  magnesium oxide 400 milliGRAM(s) Oral three times a day with meals  pantoprazole    Tablet 40 milliGRAM(s) Oral before breakfast  tamsulosin 0.4 milliGRAM(s) Oral at bedtime      LABS:  10-25    137  |  106  |  56<H>  ----------------------------<  105<H>  5.1   |  21<L>  |  3.78<H>    Ca    8.5      25 Oct 2020 06:30  Phos  3.8     10-25  Mg     1.6     10-25      Creatinine Trend: 3.78 <--, 3.88 <--, 4.83 <--, 4.22 <--  Phosphorus Level, Serum: 3.8 mg/dL (10-25 @ 06:30)                              8.8    5.12  )-----------( 182      ( 25 Oct 2020 11:56 )             27.3     Urine Studies:  Creatinine, Random Urine: 55.10 mg/dL (10-23 @ 20:05)  Osmolality, Random Urine: 421 mosmo/kg (10-23 @ 20:05)    Creatinine, Random Urine: 55.10 mg/dL (10-23 @ 20:05)  Osmolality, Random Urine: 421 mosmo/kg (10-23 @ 20:05)    Urinalysis - [10-23-20 @ 20:05]      Color COLORLESS / Appearance Lt TURBID / SG 1.015 / pH 6.5      Gluc NEGATIVE / Ketone NEGATIVE  / Bili NEGATIVE / Urobili NORMAL       Blood LARGE / Protein 70 / Leuk Est LARGE / Nitrite NEGATIVE      RBC >50 / WBC 11-25 / Hyaline 1+ / Gran  / Sq Epi OCC / Non Sq Epi  / Bacteria FEW    Urine Creatinine 55.10      [10-23-20 @ 20:05]  Urine Osmolality 421      [10-23-20 @ 20:05]    Iron 22, TIBC 170, %sat --      [10-24-20 @ 04:57]  Ferritin 587.2      [10-24-20 @ 04:57]  TSH 1.01      [10-08-20 @ 02:55]    HCV 0.13, Nonreact      [10-08-20 @ 10:59]      RADIOLOGY & ADDITIONAL STUDIES:

## 2020-10-25 NOTE — DISCHARGE NOTE PROVIDER - CARE PROVIDER_API CALL
Jacob Aviles  Internal Medicine  [address] [place] [address]  Phone: [phone]  Fax: [phone]  Follow Up Time: 1 week    Lionel Miller  INTERNAL MEDICINE  [address] [place] [address]  Phone: [phone]  Fax: [phone]  Follow Up Time: 1 week    Milton Lam  UROLOGY  [address], [address]  [place] [address]  Phone: [phone]  Fax: [phone]  Follow Up Time: 1 week    Pérez Bustillos (DO)  Nephrology  [address] [place] [address]  Phone: [phone]  Fax: [phone]  Follow Up Time: 1 week

## 2020-10-25 NOTE — PROGRESS NOTE ADULT - PROBLEM SELECTOR PLAN 3
Garcia associated UTI / Acute pyelonephritis- Continue with Ceftriaxone. Urine cultures shows E.coli, blood cultures no growth to date.

## 2020-10-25 NOTE — CONSULT NOTE ADULT - SUBJECTIVE AND OBJECTIVE BOX
Cardiovascular Disease Initial Evaluation    CHIEF COMPLAINT:    HISTORY OF PRESENT ILLNESS:  This is a 75 year old man with CAD s/p PCI several years ago, HTN, and BPH who presented to [facility] on 10/22/2020 with nausea, vomiting, and cloudy urine.   Of note, Mr. Raymundo was recently admitted to [facility] for VENUS in the setting of urinary retention and gaston was placed, which he was discharged with.  On that admission, he had a-fib in the ED. AC was not started due to the transient a-fib and anemia.   Currently he denies chest pain or SOB.  Cardiology is consulted for recurrent a-fib.       Allergies  penicillin (Rash)      MEDICATIONS:  aspirin  chewable 81 milliGRAM(s) Oral daily  diltiazem    Tablet 30 milliGRAM(s) Oral every 6 hours  tamsulosin 0.4 milliGRAM(s) Oral at bedtime  cefTRIAXone   IVPB 1000 milliGRAM(s) IV Intermittent every 24 hours  ondansetron Injectable 4 milliGRAM(s) IV Push every 6 hours PRN    pantoprazole    Tablet 40 milliGRAM(s) Oral before breakfast    finasteride 5 milliGRAM(s) Oral daily    lactated ringers. 1000 milliLiter(s) IV Continuous <Continuous>  magnesium oxide 400 milliGRAM(s) Oral three times a day with meals      PAST MEDICAL & SURGICAL HISTORY:  HLD (hyperlipidemia)    CAD (coronary artery disease)    HTN (hypertension)        FAMILY HISTORY:  No pertinent family history in first degree relatives        SOCIAL HISTORY:    The patient is a nonsmoker       REVIEW OF SYSTEMS:  See HPI, otherwise complete 14 point review of systems negative      PHYSICAL EXAM:  T(C): 37 (10-25-20 @ 05:41), Max: 37.2 (10-25-20 @ 00:15)  HR: 73 (10-25-20 @ 05:41) (73 - 156)  BP: 149/84 (10-25-20 @ 05:41) (109/67 - 149/84)  RR: 17 (10-25-20 @ 05:41) (16 - 20)  SpO2: 98% (10-25-20 @ 05:41) (98% - 98%)  Wt(kg): --  I&O's Summary    24 Oct 2020 07:01  -  25 Oct 2020 07:00  --------------------------------------------------------  IN: 1100 mL / OUT: 1950 mL / NET: -850 mL        Appearance: No Acute Distress; resting comfortably  HEENT:  Normal oral mucosa, PERRL, EOMI	  Cardiovascular: Normal S1 S2, No JVD, No murmurs/rubs/gallops  Respiratory: Normal respiratory effort; Lungs clear to auscultation bilaterally  Gastrointestinal:  Soft, Non-tender, + BS	  Skin: No rashes, No ecchymoses, No cyanosis	  Neurologic: Non-focal; no weakness  Extremities: No clubbing, cyanosis or edema  Vascular: Peripheral pulses palpable 2+ bilaterally  Psychiatry: A & O x 3, Mood & affect appropriate    Laboratory Data:	 	    CBC Full  -  ( 25 Oct 2020 06:30 )  WBC Count : 5.24 K/uL  Hemoglobin : 8.8 g/dL  Hematocrit : 27.3 %  Platelet Count - Automated : 182 K/uL  Mean Cell Volume : 87.5 fL  Mean Cell Hemoglobin : 28.2 pg  Mean Cell Hemoglobin Concentration : 32.2 %  Auto Neutrophil # : x  Auto Lymphocyte # : x  Auto Monocyte # : x  Auto Eosinophil # : x  Auto Basophil # : x  Auto Neutrophil % : x  Auto Lymphocyte % : x  Auto Monocyte % : x  Auto Eosinophil % : x  Auto Basophil % : x    10-25    137  |  106  |  56<H>  ----------------------------<  105<H>  5.1   |  21<L>  |  3.78<H>  10-24    136  |  107  |  65<H>  ----------------------------<  101<H>  5.0   |  18<L>  |  3.88<H>    Ca    8.5      25 Oct 2020 06:30  Ca    8.3<L>      24 Oct 2020 04:57  Phos  3.8     10-25  Phos  4.1     10-24  Mg     1.6     10-25  Mg     1.7     10-24        CKMB: < 1.00 ng/mL (10-24 @ 04:57)    CKMB Relative Index: Test not performed (10-24 @ 04:57)      Interpretation of Telemetry: Sinus	    ECG:  	A-fib with RVR    Assessment: 75 year old man with CAD s/p PCI several years ago, HTN, and BPH presents with UTI and a-fib with RVR    Plan of Care:    #A-fib with RVR-  Driven by profound anemia and UTI.  Mr. Raymundo spontaneously converted to sinus rhythm on 10/24 in the afternoon.   I will stop diltiazem and start Coreg instead.   Given his recurrent anemia of unclear etiology, I would hold off on anticoagulation at this time.  Echo from earlier this month at [place] reviewed.     #HTN-  BP acceptable.  I will start Coreg to help maintain sinus rhythm.     #CAD s/p PCI-  PCI several years ago.   I will hold ASA until the source of anemia is better elucidated.    #ACP (advance care planning)-  Advanced care planning was discussed with Mr. Raymundo. Cardiac findings were discussed and all questions were answered.       52 minutes spent on total encounter; more than 50% of the visit was spent counseling and/or coordinating care by the attending physician.   	  Lionel Miller MD [facility]  Cardiovascular Diseases  [phone]

## 2020-10-25 NOTE — PROGRESS NOTE ADULT - PROBLEM SELECTOR PLAN 2
Gaston associated UTI / Acute pyelonephritis- Continue with Ceftriaxone. Urine cultures show E.coli, blood cultures show no growth to date.   Urology, Renal consults following. Bilateral moderate hydroureteronephrosis- last sono, post obstructive Uropathy. Continue with gaston, Flomax. Continue with Finasteride.

## 2020-10-25 NOTE — PROGRESS NOTE ADULT - PROBLEM SELECTOR PLAN 4
On CKD- Creatinine stable around 4. Monitor for now.   Continue with gentle hydration.   Renal following.

## 2020-10-25 NOTE — PROGRESS NOTE ADULT - SUBJECTIVE AND OBJECTIVE BOX
Patient is a 75y old  Male who presents with a chief complaint of weakness (23 Oct 2020 15:31)      SUBJECTIVE / OVERNIGHT EVENTS: No events overnight.   Patient feels ok.     T(C): 36.4 (10-25-20 @ 14:38), Max: 36.8 (10-25-20 @ 12:16)  HR: 67 (10-25-20 @ 17:55) (67 - 147)  BP: 155/97 (10-25-20 @ 17:55) (127/86 - 155/97)  RR: 16 (10-25-20 @ 14:38) (16 - 17)  SpO2: 98% (10-25-20 @ 14:38) (96% - 100%)    MEDICATIONS  (STANDING):  carvedilol 6.25 milliGRAM(s) Oral every 12 hours  cefTRIAXone   IVPB 1000 milliGRAM(s) IV Intermittent every 24 hours  ferrous    sulfate 325 milliGRAM(s) Oral daily  finasteride 5 milliGRAM(s) Oral daily  lactated ringers. 1000 milliLiter(s) (100 mL/Hr) IV Continuous <Continuous>  pantoprazole    Tablet 40 milliGRAM(s) Oral before breakfast  tamsulosin 0.4 milliGRAM(s) Oral at bedtime    MEDICATIONS  (PRN):  ondansetron Injectable 4 milliGRAM(s) IV Push every 6 hours PRN Nausea and/or Vomiting      PHYSICAL EXAM:  GENERAL: NAD, well-developed  HEAD:  Atraumatic, Normocephalic  EYES: EOMI, conjunctiva and sclera clear  NECK: Supple, No JVD  CHEST/LUNG: Clear to auscultation bilaterally; No wheeze  HEART: Regular rate and rhythm; No murmurs, rubs, or gallops  ABDOMEN: Soft, Nontender, Nondistended; Bowel sounds present  EXTREMITIES:  2+ Peripheral Pulses, No clubbing, cyanosis, or edema  PSYCH: AAOx3  NEUROLOGY: non-focal  SKIN: No rashes or lesions                            8.0    4.48  )-----------( 166      ( 25 Oct 2020 18:30 )             25.4       CARDIAC MARKERS ( 24 Oct 2020 04:57 )  x     / x     / 50 u/L / < 1.00 ng/mL / x              137|106|56<105  5.1|21|3.78  8.5,1.6,3.8  10-25 @ 06:30      CAPILLARY BLOOD GLUCOSE                RADIOLOGY & ADDITIONAL TESTS:    Imaging Personally Reviewed:    Consultant(s) Notes Reviewed:      Care Discussed with Consultants/Other Providers:

## 2020-10-25 NOTE — PROGRESS NOTE ADULT - SUBJECTIVE AND OBJECTIVE BOX
Summary:   75y  Male      Subjective:   Event noted    Objective:    MEDICATIONS  (STANDING):  carvedilol 6.25 milliGRAM(s) Oral every 12 hours  cefTRIAXone   IVPB 1000 milliGRAM(s) IV Intermittent every 24 hours  ferrous    sulfate 325 milliGRAM(s) Oral daily  finasteride 5 milliGRAM(s) Oral daily  lactated ringers. 1000 milliLiter(s) (100 mL/Hr) IV Continuous <Continuous>  pantoprazole    Tablet 40 milliGRAM(s) Oral before breakfast  tamsulosin 0.4 milliGRAM(s) Oral at bedtime    MEDICATIONS  (PRN):  ondansetron Injectable 4 milliGRAM(s) IV Push every 6 hours PRN Nausea and/or Vomiting              Vital Signs Last 24 Hrs  T(C): 36.4 (25 Oct 2020 14:38), Max: 37.2 (25 Oct 2020 00:15)  T(F): 97.5 (25 Oct 2020 14:38), Max: 98.9 (25 Oct 2020 00:15)  HR: 67 (25 Oct 2020 17:55) (67 - 147)  BP: 155/97 (25 Oct 2020 17:55) (127/86 - 155/97)  BP(mean): --  RR: 16 (25 Oct 2020 14:38) (16 - 17)  SpO2: 98% (25 Oct 2020 14:38) (96% - 100%)      General:  Well developed, well nourished, alert and active, no pallor, NAD.  HEENT:    Normal appearance of conjunctiva, ears, nose, lips, oropharynx, and oral mucosa, anicteric.  Neck:  No masses, no asymmetry.  Lymph Nodes:  No lymphadenopathy.   Cardiovascular:  RRR normal S1/S2, no murmur.  Respiratory:  CTA B/L, normal respiratory effort.   Abdominal:   soft, no masses or tenderness, normoactive BS, NT/ND, no HSM.  Extremities:   No clubbing or cyanosis, normal capillary refill, no edema.   Skin:   No rash, jaundice, lesions, eczema.   Musculoskeletal:  No joint swelling, erythema or tenderness.   Neuro: No focal deficits.   Other:       LABS:                        8.0    4.48  )-----------( 166      ( 25 Oct 2020 18:30 )             25.4     10-25    137  |  106  |  56<H>  ----------------------------<  105<H>  5.1   |  21<L>  |  3.78<H>    Ca    8.5      25 Oct 2020 06:30  Phos  3.8     10-25  Mg     1.6     10-25            RADIOLOGY & ADDITIONAL TESTS:

## 2020-10-25 NOTE — DISCHARGE NOTE PROVIDER - PROVIDER TOKENS
PROVIDER:[TOKEN:[[id number]],FOLLOWUP:[1 week]],PROVIDER:[TOKEN:[[id number]],FOLLOWUP:[1 week]],PROVIDER:[TOKEN:[[id number]],FOLLOWUP:[1 week]],PROVIDER:[TOKEN:[[id number]],FOLLOWUP:[1 week]]

## 2020-10-25 NOTE — DISCHARGE NOTE PROVIDER - NSDCHHNEEDSERVICE_GEN_ALL_CORE
Medication teaching and assessment/Rehabilitation services/Teaching and training Teaching and training/Medication teaching and assessment

## 2020-10-25 NOTE — PROGRESS NOTE ADULT - PROBLEM SELECTOR PLAN 1
No overt signs of bleeding   Consider EGD and colonoscopy on an elective basis when acute issues have resolved.

## 2020-10-25 NOTE — PROGRESS NOTE ADULT - ASSESSMENT
76yo M with pmhx cad, htn, bph, recent admission to [facility] for CAREN severe b/l hydro s/p gaston and was discharged home 2 days ago with gaston in place p/w nausea, vomiting cloudy urine and weakness  nephrology consulted for renal failure    Renal failure  recent CAREN sec to obstructive uropathy, scr peaked >9 and had improved and stable ~4.1 upon discharge  scr peaked 4.8 on this admission, stable today  Caren likely sec to prerenal vs obstruction   Gaston in place and is non oliguric  repeat kidney US to assess hydronephrosis.  follow up  Urine cr:55.10, Check urine na  Continue IVF  monitor bmp, urine output  avoid nephrotoxic agents  renal dose medication     Proteinuria/hematuria  in setting of obstruction/ gaston  Monitor at present    Anemia  transfuse to keep hb>8  Iron level:22  Start on ferrous sulfate daily  monitor   76yo M with pmhx cad, htn, bph, recent admission to [facility] for CAREN severe b/l hydro s/p gaston and was discharged home 2 days ago with gaston in place p/w nausea, vomiting cloudy urine and weakness  nephrology consulted for renal failure    Renal failure  recent CAREN sec to obstructive uropathy, scr peaked >9 and had improved and stable ~4.1 upon discharge  scr peaked 4.8 on this admission, stable today  Caren likely sec to prerenal vs obstruction   Gaston in place and is non oliguric  kidney US noted with right mild hydronephrosis improved, Left moderate hydronephrosis unchanged.  follow up  Urine cr:55.10, Check urine na  Continue IVF  monitor bmp, urine output  avoid nephrotoxic agents  renal dose medication     Proteinuria/hematuria  in setting of obstruction/ gaston  Monitor at present    Anemia  transfuse to keep hb>8  Iron level:22  Start on ferrous sulfate daily  monitor

## 2020-10-26 LAB
ALBUMIN SERPL ELPH-MCNC: 1.9 G/DL — LOW (ref 3.3–5)
ANION GAP SERPL CALC-SCNC: 17 MMO/L — HIGH (ref 7–14)
BUN SERPL-MCNC: 34 MG/DL — HIGH (ref 7–23)
CALCIUM SERPL-MCNC: 7.6 MG/DL — LOW (ref 8.4–10.5)
CHLORIDE SERPL-SCNC: 102 MMOL/L — SIGNIFICANT CHANGE UP (ref 98–107)
CO2 SERPL-SCNC: 13 MMOL/L — LOW (ref 22–31)
CREAT SERPL-MCNC: 2.35 MG/DL — HIGH (ref 0.5–1.3)
GLUCOSE BLDC GLUCOMTR-MCNC: 88 MG/DL — SIGNIFICANT CHANGE UP (ref 70–99)
GLUCOSE SERPL-MCNC: 62 MG/DL — LOW (ref 70–99)
HCT VFR BLD CALC: 28 % — LOW (ref 39–50)
HCT VFR BLD CALC: 29.2 % — LOW (ref 39–50)
HCT VFR BLD CALC: 30.6 % — LOW (ref 39–50)
HGB BLD-MCNC: 9.1 G/DL — LOW (ref 13–17)
HGB BLD-MCNC: 9.4 G/DL — LOW (ref 13–17)
HGB BLD-MCNC: 9.6 G/DL — LOW (ref 13–17)
MAGNESIUM SERPL-MCNC: 1 MG/DL — CRITICAL LOW (ref 1.6–2.6)
MCHC RBC-ENTMCNC: 28.1 PG — SIGNIFICANT CHANGE UP (ref 27–34)
MCHC RBC-ENTMCNC: 28.1 PG — SIGNIFICANT CHANGE UP (ref 27–34)
MCHC RBC-ENTMCNC: 28.2 PG — SIGNIFICANT CHANGE UP (ref 27–34)
MCHC RBC-ENTMCNC: 31.4 % — LOW (ref 32–36)
MCHC RBC-ENTMCNC: 32.2 % — SIGNIFICANT CHANGE UP (ref 32–36)
MCHC RBC-ENTMCNC: 32.5 % — SIGNIFICANT CHANGE UP (ref 32–36)
MCV RBC AUTO: 86.4 FL — SIGNIFICANT CHANGE UP (ref 80–100)
MCV RBC AUTO: 87.2 FL — SIGNIFICANT CHANGE UP (ref 80–100)
MCV RBC AUTO: 89.7 FL — SIGNIFICANT CHANGE UP (ref 80–100)
NRBC # FLD: 0 K/UL — SIGNIFICANT CHANGE UP (ref 0–0)
PHOSPHATE SERPL-MCNC: 1.9 MG/DL — LOW (ref 2.5–4.5)
PLATELET # BLD AUTO: 171 K/UL — SIGNIFICANT CHANGE UP (ref 150–400)
PLATELET # BLD AUTO: 176 K/UL — SIGNIFICANT CHANGE UP (ref 150–400)
PLATELET # BLD AUTO: 202 K/UL — SIGNIFICANT CHANGE UP (ref 150–400)
PMV BLD: 10.4 FL — SIGNIFICANT CHANGE UP (ref 7–13)
PMV BLD: 11.5 FL — SIGNIFICANT CHANGE UP (ref 7–13)
PMV BLD: 11.7 FL — SIGNIFICANT CHANGE UP (ref 7–13)
POTASSIUM SERPL-MCNC: 4.3 MMOL/L — SIGNIFICANT CHANGE UP (ref 3.5–5.3)
POTASSIUM SERPL-SCNC: 4.3 MMOL/L — SIGNIFICANT CHANGE UP (ref 3.5–5.3)
RBC # BLD: 3.24 M/UL — LOW (ref 4.2–5.8)
RBC # BLD: 3.35 M/UL — LOW (ref 4.2–5.8)
RBC # BLD: 3.41 M/UL — LOW (ref 4.2–5.8)
RBC # FLD: 14.1 % — SIGNIFICANT CHANGE UP (ref 10.3–14.5)
RBC # FLD: 14.3 % — SIGNIFICANT CHANGE UP (ref 10.3–14.5)
RBC # FLD: 14.6 % — HIGH (ref 10.3–14.5)
SODIUM SERPL-SCNC: 132 MMOL/L — LOW (ref 135–145)
WBC # BLD: 5.73 K/UL — SIGNIFICANT CHANGE UP (ref 3.8–10.5)
WBC # BLD: 6.1 K/UL — SIGNIFICANT CHANGE UP (ref 3.8–10.5)
WBC # BLD: 6.67 K/UL — SIGNIFICANT CHANGE UP (ref 3.8–10.5)
WBC # FLD AUTO: 5.73 K/UL — SIGNIFICANT CHANGE UP (ref 3.8–10.5)
WBC # FLD AUTO: 6.1 K/UL — SIGNIFICANT CHANGE UP (ref 3.8–10.5)
WBC # FLD AUTO: 6.67 K/UL — SIGNIFICANT CHANGE UP (ref 3.8–10.5)

## 2020-10-26 PROCEDURE — 99232 SBSQ HOSP IP/OBS MODERATE 35: CPT

## 2020-10-26 RX ORDER — CARVEDILOL PHOSPHATE 80 MG/1
12.5 CAPSULE, EXTENDED RELEASE ORAL EVERY 12 HOURS
Refills: 0 | Status: DISCONTINUED | OUTPATIENT
Start: 2020-10-26 | End: 2020-10-30

## 2020-10-26 RX ORDER — CARVEDILOL PHOSPHATE 80 MG/1
6.25 CAPSULE, EXTENDED RELEASE ORAL ONCE
Refills: 0 | Status: COMPLETED | OUTPATIENT
Start: 2020-10-26 | End: 2020-10-26

## 2020-10-26 RX ORDER — METOPROLOL TARTRATE 50 MG
5 TABLET ORAL ONCE
Refills: 0 | Status: COMPLETED | OUTPATIENT
Start: 2020-10-26 | End: 2020-10-26

## 2020-10-26 RX ORDER — MAGNESIUM SULFATE 500 MG/ML
2 VIAL (ML) INJECTION ONCE
Refills: 0 | Status: COMPLETED | OUTPATIENT
Start: 2020-10-26 | End: 2020-10-26

## 2020-10-26 RX ORDER — CALCIUM GLUCONATE 100 MG/ML
1 VIAL (ML) INTRAVENOUS ONCE
Refills: 0 | Status: COMPLETED | OUTPATIENT
Start: 2020-10-26 | End: 2020-10-26

## 2020-10-26 RX ADMIN — TAMSULOSIN HYDROCHLORIDE 0.4 MILLIGRAM(S): 0.4 CAPSULE ORAL at 21:46

## 2020-10-26 RX ADMIN — Medication 100 GRAM(S): at 12:28

## 2020-10-26 RX ADMIN — Medication 5 MILLIGRAM(S): at 01:51

## 2020-10-26 RX ADMIN — CARVEDILOL PHOSPHATE 12.5 MILLIGRAM(S): 80 CAPSULE, EXTENDED RELEASE ORAL at 18:16

## 2020-10-26 RX ADMIN — Medication 50 GRAM(S): at 09:04

## 2020-10-26 RX ADMIN — PANTOPRAZOLE SODIUM 40 MILLIGRAM(S): 20 TABLET, DELAYED RELEASE ORAL at 05:48

## 2020-10-26 RX ADMIN — FINASTERIDE 5 MILLIGRAM(S): 5 TABLET, FILM COATED ORAL at 12:29

## 2020-10-26 RX ADMIN — CARVEDILOL PHOSPHATE 6.25 MILLIGRAM(S): 80 CAPSULE, EXTENDED RELEASE ORAL at 09:04

## 2020-10-26 RX ADMIN — Medication 325 MILLIGRAM(S): at 12:28

## 2020-10-26 RX ADMIN — CARVEDILOL PHOSPHATE 6.25 MILLIGRAM(S): 80 CAPSULE, EXTENDED RELEASE ORAL at 05:48

## 2020-10-26 RX ADMIN — CEFTRIAXONE 100 MILLIGRAM(S): 500 INJECTION, POWDER, FOR SOLUTION INTRAMUSCULAR; INTRAVENOUS at 18:16

## 2020-10-26 NOTE — PROGRESS NOTE ADULT - ASSESSMENT
76yo M with pmhx cad, htn, bph, recent admission to [facility] for CAREN severe b/l hydro s/p gaston and was discharged home 2 days ago with gaston in place p/w nausea, vomiting cloudy urine and weakness  nephrology consulted for renal failure    Renal failure  recent CAREN sec to obstructive uropathy, scr peaked >9 and had improved and stable ~4.1 upon discharge  scr peaked 4.8 on this admission, renal function improving.   lab this AM does not seem right. please repeat bmp  Caren likely sec to prerenal vs obstruction   Gaston in place and is non oliguric  kidney US noted with right mild hydronephrosis improved, Left moderate hydronephrosis unchanged.  follow up  d/c IVF  monitor bmp, urine output  avoid nephrotoxic agents  renal dose medication     Proteinuria/hematuria  in setting of obstruction/ gaston  Monitor at present    Anemia  transfuse to keep hb>8  continue iron supplement  monitor    hypomagnesemia   repeat bmp  supplement if needed    hypocalcemia  repeat bmp  check pth, and vit d 25  monitor    Acidosis  bicarb dropped from 21 yesterday  likely lab error  please repeat bmp  monitor for now       74yo M with pmhx cad, htn, bph, recent admission to [facility] for CAREN severe b/l hydro s/p gaston and was discharged home 2 days ago with gaston in place p/w nausea, vomiting cloudy urine and weakness  nephrology consulted for renal failure    Renal failure  recent CAREN sec to obstructive uropathy, scr peaked >9 and had improved and stable ~4.1 upon discharge  scr peaked 4.8 on this admission, renal function improving.   lab this AM does not seem right. please repeat bmp  Caren likely sec to prerenal vs obstruction   Gaston in place and is non oliguric  kidney US noted with right mild hydronephrosis improved, Left moderate hydronephrosis unchanged.  follow up  d/c IVF  monitor bmp, urine output  avoid nephrotoxic agents  renal dose medication     Proteinuria/hematuria  in setting of obstruction/ gaston  Monitor at present    Anemia  transfuse to keep hb>8  continue iron supplement  monitor    Hypomagnesemia   repeat bmp  supplement if needed    Hypocalcemia  repeat bmp  check pth, and vit d 25  monitor    Acidosis  bicarb dropped from 21 yesterday  likely lab error  please repeat bmp  monitor for now

## 2020-10-26 NOTE — PROGRESS NOTE ADULT - SUBJECTIVE AND OBJECTIVE BOX
[facility]   MD MOHIT CAMEJO DO ANAM SIDDIQUI ANGELA WONG, PA    TEL:  OFFICE: [phone]  DR KIM CELL: [phone]  DR. JACOB CELL: [phone]  DR. SINGH CELL: [phone]  GIBSON DSOUZA CELL: [phone]    From 5pm-7am Answering Service [phone]    -- RENAL FOLLOW UP NOTE ---Date of Service 10-26-20 @ 13:39    Patient is a 75y old  Male who presents with a chief complaint of problem with gaston (26 Oct 2020 11:08)      Patient seen and examined at bedside. No chest pain/sob    VITALS:  T(F): 98.4 (10-26-20 @ 09:02), Max: 98.4 (10-26-20 @ 09:02)  HR: 77 (10-26-20 @ 09:02)  BP: 153/98 (10-26-20 @ 09:02)  RR: 17 (10-26-20 @ 09:02)  SpO2: 97% (10-26-20 @ 09:02)  Wt(kg): --    10-25 @ 07:01  -  10-26 @ 07:00  --------------------------------------------------------  IN: 0 mL / OUT: 500 mL / NET: -500 mL    10-26 @ 07:01  -  10-26 @ 13:39  --------------------------------------------------------  IN: 0 mL / OUT: 3200 mL / NET: -3200 mL          PHYSICAL EXAM:  Constitutional: NAD  Neck: No JVD  Respiratory: CTAB, no wheezes, rales or rhonchi  Cardiovascular: S1, S2, RRR  Gastrointestinal: BS+, soft, NT/ND  Extremities: No peripheral edema    Hospital Medications:   MEDICATIONS  (STANDING):  carvedilol 12.5 milliGRAM(s) Oral every 12 hours  cefTRIAXone   IVPB 1000 milliGRAM(s) IV Intermittent every 24 hours  ferrous    sulfate 325 milliGRAM(s) Oral daily  finasteride 5 milliGRAM(s) Oral daily  pantoprazole    Tablet 40 milliGRAM(s) Oral before breakfast  tamsulosin 0.4 milliGRAM(s) Oral at bedtime      LABS:  10-26    132<L>  |  102  |  34<H>  ----------------------------<  62<L>  4.3   |  13<L>  |  2.35<H>    Ca    7.6<L>      26 Oct 2020 06:01  Phos  1.9     10-26  Mg     1.0     10-26    TPro      /  Alb  1.9<L>  /  TBili      /  DBili      /  AST      /  ALT      /  AlkPhos      10-26    Creatinine Trend: 2.35 <--, 3.78 <--, 3.88 <--, 4.83 <--    Phosphorus Level, Serum: 1.9 mg/dL (10-26 @ 06:01)  Albumin, Serum: 1.9 g/dL (10-26 @ 06:01)                              9.4    6.10  )-----------( 171      ( 26 Oct 2020 06:01 )             29.2     Urine Studies:  Urinalysis - [10-23-20 @ 20:05]      Color COLORLESS / Appearance Lt TURBID / SG 1.015 / pH 6.5      Gluc NEGATIVE / Ketone NEGATIVE  / Bili NEGATIVE / Urobili NORMAL       Blood LARGE / Protein 70 / Leuk Est LARGE / Nitrite NEGATIVE      RBC >50 / WBC 11-25 / Hyaline 1+ / Gran  / Sq Epi OCC / Non Sq Epi  / Bacteria FEW    Urine Creatinine 55.10      [10-23-20 @ 20:05]  Urine Sodium 102      [10-25-20 @ 13:25]  Urine Osmolality 421      [10-23-20 @ 20:05]    Iron 22, TIBC 170, %sat --      [10-24-20 @ 04:57]  Ferritin 587.2      [10-24-20 @ 04:57]  TSH 1.01      [10-08-20 @ 02:55]    HCV 0.13, Nonreact      [10-08-20 @ 10:59]      RADIOLOGY & ADDITIONAL STUDIES:

## 2020-10-26 NOTE — PROGRESS NOTE ADULT - SUBJECTIVE AND OBJECTIVE BOX
INTERVAL HPI/OVERNIGHT EVENTS:    denies n/v/d/c, abdominal pain, melena or brbpr   brown stool this morning x 1 per pt    MEDICATIONS  (STANDING):  carvedilol 12.5 milliGRAM(s) Oral every 12 hours  cefTRIAXone   IVPB 1000 milliGRAM(s) IV Intermittent every 24 hours  ferrous    sulfate 325 milliGRAM(s) Oral daily  finasteride 5 milliGRAM(s) Oral daily  pantoprazole    Tablet 40 milliGRAM(s) Oral before breakfast  tamsulosin 0.4 milliGRAM(s) Oral at bedtime    MEDICATIONS  (PRN):  ondansetron Injectable 4 milliGRAM(s) IV Push every 6 hours PRN Nausea and/or Vomiting      Allergies    penicillin (Rash)    Intolerances        Review of Systems:    General:  No wt loss, fevers, chills, night sweats, fatigue   Eyes:  Good vision, no reported pain  ENT:  No sore throat, pain, runny nose, dysphagia  CV:  No pain, palpitations, hypo/hypertension  Resp:  No dyspnea, cough, tachypnea, wheezing  GI:  No pain, No nausea, No vomiting, No diarrhea, No constipation, No weight loss, No fever, No pruritis, No rectal bleeding, No melena, No dysphagia  :  No pain, bleeding, incontinence, nocturia  Muscle:  No pain, weakness  Neuro:  No weakness, tingling, memory problems  Psych:  No fatigue, insomnia, mood problems, depression  Endocrine:  No polyuria, polydypsia, cold/heat intolerance  Heme:  No petechiae, ecchymosis, easy bruisability  Skin:  No rash, tattoos, scars, edema      Vital Signs Last 24 Hrs  T(C): 36.9 (26 Oct 2020 09:02), Max: 36.9 (26 Oct 2020 09:02)  T(F): 98.4 (26 Oct 2020 09:02), Max: 98.4 (26 Oct 2020 09:02)  HR: 77 (26 Oct 2020 09:02) (63 - 147)  BP: 153/98 (26 Oct 2020 09:02) (127/86 - 159/91)  BP(mean): --  RR: 17 (26 Oct 2020 09:02) (16 - 18)  SpO2: 97% (26 Oct 2020 09:02) (97% - 100%)    PHYSICAL EXAM:    Constitutional: NAD  HEENT: EOMI, throat clear  Neck: No LAD, supple  Respiratory: CTA and P  Cardiovascular: S1 and S2, RRR, no M  Gastrointestinal: BS+, soft, NT/ND, neg HSM,  Extremities: No peripheral edema, neg clubbing, cyanosis  Vascular: 2+ peripheral pulses  Neurological: A/O x 3, no focal deficits  Psychiatric: Normal mood, normal affect  Skin: No rashes      LABS:                        9.4    6.10  )-----------( 171      ( 26 Oct 2020 06:01 )             29.2     10-26    132<L>  |  102  |  34<H>  ----------------------------<  62<L>  4.3   |  13<L>  |  2.35<H>    Ca    7.6<L>      26 Oct 2020 06:01  Phos  1.9     10-26  Mg     1.0     10-26    TPro  x   /  Alb  1.9<L>  /  TBili  x   /  DBili  x   /  AST  x   /  ALT  x   /  AlkPhos  x   10-26          RADIOLOGY & ADDITIONAL TESTS:

## 2020-10-26 NOTE — PROGRESS NOTE ADULT - PROBLEM SELECTOR PLAN 4
On CKD- improving Creatinine stable around 2.35, monitor for now.   Hold hydration.    Renal following.

## 2020-10-26 NOTE — PROGRESS NOTE ADULT - SUBJECTIVE AND OBJECTIVE BOX
Patient is a 75y old  Male who presents with a chief complaint of weakness (23 Oct 2020 15:31)      SUBJECTIVE / OVERNIGHT EVENTS: No events overnight.   Patient feels ok.     T(C): 36.7 (10-26-20 @ 18:14), Max: 37 (10-26-20 @ 13:00)  HR: 64 (10-26-20 @ 18:14) (64 - 77)  BP: 155/99 (10-26-20 @ 18:14) (126/77 - 155/99)  RR: 17 (10-26-20 @ 18:14) (17 - 18)  SpO2: 99% (10-26-20 @ 18:14) (97% - 99%)    MEDICATIONS  (STANDING):  carvedilol 12.5 milliGRAM(s) Oral every 12 hours  cefTRIAXone   IVPB 1000 milliGRAM(s) IV Intermittent every 24 hours  ferrous    sulfate 325 milliGRAM(s) Oral daily  finasteride 5 milliGRAM(s) Oral daily  pantoprazole    Tablet 40 milliGRAM(s) Oral before breakfast  tamsulosin 0.4 milliGRAM(s) Oral at bedtime    MEDICATIONS  (PRN):  ondansetron Injectable 4 milliGRAM(s) IV Push every 6 hours PRN Nausea and/or Vomiting    PHYSICAL EXAM:  GENERAL: NAD, well-developed  HEAD:  Atraumatic, Normocephalic  EYES: EOMI, conjunctiva and sclera clear  NECK: Supple, No JVD  CHEST/LUNG: Clear to auscultation bilaterally; No wheeze  HEART: Regular rate and rhythm; No murmurs, rubs, or gallops  ABDOMEN: Soft, Nontender, Nondistended; Bowel sounds present  EXTREMITIES:  2+ Peripheral Pulses, No clubbing, cyanosis, or edema  PSYCH: AAOx3  NEUROLOGY: non-focal  SKIN: No rashes or lesions                                              9.6    5.73  )-----------( 202      ( 26 Oct 2020 15:29 )             30.6           LIVER FUNCTIONS - ( 26 Oct 2020 06:01 )  Alb: 1.9 g/dL / Pro: x     / ALK PHOS: x     / ALT: x     / AST: x     / GGT: x             132|102|34<62  4.3|13|2.35  7.6,1.0,1.9  10-26 @ 06:01    CAPILLARY BLOOD GLUCOSE                RADIOLOGY & ADDITIONAL TESTS:    Imaging Personally Reviewed:    Consultant(s) Notes Reviewed:      Care Discussed with Consultants/Other Providers:

## 2020-10-26 NOTE — PROGRESS NOTE ADULT - SUBJECTIVE AND OBJECTIVE BOX
Patient is a 75y old  Male who presents with a chief complaint of weakness  f/u possible uti  Interval History/ROS: denies fever.  on tele for afib RVR.  no dysuria.  gaston working fine.  Remainder of ROS otherwise negative.  PAST MEDICAL & SURGICAL HISTORY: HLD (hyperlipidemia) CAD (coronary artery disease) HTN (hypertension) BPH CKD  Allergies penicillin (Rash)  ANTIMICROBIALS: vancomycin  IVPB (10/23 x1) cefTRIAXone   IVPB 1000 every 24 hours (10/22-)  MEDICATIONS  (STANDING): carvedilol 12.5 every 12 hours finasteride 5 daily pantoprazole    Tablet 40 before breakfast tamsulosin 0.4 at bedtime  Vital Signs Last 24 Hrs T(F): 98.4 (10-26-20 @ 09:02), Max: 98.4 (10-26-20 @ 09:02) HR: 77 (10-26-20 @ 09:02) BP: 153/98 (10-26-20 @ 09:02) RR: 17 (10-26-20 @ 09:02) SpO2: 97% (10-26-20 @ 09:02) (96% - 100%)  PHYSICAL EXAM: Constitutional: non-toxic HEAD/EYES: anicteric ENT:  supple Cardiovascular:   normal S1, S2 Respiratory:  clear BS bilaterally GI:  soft, non-tender, normal bowel sounds :  gaston with clear urine Musculoskeletal:  no synovitis Neurologic: awake and alert, normal strength, no focal findings Skin:  no rash, no erythema, no phlebitis Psychiatric:  awake, alert, appropriate mood                                        9.4   6.10  )-----------( 171      ( 26 Oct 2020 06:01 )            29.2 10-26  132  |  102  |  34 ----------------------------<  62 4.3   |  13  |  2.35 Ca    7.6      26 Oct 2020 06:01Phos  1.9     10-26Mg     1.0     10-26 TPro  x   /  Alb  1.9  /  TBili  x   /  DBili  x   /  AST  x   /  ALT  x   /  AlkPhos  x   10-26  Creatinine, Serum: 2.35 (10-) Creatinine, Serum: 3.78 (10-) Creatinine, Serum: 3.88 (10-) Creatinine, Serum: 4.83 (10-22)  Urinalysis Basic - ( 22 Oct 2020 12:10 ) Color: YELLOW / Appearance: TURBID / S.012 / pH: 5.5 Gluc: NEGATIVE / Ketone: NEGATIVE  / Bili: NEGATIVE / Urobili: NORMAL  Blood: LARGE / Protein: 100 / Nitrite: POSITIVE  Leuk Esterase: LARGE / RBC: >50 / WBC >50  Sq Epi: FEW / Non Sq Epi: x / Bacteria: LARGE  MICROBIOLOGY: Culture - Blood (collected 10-22-20 @ 14:10) Source: .Blood Blood-Venous Preliminary Report (10-23-20 @ 15:01):   No growth to date.  Culture - Blood (collected 10-22-20 @ 14:10) Source: .Blood Blood-Peripheral Preliminary Report (10-23-20 @ 15:01):   No growth to date.  Culture - Urine (collected 10-22-20 @ 11:36) Source: .Urine Catheterized Final Report (10-25-20 @ 11:48):   >100,000 CFU/ml Escherichia coli     -  Amikacin: S <=16     -  Amoxicillin/Clavulanic Acid: S <=8/4     -  Ampicillin: S <=8     -  Ampicillin/Sulbactam: S <=4/2      -  Aztreonam: S <=4     -  Cefazolin: S <=2      -  Cefepime: S <=2     -  Cefoxitin: S <=8     -  Ceftriaxone: S <=1     -  Ciprofloxacin: S <=0.25     -  Ertapenem: S <=0.5     -  Gentamicin: S <=2     -  Imipenem: S <=1     -  Levofloxacin: S <=0.5     -  Meropenem: S <=1     -  Nitrofurantoin: S <=32     -  Piperacillin/Tazobactam: S <=8     -  Tigecycline: S <=2     -  Tobramycin: S <=2     -  Trimethoprim/Sulfamethoxazole: S <=0.5/9.5     Method Type: VIRGIL  COVID-19 PCR: NotDetec (10-22-20 @ 12:25)  Rapid RVP Result: NotDetec (10-18-20 @ 12:02)  Culture - Blood (collected 10-18-20 @ 14:08) Source: .Blood Blood-Peripheral Final Report (10-23-20 @ 15:00):   No Growth Final  Culture - Blood (collected 10-18-20 @ 14:08) Source: .Blood Blood-Peripheral Final Report (10-23-20 @ 15:00):   No Growth Final  Rapid RVP Result: NotDetec (10-18 @ 12:02)  Culture - Urine (collected 10-07-20 @ 17:49) Source: .Urine Catheterized Final Report (10-08-20 @ 17:00):   No growth  COVID-19 IgG Antibody Interpretation: Negative (10-08-20 @ 05:12) COVID-19 PCR: NotDetec (10-07-20 @ 13:02)    RADIOLOGY: imaging below personally reviewed and agree with findings  Xray Chest 1 View AP/PA (10.22.20 @ 14:36) > IMPRESSION:  No focal consolidation to indicate pneumonia.  US Kidney and Bladder (10.15.20 @ 15:44) > IMPRESSION:  Moderate bilateral hydronephrosis demonstrating mild decrease in degree compared to prior study.  Bilateral renal parenchymal disease.  Prostatomegaly.  US Kidney and Bladder (10.11.20 @ 22:36) > IMPRESSION:  Unchanged bilateral severe hydronephroureter.  Hypertrophied prostate. Central gland protruding into the bladder with thickening of the bladder wall consistent with some degree of bladder outlet obstruction.  Bladder calculus.  CT Abdomen and Pelvis No Cont (10.07.20 @ 14:13) > IMPRESSION:  Bilateral moderate to severe hydroureteronephrosis. No evidence of ureteral obstruction.  Enlarged prostate with chronic changes of bladder outlet obstruction. Bladder stone and Gaston catheter in the bladder.

## 2020-10-26 NOTE — PROGRESS NOTE ADULT - SUBJECTIVE AND OBJECTIVE BOX
Cardiovascular Disease Progress Note    Overnight events: Rapid a-fib noted overnight.  Now back to sinus. Patient denies chest pain or SOB.   Otherwise review of systems negative    Objective Findings:  T(C): 36.6 (10-26-20 @ 05:46), Max: 36.8 (10-25-20 @ 12:16)  HR: 72 (10-26-20 @ 05:46) (63 - 147)  BP: 143/85 (10-26-20 @ 05:46) (127/86 - 159/91)  RR: 17 (10-26-20 @ 05:46) (16 - 18)  SpO2: 99% (10-26-20 @ 05:46) (96% - 100%)  Wt(kg): --  Daily     Daily       Physical Exam:  Gen: NAD; Patient resting comfortably  HEENT: EOMI, Normocephalic/ atraumatic  CV: RRR, normal S1 + S2, no m/r/g  Lungs:  Normal respiratory effort; clear to auscultation bilaterally  Abd: soft, non-tender; bowel sounds present  Ext: No edema; warm and well perfused    Telemetry: Sinus    Laboratory Data:                        9.4    6.10  )-----------( 171      ( 26 Oct 2020 06:01 )             29.2     10-25    137  |  106  |  56<H>  ----------------------------<  105<H>  5.1   |  21<L>  |  3.78<H>    Ca    8.5      25 Oct 2020 06:30  Phos  3.8     10-25  Mg     1.6     10-25                Inpatient Medications:  MEDICATIONS  (STANDING):  carvedilol 6.25 milliGRAM(s) Oral every 12 hours  cefTRIAXone   IVPB 1000 milliGRAM(s) IV Intermittent every 24 hours  ferrous    sulfate 325 milliGRAM(s) Oral daily  finasteride 5 milliGRAM(s) Oral daily  lactated ringers. 1000 milliLiter(s) (100 mL/Hr) IV Continuous <Continuous>  pantoprazole    Tablet 40 milliGRAM(s) Oral before breakfast  tamsulosin 0.4 milliGRAM(s) Oral at bedtime      Assessment: 75 year old man with CAD s/p PCI several years ago, HTN, and BPH presents with UTI and a-fib with RVR    Plan of Care:    #A-fib with RVR-  Rapid a-fib noted again overnight.  Mr. Raymundo is now back in sinus.  Increase Coreg to 12.5 mg PO BID.   Given his recurrent anemia of unclear etiology, I would hold off on anticoagulation at this time.  Echo from earlier this month at [place] reviewed.     #HTN-  BP acceptable on current regimen.     #CAD s/p PCI-  PCI several years ago.   I will hold ASA until the source of anemia is better elucidated.      Over 25 minutes spent on total encounter; more than 50% of the visit was spent counseling and/or coordinating care by the attending physician.      Lionel Miller MD [facility]  Cardiovascular Disease  [phone]

## 2020-10-26 NOTE — PROGRESS NOTE ADULT - PROBLEM SELECTOR PLAN 1
No overt signs of bleeding   unclear etiology; r/o gib vs malignancy   plan for EGD/Colonoscopy on Wednesday morning; correct electrolytes prn  clear diet tomorrow; npo p mn tuesday

## 2020-10-26 NOTE — PROGRESS NOTE ADULT - PROBLEM SELECTOR PLAN 1
Hb stable s/p prbc, GI following.   No overt signs of bleeding.   Hold SQ Heparin.  EGD/ COLONOSCOPY on 10/28

## 2020-10-26 NOTE — PHYSICAL THERAPY INITIAL EVALUATION ADULT - PERTINENT HX OF CURRENT PROBLEM, REHAB EVAL
Patient is 75 year old male admitted with history of CAD, HTN, BPH, VENUS, gaston catheter, presents with nausea, vomiting and cloudy urine.

## 2020-10-26 NOTE — PROGRESS NOTE ADULT - ASSESSMENT
75M with CAD/stent, hypertension, BPH, CKD, nephrolithiasis here after recent hospitalization for VENUS on CKD due to bladder outlet obstruction requiring catheter.  Now with sepsis secondary to CAUTI.  In the past UC+ enterococcus.  PCN-allergic but okay with cephalosporins.  UC with very sensitive E coli.  Better.    Sepsis secondary to CAUTI  - continue ceftriaxone  - can change to vantin 200mg bid upon discharge to complete 7 days (today is D#5) - thru 10/28    Leukocytosis  - trend wbc  - monitor for fever    VENUS  - per renal    d/c planning    Please call Infectious Diseases if there is a change in status.  Thank you.  [phone].

## 2020-10-27 LAB
24R-OH-CALCIDIOL SERPL-MCNC: 29 NG/ML — LOW (ref 30–80)
ANION GAP SERPL CALC-SCNC: 13 MMO/L — SIGNIFICANT CHANGE UP (ref 7–14)
BUN SERPL-MCNC: 50 MG/DL — HIGH (ref 7–23)
CALCIUM SERPL-MCNC: 8.6 MG/DL — SIGNIFICANT CHANGE UP (ref 8.4–10.5)
CHLORIDE SERPL-SCNC: 103 MMOL/L — SIGNIFICANT CHANGE UP (ref 98–107)
CO2 SERPL-SCNC: 21 MMOL/L — LOW (ref 22–31)
CREAT SERPL-MCNC: 3.53 MG/DL — HIGH (ref 0.5–1.3)
CULTURE RESULTS: SIGNIFICANT CHANGE UP
CULTURE RESULTS: SIGNIFICANT CHANGE UP
GLUCOSE SERPL-MCNC: 86 MG/DL — SIGNIFICANT CHANGE UP (ref 70–99)
HCT VFR BLD CALC: 28.6 % — LOW (ref 39–50)
HGB BLD-MCNC: 9.4 G/DL — LOW (ref 13–17)
MAGNESIUM SERPL-MCNC: 2 MG/DL — SIGNIFICANT CHANGE UP (ref 1.6–2.6)
MCHC RBC-ENTMCNC: 28.4 PG — SIGNIFICANT CHANGE UP (ref 27–34)
MCHC RBC-ENTMCNC: 32.9 % — SIGNIFICANT CHANGE UP (ref 32–36)
MCV RBC AUTO: 86.4 FL — SIGNIFICANT CHANGE UP (ref 80–100)
NRBC # FLD: 0 K/UL — SIGNIFICANT CHANGE UP (ref 0–0)
PHOSPHATE SERPL-MCNC: 4 MG/DL — SIGNIFICANT CHANGE UP (ref 2.5–4.5)
PLATELET # BLD AUTO: 195 K/UL — SIGNIFICANT CHANGE UP (ref 150–400)
PMV BLD: 11 FL — SIGNIFICANT CHANGE UP (ref 7–13)
POTASSIUM SERPL-MCNC: 4.5 MMOL/L — SIGNIFICANT CHANGE UP (ref 3.5–5.3)
POTASSIUM SERPL-SCNC: 4.5 MMOL/L — SIGNIFICANT CHANGE UP (ref 3.5–5.3)
PSA FLD-MCNC: 18.2 NG/ML — HIGH (ref 0–4)
PTH-INTACT SERPL-MCNC: 76.61 PG/ML — HIGH (ref 15–65)
RBC # BLD: 3.31 M/UL — LOW (ref 4.2–5.8)
RBC # FLD: 13.8 % — SIGNIFICANT CHANGE UP (ref 10.3–14.5)
SODIUM SERPL-SCNC: 137 MMOL/L — SIGNIFICANT CHANGE UP (ref 135–145)
SPECIMEN SOURCE: SIGNIFICANT CHANGE UP
SPECIMEN SOURCE: SIGNIFICANT CHANGE UP
WBC # BLD: 6.18 K/UL — SIGNIFICANT CHANGE UP (ref 3.8–10.5)
WBC # FLD AUTO: 6.18 K/UL — SIGNIFICANT CHANGE UP (ref 3.8–10.5)

## 2020-10-27 RX ORDER — SOD SULF/SODIUM/NAHCO3/KCL/PEG
1 SOLUTION, RECONSTITUTED, ORAL ORAL EVERY 4 HOURS
Refills: 0 | Status: COMPLETED | OUTPATIENT
Start: 2020-10-27 | End: 2020-10-27

## 2020-10-27 RX ADMIN — Medication 1 LITER(S): at 21:24

## 2020-10-27 RX ADMIN — CARVEDILOL PHOSPHATE 12.5 MILLIGRAM(S): 80 CAPSULE, EXTENDED RELEASE ORAL at 18:22

## 2020-10-27 RX ADMIN — Medication 10 MILLIGRAM(S): at 16:21

## 2020-10-27 RX ADMIN — PANTOPRAZOLE SODIUM 40 MILLIGRAM(S): 20 TABLET, DELAYED RELEASE ORAL at 05:46

## 2020-10-27 RX ADMIN — CARVEDILOL PHOSPHATE 12.5 MILLIGRAM(S): 80 CAPSULE, EXTENDED RELEASE ORAL at 05:47

## 2020-10-27 RX ADMIN — FINASTERIDE 5 MILLIGRAM(S): 5 TABLET, FILM COATED ORAL at 18:23

## 2020-10-27 RX ADMIN — CEFTRIAXONE 100 MILLIGRAM(S): 500 INJECTION, POWDER, FOR SOLUTION INTRAMUSCULAR; INTRAVENOUS at 18:21

## 2020-10-27 RX ADMIN — TAMSULOSIN HYDROCHLORIDE 0.4 MILLIGRAM(S): 0.4 CAPSULE ORAL at 21:29

## 2020-10-27 RX ADMIN — Medication 10 MILLIGRAM(S): at 21:24

## 2020-10-27 RX ADMIN — Medication 325 MILLIGRAM(S): at 18:22

## 2020-10-27 RX ADMIN — Medication 1 LITER(S): at 18:22

## 2020-10-27 NOTE — CHART NOTE - NSCHARTNOTEFT_GEN_A_CORE
Pt. noted with PSA 18.20. As per discussion with medical attending, consulted urology. Awaiting further recs

## 2020-10-27 NOTE — PROGRESS NOTE ADULT - ASSESSMENT
74yo M with pmhx cad, htn, bph, recent admission to [facility] for CAREN severe b/l hydro s/p gaston and was discharged home 2 days ago with gaston in place p/w nausea, vomiting cloudy urine and weakness  nephrology consulted for renal failure    Renal failure  recent CAREN sec to obstructive uropathy, scr peaked >9 and had improved and stable ~4.1 upon discharge  scr peaked 4.8 on this admission, renal function improving.   Caren likely sec to prerenal vs obstruction   Gaston in place and is non oliguric  kidney US noted with right mild hydronephrosis improved, Left moderate hydronephrosis unchanged.  follow up  off IVF  monitor bmp, urine output  avoid nephrotoxic agents  renal dose medication     Proteinuria/hematuria  in setting of obstruction/ gaston  Monitor at present    Anemia  transfuse to keep hb>8  continue iron supplement  monitor    Hypomagnesemia   repeat bmp  supplement if needed    Hypocalcemia  better  elevated pth, pending vit d 25  monitor    Acidosis  likely lab error  repeat bmp is within limit  monitor for now

## 2020-10-27 NOTE — PROGRESS NOTE ADULT - SUBJECTIVE AND OBJECTIVE BOX
Cardiovascular Disease Progress Note    Overnight events: 3 hours of rapid a-fib overnight. Patient denies chest pain or SOB.    Otherwise review of systems negative    Objective Findings:  T(C): 36.8 (10-27-20 @ 05:42), Max: 37 (10-26-20 @ 13:00)  HR: 70 (10-27-20 @ 05:42) (64 - 77)  BP: 146/90 (10-27-20 @ 05:42) (126/77 - 155/99)  RR: 18 (10-27-20 @ 05:42) (17 - 18)  SpO2: 99% (10-27-20 @ 05:42) (97% - 99%)  Wt(kg): --  Daily     Daily       Physical Exam:  Gen: NAD; Patient resting comfortably  HEENT: EOMI, Normocephalic/ atraumatic  CV: RRR, normal S1 + S2, no m/r/g  Lungs:  Normal respiratory effort; clear to auscultation bilaterally  Abd: soft, non-tender; bowel sounds present  Ext: No edema; warm and well perfused    Telemetry: Sinus; 3 hours of a-fib overnight.     Laboratory Data:                        9.4    6.18  )-----------( 195      ( 27 Oct 2020 05:41 )             28.6     10-27    137  |  103  |  50<H>  ----------------------------<  86  4.5   |  21<L>  |  3.53<H>    Ca    8.6      27 Oct 2020 05:41  Phos  1.9     10-26  Mg     2.0     10-27    TPro  x   /  Alb  1.9<L>  /  TBili  x   /  DBili  x   /  AST  x   /  ALT  x   /  AlkPhos  x   10-26              Inpatient Medications:  MEDICATIONS  (STANDING):  carvedilol 12.5 milliGRAM(s) Oral every 12 hours  cefTRIAXone   IVPB 1000 milliGRAM(s) IV Intermittent every 24 hours  ferrous    sulfate 325 milliGRAM(s) Oral daily  finasteride 5 milliGRAM(s) Oral daily  pantoprazole    Tablet 40 milliGRAM(s) Oral before breakfast  tamsulosin 0.4 milliGRAM(s) Oral at bedtime      Assessment: 75 year old man with CAD s/p PCI several years ago, HTN, and BPH presents with UTI and a-fib with RVR    Plan of Care:    #A-fib with RVR-  3 hours of a-fib overnight.   Mr. Raymundo is now back in sinus.  Continue Coreg as ordered (recently uptitrated).   Given his recurrent anemia of unclear etiology, I would hold off on anticoagulation at this time.  Echo from earlier this month reviewed- preserved LVEF with no hemodynamically significant valvular disease.  No cardiac objection to low risk EGD/colonoscopy.      #HTN-  BP acceptable on current regimen.     #CAD s/p PCI-  PCI several years ago.   I will hold ASA until the source of anemia is better elucidated.      Over 25 minutes spent on total encounter; more than 50% of the visit was spent counseling and/or coordinating care by the attending physician.      Lionel Miller MD [facility]  Cardiovascular Disease  [phone]

## 2020-10-27 NOTE — PROGRESS NOTE ADULT - SUBJECTIVE AND OBJECTIVE BOX
INTERVAL HPI/OVERNIGHT EVENTS:    denies n/v/d/c, abdominal pain, melena or brbpr     MEDICATIONS  (STANDING):  bisacodyl 10 milliGRAM(s) Oral every 4 hours  carvedilol 12.5 milliGRAM(s) Oral every 12 hours  cefTRIAXone   IVPB 1000 milliGRAM(s) IV Intermittent every 24 hours  ferrous    sulfate 325 milliGRAM(s) Oral daily  finasteride 5 milliGRAM(s) Oral daily  pantoprazole    Tablet 40 milliGRAM(s) Oral before breakfast  polyethylene glycol/electrolyte Solution 1 Liter(s) Oral every 4 hours  tamsulosin 0.4 milliGRAM(s) Oral at bedtime    MEDICATIONS  (PRN):  ondansetron Injectable 4 milliGRAM(s) IV Push every 6 hours PRN Nausea and/or Vomiting      Allergies    penicillin (Rash)    Intolerances        Review of Systems:    General:  No wt loss, fevers, chills, night sweats, fatigue   Eyes:  Good vision, no reported pain  ENT:  No sore throat, pain, runny nose, dysphagia  CV:  No pain, palpitations, hypo/hypertension  Resp:  No dyspnea, cough, tachypnea, wheezing  GI:  No pain, No nausea, No vomiting, No diarrhea, No constipation, No weight loss, No fever, No pruritis, No rectal bleeding, No melena, No dysphagia  :  No pain, bleeding, incontinence, nocturia  Muscle:  No pain, weakness  Neuro:  No weakness, tingling, memory problems  Psych:  No fatigue, insomnia, mood problems, depression  Endocrine:  No polyuria, polydypsia, cold/heat intolerance  Heme:  No petechiae, ecchymosis, easy bruisability  Skin:  No rash, tattoos, scars, edema      Vital Signs Last 24 Hrs  T(C): 36.8 (27 Oct 2020 05:42), Max: 37 (26 Oct 2020 13:00)  T(F): 98.2 (27 Oct 2020 05:42), Max: 98.6 (26 Oct 2020 13:00)  HR: 70 (27 Oct 2020 05:42) (64 - 70)  BP: 146/90 (27 Oct 2020 05:42) (126/77 - 155/99)  BP(mean): --  RR: 18 (27 Oct 2020 05:42) (17 - 18)  SpO2: 99% (27 Oct 2020 05:42) (98% - 99%)    PHYSICAL EXAM:    Constitutional: NAD  HEENT: EOMI, throat clear  Neck: No LAD, supple  Respiratory: CTA and P  Cardiovascular: S1 and S2, RRR, no M  Gastrointestinal: BS+, soft, NT/ND, neg HSM,  Extremities: No peripheral edema, neg clubbing, cyanosis  Vascular: 2+ peripheral pulses  Neurological: A/O x 3, no focal deficits  Psychiatric: Normal mood, normal affect  Skin: No rashes      LABS:                        9.4    6.18  )-----------( 195      ( 27 Oct 2020 05:41 )             28.6     10-27    137  |  103  |  50<H>  ----------------------------<  86  4.5   |  21<L>  |  3.53<H>    Ca    8.6      27 Oct 2020 05:41  Phos  4.0     10-27  Mg     2.0     10-27    TPro  x   /  Alb  1.9<L>  /  TBili  x   /  DBili  x   /  AST  x   /  ALT  x   /  AlkPhos  x   10-26          RADIOLOGY & ADDITIONAL TESTS:

## 2020-10-27 NOTE — PROGRESS NOTE ADULT - SUBJECTIVE AND OBJECTIVE BOX
[facility]   MD MOHIT CAMEJO DO ANAM SIDDIQUI ANGELA WONG, PA    TEL:  OFFICE: [phone]  DR KIM CELL: [phone]  DR. JACOB CELL: [phone]  DR. SINGH CELL: [phone]  GIBSON DSOUZA CELL: [phone]    From 5pm-7am Answering Service [phone]    -- RENAL FOLLOW UP NOTE ---Date of Service 10-27-20 @ 14:32    Patient is a 75y old  Male who presents with a chief complaint of problem with gaston (26 Oct 2020 11:08)      Patient seen and examined at bedside. No chest pain/sob    VITALS:  T(F): 98.4 (10-27-20 @ 11:39), Max: 98.6 (10-26-20 @ 21:10)  HR: 59 (10-27-20 @ 11:39)  BP: 142/85 (10-27-20 @ 11:39)  RR: 18 (10-27-20 @ 11:39)  SpO2: 98% (10-27-20 @ 11:39)  Wt(kg): --    10-26 @ 07:01  -  10-27 @ 07:00  --------------------------------------------------------  IN: 354 mL / OUT: 5210 mL / NET: -4856 mL          PHYSICAL EXAM:  Constitutional: NAD  Neck: No JVD  Respiratory: CTAB, no wheezes, rales or rhonchi  Cardiovascular: S1, S2, RRR  Gastrointestinal: BS+, soft, NT/ND  Extremities: No peripheral edema    Hospital Medications:   MEDICATIONS  (STANDING):  bisacodyl 10 milliGRAM(s) Oral every 4 hours  carvedilol 12.5 milliGRAM(s) Oral every 12 hours  cefTRIAXone   IVPB 1000 milliGRAM(s) IV Intermittent every 24 hours  ferrous    sulfate 325 milliGRAM(s) Oral daily  finasteride 5 milliGRAM(s) Oral daily  pantoprazole    Tablet 40 milliGRAM(s) Oral before breakfast  polyethylene glycol/electrolyte Solution 1 Liter(s) Oral every 4 hours  tamsulosin 0.4 milliGRAM(s) Oral at bedtime      LABS:  10-27    137  |  103  |  50<H>  ----------------------------<  86  4.5   |  21<L>  |  3.53<H>    Ca    8.6      27 Oct 2020 05:41  Phos  4.0     10-27  Mg     2.0     10-27    TPro      /  Alb  1.9<L>  /  TBili      /  DBili      /  AST      /  ALT      /  AlkPhos      10-26    Creatinine Trend: 3.53 <--, 2.35 <--, 3.78 <--, 3.88 <--, 4.83 <--    Phosphorus Level, Serum: 4.0 mg/dL (10-27 @ 05:41)    calcium--  intact pth--  parathyroid hormone intact, serum76.61                            9.4    6.18  )-----------( 195      ( 27 Oct 2020 05:41 )             28.6     Urine Studies:  Urinalysis - [10-23-20 @ 20:05]      Color COLORLESS / Appearance Lt TURBID / SG 1.015 / pH 6.5      Gluc NEGATIVE / Ketone NEGATIVE  / Bili NEGATIVE / Urobili NORMAL       Blood LARGE / Protein 70 / Leuk Est LARGE / Nitrite NEGATIVE      RBC >50 / WBC 11-25 / Hyaline 1+ / Gran  / Sq Epi OCC / Non Sq Epi  / Bacteria FEW    Urine Creatinine 55.10      [10-23-20 @ 20:05]  Urine Sodium 102      [10-25-20 @ 13:25]  Urine Osmolality 421      [10-23-20 @ 20:05]    Iron 22, TIBC 170, %sat --      [10-24-20 @ 04:57]  Ferritin 587.2      [10-24-20 @ 04:57]  PTH 76.61 (Ca --)      [10-27-20 @ 05:41]   --  TSH 1.01      [10-08-20 @ 02:55]    HCV 0.13, Nonreact      [10-08-20 @ 10:59]      RADIOLOGY & ADDITIONAL STUDIES:

## 2020-10-27 NOTE — PROGRESS NOTE ADULT - PROBLEM SELECTOR PLAN 4
On CKD- improving Creatinine stable around 3, monitor for now.   Resume fluids.     Renal following.

## 2020-10-27 NOTE — PROGRESS NOTE ADULT - PROBLEM SELECTOR PLAN 1
No overt signs of bleeding   unclear etiology; r/o gib vs malignancy   bowel prep ordered  CLD today and NPO p MN for EGD/Colonoscopy tomorrow  cardiology clearance appreciated

## 2020-10-27 NOTE — PROGRESS NOTE ADULT - SUBJECTIVE AND OBJECTIVE BOX
Patient is a 75y old  Male who presents with a chief complaint of weakness (23 Oct 2020 15:31)      SUBJECTIVE / OVERNIGHT EVENTS: No events overnight.   Patient feels ok.     T(C): 36.9 (10-27-20 @ 11:39), Max: 36.9 (10-27-20 @ 11:39)  HR: 65 (10-27-20 @ 18:20) (59 - 65)  BP: 144/85 (10-27-20 @ 18:20) (142/85 - 144/85)  RR: 18 (10-27-20 @ 11:39) (18 - 18)  SpO2: 98% (10-27-20 @ 11:39) (98% - 98%)      MEDICATIONS  (STANDING):  bisacodyl 10 milliGRAM(s) Oral every 4 hours  carvedilol 12.5 milliGRAM(s) Oral every 12 hours  cefTRIAXone   IVPB 1000 milliGRAM(s) IV Intermittent every 24 hours  ferrous    sulfate 325 milliGRAM(s) Oral daily  finasteride 5 milliGRAM(s) Oral daily  pantoprazole    Tablet 40 milliGRAM(s) Oral before breakfast  polyethylene glycol/electrolyte Solution 1 Liter(s) Oral every 4 hours  tamsulosin 0.4 milliGRAM(s) Oral at bedtime    MEDICATIONS  (PRN):  ondansetron Injectable 4 milliGRAM(s) IV Push every 6 hours PRN Nausea and/or Vomiting      PHYSICAL EXAM:  GENERAL: NAD, well-developed  HEAD:  Atraumatic, Normocephalic  EYES: EOMI, conjunctiva and sclera clear  NECK: Supple, No JVD  CHEST/LUNG: Clear to auscultation bilaterally; No wheeze  HEART: Regular rate and rhythm; No murmurs, rubs, or gallops  ABDOMEN: Soft, Nontender, Nondistended; Bowel sounds present  EXTREMITIES:  2+ Peripheral Pulses, No clubbing, cyanosis, or edema  PSYCH: AAOx3  NEUROLOGY: non-focal  SKIN: No rashes or lesions                                             9.4    6.18  )-----------( 195      ( 27 Oct 2020 05:41 )             28.6           LIVER FUNCTIONS - ( 26 Oct 2020 06:01 )  Alb: 1.9 g/dL / Pro: x     / ALK PHOS: x     / ALT: x     / AST: x     / GGT: x             137|103|50<86  4.5|21|3.53  8.6,2.0,4.0  10-27 @ 05:41    CAPILLARY BLOOD GLUCOSE        RADIOLOGY & ADDITIONAL TESTS:    Imaging Personally Reviewed:    Consultant(s) Notes Reviewed:      Care Discussed with Consultants/Other Providers:

## 2020-10-28 LAB
ANION GAP SERPL CALC-SCNC: 18 MMO/L — HIGH (ref 7–14)
APTT BLD: 34.1 SEC — SIGNIFICANT CHANGE UP (ref 27–36.3)
BASOPHILS # BLD AUTO: 0.07 K/UL — SIGNIFICANT CHANGE UP (ref 0–0.2)
BASOPHILS NFR BLD AUTO: 1 % — SIGNIFICANT CHANGE UP (ref 0–2)
BLD GP AB SCN SERPL QL: NEGATIVE — SIGNIFICANT CHANGE UP
BUN SERPL-MCNC: 43 MG/DL — HIGH (ref 7–23)
CALCIUM SERPL-MCNC: 9.3 MG/DL — SIGNIFICANT CHANGE UP (ref 8.4–10.5)
CHLORIDE SERPL-SCNC: 103 MMOL/L — SIGNIFICANT CHANGE UP (ref 98–107)
CO2 SERPL-SCNC: 20 MMOL/L — LOW (ref 22–31)
CREAT SERPL-MCNC: 3.74 MG/DL — HIGH (ref 0.5–1.3)
EOSINOPHIL # BLD AUTO: 0.58 K/UL — HIGH (ref 0–0.5)
EOSINOPHIL NFR BLD AUTO: 7.9 % — HIGH (ref 0–6)
GLUCOSE SERPL-MCNC: 93 MG/DL — SIGNIFICANT CHANGE UP (ref 70–99)
HCT VFR BLD CALC: 35.1 % — LOW (ref 39–50)
HGB BLD-MCNC: 11 G/DL — LOW (ref 13–17)
IMM GRANULOCYTES NFR BLD AUTO: 2.3 % — HIGH (ref 0–1.5)
INR BLD: 1.02 — SIGNIFICANT CHANGE UP (ref 0.88–1.16)
LYMPHOCYTES # BLD AUTO: 0.94 K/UL — LOW (ref 1–3.3)
LYMPHOCYTES # BLD AUTO: 12.9 % — LOW (ref 13–44)
MAGNESIUM SERPL-MCNC: 2.1 MG/DL — SIGNIFICANT CHANGE UP (ref 1.6–2.6)
MCHC RBC-ENTMCNC: 28.6 PG — SIGNIFICANT CHANGE UP (ref 27–34)
MCHC RBC-ENTMCNC: 31.3 % — LOW (ref 32–36)
MCV RBC AUTO: 91.2 FL — SIGNIFICANT CHANGE UP (ref 80–100)
MONOCYTES # BLD AUTO: 0.61 K/UL — SIGNIFICANT CHANGE UP (ref 0–0.9)
MONOCYTES NFR BLD AUTO: 8.3 % — SIGNIFICANT CHANGE UP (ref 2–14)
NEUTROPHILS # BLD AUTO: 4.94 K/UL — SIGNIFICANT CHANGE UP (ref 1.8–7.4)
NEUTROPHILS NFR BLD AUTO: 67.6 % — SIGNIFICANT CHANGE UP (ref 43–77)
NRBC # FLD: 0 K/UL — SIGNIFICANT CHANGE UP (ref 0–0)
PHOSPHATE SERPL-MCNC: 4.5 MG/DL — SIGNIFICANT CHANGE UP (ref 2.5–4.5)
PLATELET # BLD AUTO: 217 K/UL — SIGNIFICANT CHANGE UP (ref 150–400)
PMV BLD: 11.2 FL — SIGNIFICANT CHANGE UP (ref 7–13)
POTASSIUM SERPL-MCNC: 4.1 MMOL/L — SIGNIFICANT CHANGE UP (ref 3.5–5.3)
POTASSIUM SERPL-SCNC: 4.1 MMOL/L — SIGNIFICANT CHANGE UP (ref 3.5–5.3)
PROTHROM AB SERPL-ACNC: 11.7 SEC — SIGNIFICANT CHANGE UP (ref 10.6–13.6)
RBC # BLD: 3.85 M/UL — LOW (ref 4.2–5.8)
RBC # FLD: 13.6 % — SIGNIFICANT CHANGE UP (ref 10.3–14.5)
RH IG SCN BLD-IMP: POSITIVE — SIGNIFICANT CHANGE UP
SODIUM SERPL-SCNC: 141 MMOL/L — SIGNIFICANT CHANGE UP (ref 135–145)
WBC # BLD: 7.31 K/UL — SIGNIFICANT CHANGE UP (ref 3.8–10.5)
WBC # FLD AUTO: 7.31 K/UL — SIGNIFICANT CHANGE UP (ref 3.8–10.5)

## 2020-10-28 PROCEDURE — 88305 TISSUE EXAM BY PATHOLOGIST: CPT | Mod: 26

## 2020-10-28 PROCEDURE — 88312 SPECIAL STAINS GROUP 1: CPT | Mod: 26

## 2020-10-28 RX ADMIN — CARVEDILOL PHOSPHATE 12.5 MILLIGRAM(S): 80 CAPSULE, EXTENDED RELEASE ORAL at 17:14

## 2020-10-28 RX ADMIN — Medication 325 MILLIGRAM(S): at 17:14

## 2020-10-28 RX ADMIN — PANTOPRAZOLE SODIUM 40 MILLIGRAM(S): 20 TABLET, DELAYED RELEASE ORAL at 06:05

## 2020-10-28 RX ADMIN — CARVEDILOL PHOSPHATE 12.5 MILLIGRAM(S): 80 CAPSULE, EXTENDED RELEASE ORAL at 06:05

## 2020-10-28 RX ADMIN — FINASTERIDE 5 MILLIGRAM(S): 5 TABLET, FILM COATED ORAL at 17:13

## 2020-10-28 RX ADMIN — TAMSULOSIN HYDROCHLORIDE 0.4 MILLIGRAM(S): 0.4 CAPSULE ORAL at 21:21

## 2020-10-28 RX ADMIN — CEFTRIAXONE 100 MILLIGRAM(S): 500 INJECTION, POWDER, FOR SOLUTION INTRAMUSCULAR; INTRAVENOUS at 17:14

## 2020-10-28 NOTE — PROGRESS NOTE ADULT - ASSESSMENT
76yo M with pmhx cad, htn, bph, recent admission to [facility] for CAREN severe b/l hydro s/p gaston and was discharged home 2 days ago with gaston in place p/w nausea, vomiting cloudy urine and weakness  nephrology consulted for renal failure    Renal failure  recent CAREN sec to obstructive uropathy, scr peaked >9 and had improved and stable ~4.1 upon discharge  scr peaked 4.8 on this admission, renal function improving.   Caren likely sec to prerenal vs obstruction   Gaston in place and is non oliguric  kidney US noted with right mild hydronephrosis improved, Left moderate hydronephrosis unchanged.  follow up  off IVF  monitor bmp, urine output  avoid nephrotoxic agents  renal dose medication     Proteinuria/hematuria  in setting of obstruction/ gaston  Monitor at present    Anemia  transfuse to keep hb>8  continue iron supplement  s/p GI work up  f/u GI  monitor    Hypomagnesemia   improved  supplement if needed    Hypocalcemia  better  elevated pth,  vit d 25 ok   monitor    Acidosis  likely lab error  repeat bmp is within limit  monitor for now

## 2020-10-28 NOTE — PROGRESS NOTE ADULT - SUBJECTIVE AND OBJECTIVE BOX
[facility]   MD MOHIT CAMEJO DO ANAM SIDDIQUI ANGELA WONG, PA    TEL:  OFFICE: [phone]  DR KIM CELL: [phone]  DR. JACOB CELL: [phone]  DR. SINGH CELL: [phone]  GIBSON DSOUZA CELL: [phone]    From 5pm-7am Answering Service [phone]    -- RENAL FOLLOW UP NOTE ---Date of Service 10-28-20 @ 13:58    Patient is a 75y old  Male who presents with a chief complaint of problem with gastno (26 Oct 2020 11:08)      Patient seen and examined at bedside. No chest pain/sob    VITALS:  T(F): 97.8 (10-28-20 @ 12:45), Max: 98.4 (10-27-20 @ 20:15)  HR: 60 (10-28-20 @ 12:45)  BP: 136/94 (10-28-20 @ 12:45)  RR: 16 (10-28-20 @ 12:45)  SpO2: 99% (10-28-20 @ 12:45)  Wt(kg): --    10-27 @ 07:01  -  10-28 @ 07:00  --------------------------------------------------------  IN: 0 mL / OUT: 400 mL / NET: -400 mL      Height (cm): 177.8 (10-28 @ 06:50)  Weight (kg): 81.6 (10-28 @ 06:50)  BMI (kg/m2): 25.8 (10-28 @ 06:50)  BSA (m2): 2 (10-28 @ 06:50)    PHYSICAL EXAM:  Constitutional: NAD  Neck: No JVD  Respiratory: CTAB, no wheezes, rales or rhonchi  Cardiovascular: S1, S2, RRR  Gastrointestinal: BS+, soft, NT/ND  Extremities: No peripheral edema    Hospital Medications:   MEDICATIONS  (STANDING):  carvedilol 12.5 milliGRAM(s) Oral every 12 hours  cefTRIAXone   IVPB 1000 milliGRAM(s) IV Intermittent every 24 hours  ferrous    sulfate 325 milliGRAM(s) Oral daily  finasteride 5 milliGRAM(s) Oral daily  pantoprazole    Tablet 40 milliGRAM(s) Oral before breakfast  tamsulosin 0.4 milliGRAM(s) Oral at bedtime      LABS:  10-28    141  |  103  |  43<H>  ----------------------------<  93  4.1   |  20<L>  |  3.74<H>    Ca    9.3      28 Oct 2020 06:30  Phos  4.5     10-28  Mg     2.1     10-28      Creatinine Trend: 3.74 <--, 3.53 <--, 2.35 <--, 3.78 <--, 3.88 <--, 4.83 <--    Phosphorus Level, Serum: 4.5 mg/dL (10-28 @ 06:30)                              11.0   7.31  )-----------( 217      ( 28 Oct 2020 06:30 )             35.1     Urine Studies:  Urinalysis - [10-23-20 @ 20:05]      Color COLORLESS / Appearance Lt TURBID / SG 1.015 / pH 6.5      Gluc NEGATIVE / Ketone NEGATIVE  / Bili NEGATIVE / Urobili NORMAL       Blood LARGE / Protein 70 / Leuk Est LARGE / Nitrite NEGATIVE      RBC >50 / WBC 11-25 / Hyaline 1+ / Gran  / Sq Epi OCC / Non Sq Epi  / Bacteria FEW    Urine Creatinine 55.10      [10-23-20 @ 20:05]  Urine Sodium 102      [10-25-20 @ 13:25]  Urine Osmolality 421      [10-23-20 @ 20:05]    Iron 22, TIBC 170, %sat --      [10-24-20 @ 04:57]  Ferritin 587.2      [10-24-20 @ 04:57]  PTH 76.61 (Ca --)      [10-27-20 @ 05:41]   --  Vitamin D (25OH) 29.0      [10-27-20 @ 05:41]  TSH 1.01      [10-08-20 @ 02:55]    HCV 0.13, Nonreact      [10-08-20 @ 10:59]      RADIOLOGY & ADDITIONAL STUDIES:

## 2020-10-28 NOTE — PROGRESS NOTE ADULT - SUBJECTIVE AND OBJECTIVE BOX
Patient is a 75y old  Male who presents with a chief complaint of weakness (23 Oct 2020 15:31)      SUBJECTIVE / OVERNIGHT EVENTS: No events overnight.   Patient feels ok.     T(C): 36.6 (10-28-20 @ 12:45), Max: 36.7 (10-28-20 @ 06:50)  HR: 69 (10-28-20 @ 17:13) (60 - 69)  BP: 140/91 (10-28-20 @ 17:13) (112/74 - 161/81)  RR: 16 (10-28-20 @ 12:45) (12 - 18)  SpO2: 99% (10-28-20 @ 12:45) (97% - 99%)    MEDICATIONS  (STANDING):  carvedilol 12.5 milliGRAM(s) Oral every 12 hours  cefTRIAXone   IVPB 1000 milliGRAM(s) IV Intermittent every 24 hours  ferrous    sulfate 325 milliGRAM(s) Oral daily  finasteride 5 milliGRAM(s) Oral daily  pantoprazole    Tablet 40 milliGRAM(s) Oral before breakfast  tamsulosin 0.4 milliGRAM(s) Oral at bedtime    MEDICATIONS  (PRN):  ondansetron Injectable 4 milliGRAM(s) IV Push every 6 hours PRN Nausea and/or Vomiting      PHYSICAL EXAM:  GENERAL: NAD, well-developed  HEAD:  Atraumatic, Normocephalic  EYES: EOMI, conjunctiva and sclera clear  NECK: Supple, No JVD  CHEST/LUNG: Clear to auscultation bilaterally; No wheeze  HEART: Regular rate and rhythm; No murmurs, rubs, or gallops  ABDOMEN: Soft, Nontender, Nondistended; Bowel sounds present  EXTREMITIES:  2+ Peripheral Pulses, No clubbing, cyanosis, or edema  PSYCH: AAOx3  NEUROLOGY: non-focal  SKIN: No rashes or lesions                                   11.0   7.31  )-----------( 217      ( 28 Oct 2020 06:30 )             35.1             PT/INR - ( 28 Oct 2020 06:30 )   PT: 11.7 SEC;   INR: 1.02          PTT - ( 28 Oct 2020 06:30 )  PTT:34.1 SEC  141|103|43<93  4.1|20|3.74  9.3,2.1,4.5  10-28 @ 06:30                                CAPILLARY BLOOD GLUCOSE        RADIOLOGY & ADDITIONAL TESTS:    Imaging Personally Reviewed:    Consultant(s) Notes Reviewed:      Care Discussed with Consultants/Other Providers:

## 2020-10-28 NOTE — CHART NOTE - NSCHARTNOTEFT_GEN_A_CORE
Informed of elevated PSA of 18.2 on 10/26.  Level is likely inaccurate while acutely ill.  Plan to recheck as outpatient, and further work-up will be obtained as outpatient.  Patient may follow-up with Dr. Lam at the [facility], [phone].  Communicated plan with Dr. Armenta.  -Discussed with Dr. Contreras

## 2020-10-28 NOTE — PROGRESS NOTE ADULT - PROBLEM SELECTOR PLAN 1
Hb stable s/p prbc, GI following.   No overt signs of bleeding.   Hold SQ Heparin.  EGD/ COLONOSCOPY today

## 2020-10-28 NOTE — PACU DISCHARGE NOTE - HYDRATION STATUS:
Ayla Dela Cruz is 76-year-old female with a past medical history of COPD, , hypertension, congestive heart failure who is status post routine upper GI this morning per [facility] which demonstrated mild gastritis.  She was evaluated in the emergency room department yesterday for evaluation of right chest wall pain. She was initiated on lidocaine patch and Tylenol.  She underwent CT of the abdomen which was read by [facility]iology this morning and demonstrated possible right renal infarct.  BMP upon admit demonstrated normal renal function.  She states she underwent a colonoscopy 3 days ago and since that procedure she has had right-sided abdominal pain.  She is so CAD this with constipation and tried using an enema which did relieve some of her pain. She states she has been dry heaving from the nausea.  Denies vomiting.  She states right chest wall pain is a 6/10 and sharp in nature and increases with movement.  She has been admitted for urology consultation for possible renal infarct.   Satisfactory

## 2020-10-28 NOTE — PROGRESS NOTE ADULT - PROBLEM SELECTOR PLAN 2
Gaston associated UTI / Acute pyelonephritis- Continue with Ceftriaxone. Urine cultures show E.coli, blood cultures show no growth to date.   Urology, Renal consults following. Bilateral moderate hydroureteronephrosis- last sono, post obstructive Uropathy. Continue with gaston, Flomax. Continue with Finasteride.  PSA around 18, left message with urology to follow.

## 2020-10-29 LAB
ANION GAP SERPL CALC-SCNC: 14 MMO/L — SIGNIFICANT CHANGE UP (ref 7–14)
BUN SERPL-MCNC: 41 MG/DL — HIGH (ref 7–23)
CALCIUM SERPL-MCNC: 8.8 MG/DL — SIGNIFICANT CHANGE UP (ref 8.4–10.5)
CHLORIDE SERPL-SCNC: 104 MMOL/L — SIGNIFICANT CHANGE UP (ref 98–107)
CO2 SERPL-SCNC: 19 MMOL/L — LOW (ref 22–31)
CREAT SERPL-MCNC: 3.72 MG/DL — HIGH (ref 0.5–1.3)
GLUCOSE SERPL-MCNC: 94 MG/DL — SIGNIFICANT CHANGE UP (ref 70–99)
HCT VFR BLD CALC: 31.8 % — LOW (ref 39–50)
HGB BLD-MCNC: 9.9 G/DL — LOW (ref 13–17)
MAGNESIUM SERPL-MCNC: 1.9 MG/DL — SIGNIFICANT CHANGE UP (ref 1.6–2.6)
MCHC RBC-ENTMCNC: 28.2 PG — SIGNIFICANT CHANGE UP (ref 27–34)
MCHC RBC-ENTMCNC: 31.1 % — LOW (ref 32–36)
MCV RBC AUTO: 90.6 FL — SIGNIFICANT CHANGE UP (ref 80–100)
NRBC # FLD: 0 K/UL — SIGNIFICANT CHANGE UP (ref 0–0)
PHOSPHATE SERPL-MCNC: 4.6 MG/DL — HIGH (ref 2.5–4.5)
PLATELET # BLD AUTO: 224 K/UL — SIGNIFICANT CHANGE UP (ref 150–400)
PMV BLD: 10.4 FL — SIGNIFICANT CHANGE UP (ref 7–13)
POTASSIUM SERPL-MCNC: 4.5 MMOL/L — SIGNIFICANT CHANGE UP (ref 3.5–5.3)
POTASSIUM SERPL-SCNC: 4.5 MMOL/L — SIGNIFICANT CHANGE UP (ref 3.5–5.3)
RBC # BLD: 3.51 M/UL — LOW (ref 4.2–5.8)
RBC # FLD: 13.5 % — SIGNIFICANT CHANGE UP (ref 10.3–14.5)
SODIUM SERPL-SCNC: 137 MMOL/L — SIGNIFICANT CHANGE UP (ref 135–145)
SURGICAL PATHOLOGY STUDY: SIGNIFICANT CHANGE UP
WBC # BLD: 6.9 K/UL — SIGNIFICANT CHANGE UP (ref 3.8–10.5)
WBC # FLD AUTO: 6.9 K/UL — SIGNIFICANT CHANGE UP (ref 3.8–10.5)

## 2020-10-29 RX ORDER — ASPIRIN/CALCIUM CARB/MAGNESIUM 324 MG
81 TABLET ORAL DAILY
Refills: 0 | Status: DISCONTINUED | OUTPATIENT
Start: 2020-10-29 | End: 2020-10-31

## 2020-10-29 RX ADMIN — CARVEDILOL PHOSPHATE 12.5 MILLIGRAM(S): 80 CAPSULE, EXTENDED RELEASE ORAL at 17:42

## 2020-10-29 RX ADMIN — TAMSULOSIN HYDROCHLORIDE 0.4 MILLIGRAM(S): 0.4 CAPSULE ORAL at 21:24

## 2020-10-29 RX ADMIN — Medication 325 MILLIGRAM(S): at 17:42

## 2020-10-29 RX ADMIN — CARVEDILOL PHOSPHATE 12.5 MILLIGRAM(S): 80 CAPSULE, EXTENDED RELEASE ORAL at 05:37

## 2020-10-29 RX ADMIN — PANTOPRAZOLE SODIUM 40 MILLIGRAM(S): 20 TABLET, DELAYED RELEASE ORAL at 05:38

## 2020-10-29 RX ADMIN — Medication 81 MILLIGRAM(S): at 18:12

## 2020-10-29 RX ADMIN — FINASTERIDE 5 MILLIGRAM(S): 5 TABLET, FILM COATED ORAL at 17:42

## 2020-10-29 NOTE — PROGRESS NOTE ADULT - SUBJECTIVE AND OBJECTIVE BOX
Patient is a 75y old  Male who presents with a chief complaint of weakness (23 Oct 2020 15:31)      SUBJECTIVE / OVERNIGHT EVENTS: No events overnight.   Patient feels ok.     T(C): 36.4 (10-29-20 @ 20:32), Max: 36.4 (10-29-20 @ 12:14)  HR: 67 (10-29-20 @ 20:32) (60 - 72)  BP: 112/73 (10-29-20 @ 20:32) (107/70 - 122/76)  RR: 18 (10-29-20 @ 20:32) (18 - 18)  SpO2: 97% (10-29-20 @ 20:32) (96% - 97%)    MEDICATIONS  (STANDING):  aspirin  chewable 81 milliGRAM(s) Oral daily  carvedilol 12.5 milliGRAM(s) Oral every 12 hours  ferrous    sulfate 325 milliGRAM(s) Oral daily  finasteride 5 milliGRAM(s) Oral daily  pantoprazole    Tablet 40 milliGRAM(s) Oral before breakfast  tamsulosin 0.4 milliGRAM(s) Oral at bedtime    MEDICATIONS  (PRN):  ondansetron Injectable 4 milliGRAM(s) IV Push every 6 hours PRN Nausea and/or Vomiting      PHYSICAL EXAM:  GENERAL: NAD, well-developed  HEAD:  Atraumatic, Normocephalic  EYES: EOMI, conjunctiva and sclera clear  NECK: Supple, No JVD  CHEST/LUNG: Clear to auscultation bilaterally; No wheeze  HEART: Regular rate and rhythm; No murmurs, rubs, or gallops  ABDOMEN: Soft, Nontender, Nondistended; Bowel sounds present  EXTREMITIES:  2+ Peripheral Pulses, No clubbing, cyanosis, or edema  PSYCH: AAOx3  NEUROLOGY: non-focal  SKIN: No rashes or lesions                                                    9.9    6.90  )-----------( 224      ( 29 Oct 2020 06:43 )             31.8             PT/INR - ( 28 Oct 2020 06:30 )   PT: 11.7 SEC;   INR: 1.02          PTT - ( 28 Oct 2020 06:30 )  PTT:34.1 SEC  137|104|41<94  4.5|19|3.72  8.8,1.9,4.6  10-29 @ 06:43                                CAPILLARY BLOOD GLUCOSE        RADIOLOGY & ADDITIONAL TESTS:    Imaging Personally Reviewed:    Consultant(s) Notes Reviewed:      Care Discussed with Consultants/Other Providers:

## 2020-10-29 NOTE — PROGRESS NOTE ADULT - ASSESSMENT
76yo M with pmhx cad, htn, bph, recent admission to [facility] for CAREN severe b/l hydro s/p gaston and was discharged home 2 days ago with gaston in place p/w nausea, vomiting cloudy urine and weakness  nephrology consulted for renal failure    Renal failure  recent CAREN sec to obstructive uropathy, scr peaked >9 and had improved and stable ~4.1 upon discharge  scr peaked 4.8 on this admission, renal function now improved and stabel ~3.7 possible baseline   Caren likely sec to prerenal vs obstruction   Gaston in place and is non oliguric  kidney US noted with right mild hydronephrosis improved, Left moderate hydronephrosis unchanged.  follow up  off IVF  monitor bmp, urine output  avoid nephrotoxic agents  renal dose medication     Patient is stable from renal for discharge. pt advise to follow up dr. Pérez in 1-2 weeks after discharge.     Proteinuria/hematuria  in setting of obstruction/ gaston  Monitor at present    Anemia  transfuse to keep hb>8  continue iron supplement  s/p GI work up  f/u GI  monitor    Hypomagnesemia   improved  supplement if needed    Hypocalcemia  better  elevated pth,  vit d 25 ok   monitor    Acidosis  likely lab error  repeat bmp is within limit  monitor for now

## 2020-10-29 NOTE — PROGRESS NOTE ADULT - SUBJECTIVE AND OBJECTIVE BOX
INTERVAL HPI/OVERNIGHT EVENTS:    feeling well   denies n/v/d/c, abdominal pain, melena or brbpr     MEDICATIONS  (STANDING):  carvedilol 12.5 milliGRAM(s) Oral every 12 hours  cefTRIAXone   IVPB 1000 milliGRAM(s) IV Intermittent every 24 hours  ferrous    sulfate 325 milliGRAM(s) Oral daily  finasteride 5 milliGRAM(s) Oral daily  pantoprazole    Tablet 40 milliGRAM(s) Oral before breakfast  tamsulosin 0.4 milliGRAM(s) Oral at bedtime    MEDICATIONS  (PRN):  ondansetron Injectable 4 milliGRAM(s) IV Push every 6 hours PRN Nausea and/or Vomiting      Allergies    penicillin (Rash)    Intolerances        Review of Systems:    General:  No wt loss, fevers, chills, night sweats, fatigue   Eyes:  Good vision, no reported pain  ENT:  No sore throat, pain, runny nose, dysphagia  CV:  No pain, palpitations, hypo/hypertension  Resp:  No dyspnea, cough, tachypnea, wheezing  GI:  No pain, No nausea, No vomiting, No diarrhea, No constipation, No weight loss, No fever, No pruritis, No rectal bleeding, No melena, No dysphagia  :  No pain, bleeding, incontinence, nocturia  Muscle:  No pain, weakness  Neuro:  No weakness, tingling, memory problems  Psych:  No fatigue, insomnia, mood problems, depression  Endocrine:  No polyuria, polydypsia, cold/heat intolerance  Heme:  No petechiae, ecchymosis, easy bruisability  Skin:  No rash, tattoos, scars, edema      Vital Signs Last 24 Hrs  T(C): 36.3 (29 Oct 2020 05:36), Max: 36.7 (28 Oct 2020 20:20)  T(F): 97.4 (29 Oct 2020 05:36), Max: 98.1 (28 Oct 2020 20:20)  HR: 64 (29 Oct 2020 05:36) (60 - 70)  BP: 121/76 (29 Oct 2020 05:36) (100/61 - 161/81)  BP(mean): --  RR: 18 (29 Oct 2020 05:36) (12 - 18)  SpO2: 97% (29 Oct 2020 05:36) (97% - 100%)    PHYSICAL EXAM:    Constitutional: NAD  HEENT: EOMI, throat clear  Neck: No LAD, supple  Respiratory: CTA and P  Cardiovascular: S1 and S2, RRR, no M  Gastrointestinal: BS+, soft, NT/ND, neg HSM,  Extremities: No peripheral edema, neg clubbing, cyanosis  Vascular: 2+ peripheral pulses  Neurological: A/O x 3, no focal deficits  Psychiatric: Normal mood, normal affect  Skin: No rashes      LABS:                        9.9    6.90  )-----------( 224      ( 29 Oct 2020 06:43 )             31.8     10-29    137  |  104  |  41<H>  ----------------------------<  94  4.5   |  19<L>  |  3.72<H>    Ca    8.8      29 Oct 2020 06:43  Phos  4.6     10-29  Mg     1.9     10-29      PT/INR - ( 28 Oct 2020 06:30 )   PT: 11.7 SEC;   INR: 1.02          PTT - ( 28 Oct 2020 06:30 )  PTT:34.1 SEC      RADIOLOGY & ADDITIONAL TESTS:    < from: Upper Endoscopy (10.28.20 @ 10:49) >    [facility]  _______________________________________________________________________________  Patient Name: Jacob Raymundo             Procedure Date: 10/28/2020 10:49 AM  MRN: [id number]                     Account Number: [id number]  YOB: 1945              Admit Type: Inpatient  Room: [facility] 02                         Gender: Male  Attending MD: Seun Cordon MD      _______________________________________________________________________________     Procedure:           Upper GI endoscopy  Indications:         Iron deficiency anemia  Providers:           Seun Cordon MD  Medicines:           Monitored Anesthesia Care  Complications:       No immediate complications.  Procedure:           After obtaining informed consent, the endoscope was                        passed under direct vision. Throughout the procedure,                        the patient's blood pressure, pulse, and oxygen                        saturations were monitored continuously. The Endoscope                        was introduced through the mouth, and advanced to the                        third part of duodenum. The upper GI endoscopy was                        accomplished without difficulty. The patient tolerated                   the procedure well.                                                                                   Findings:       The examined esophagus was normal.       A single 3 mm nodule was found at the gastroesophageal junction, 35 cm     from the incisors. Biopsies were taken with a cold forceps for histology.       Mild inflammation characterized by erosions and erythema was found in        the gastric body and in the gastric antrum. Biopsies were taken with a        cold forceps for Helicobacter pylori testing.       The examined duodenum was normal. Biopsies were taken with a cold        forceps for histology.                                                                                   Impression:          - Normal esophagus.                       - Nodule found in the esophagus. Biopsied.                       - Gastritis. Biopsied.                       - Normal examined duodenum. Biopsied.  Recommendation:      - Advance diet as tolerated.                       -Await pathology results.                       - [facility].                                                                                   Attending Participation:       I personally performed the entire procedure.                                                                                     Seun Cordon  ___________________  Seun Cordon MD  10/28/2020 11:47:01 AM  This report has been signed electronically.  Number of Addenda: 0    Note Initiated On: 10/28/2020 10:49 AM    < end of copied text >    < from: Colonoscopy (10.28.20 @ 10:47) >    [facility]  _______________________________________________________________________________  Patient Name: Jacob Raymundo             Procedure Date: 10/28/2020 10:47 AM  MRN: [id number]                     Account Number: [id number]  YOB: 1945              Admit Type: Inpatient  Room: [place] [address]                         Gender: Male  Attending MD: Seun Cordon MD      _______________________________________________________________________________     Procedure:           Colonoscopy  Indications:         Unexplained iron deficiency anemia  Providers:           Seun Cordon MD  Medicines:           Monitored Anesthesia Care  Complications:       No immediate complications.  Procedure:           After I obtained informed consent, the scope was passed                        under direct vision. Throughout the procedure, the                        patient's blood pressure, pulse, and oxygen saturations                        were monitored continuously. The Colonoscope was                        introduced through the anus and advanced to the terminal                        ileum. The colonoscopy was performed without difficulty.                        The patient tolerated the procedure well. The quality of                        the bowel preparation was excellent. The quality of the                        bowel preparation was evaluated using the BBPS (Covert                        Bowel Preparation Scale) with scores of: Right Colon =                      3, Transverse Colon = 3 and Left Colon = 3 (entire                        mucosa seen well with no residual staining, small                        fragments of stool or opaque liquid). The total BBPS                        score equals 9.                                                                                   Findings:       Non-bleeding internal hemorrhoids were found during retroflexion. The        hemorrhoids were small.       Multiple small-mouthed diverticula were found in the sigmoid colon,        descending colon, transverse colon and ascending colon.       A 5 mm polyp was found in the hepatic flexure. The polyp was sessile.        The polyp was removed with a cold snare. Resection and retrieval were    complete.       A 3 mm polyp was found in the transverse colon. The polyp was sessile.        The polyp was removed with a cold biopsy forceps. Resection and        retrieval were complete.                        Moderate Sedation:       Moderate (conscious) sedation was personally administered by an        anesthesia professional. The following parameters were monitored: oxygen        saturation, heart rate, blood pressure, respiratory rate, EKG, adequacy        of pulmonary ventilation, and response to care.  Impression:          - Non-bleeding internal hemorrhoids.                       - Diverticulosis in the sigmoid colon, in the descending                        colon, in the transverse colon and in the ascending                        colon.                       - One 5 mm polyp at the hepatic flexure, removed with a                        cold snare. Resected and retrieved.                       - One 3 mm polyp in the transverse colon, removed with a                        cold biopsy forceps. Resected and retrieved.  Recommendation:      - [facility] to [facility] for [facility] care.                       - Advance diet as tolerated.                       - Await pathology results.                       - Repeat colonoscopy in 5 years for surveillance.                       - Perform an upper GI endoscopy today.                                                                                   Attending Participation:       I personally performed the entire procedure.                                                                                     Seun Cordon  ___________________  Seun Cordon MD  10/28/2020 11:43:50 AM  This report has been signed electronically.  Number of Addenda: 0    Note Initiated On: 10/28/2020 10:47 AM    < end of copied text >

## 2020-10-29 NOTE — PROGRESS NOTE ADULT - PROBLEM SELECTOR PLAN 1
Hb stable s/p prbc, GI following.   No overt signs of bleeding.   Hold SQ Heparin.  s/p colonoscopy no evidence of bleeding

## 2020-10-29 NOTE — PROGRESS NOTE ADULT - SUBJECTIVE AND OBJECTIVE BOX
[facility] [place] [facility]   MD MOHIT CAMEJO DO ANAM SIDDIQUI ANGELA WONG, PA    TEL:  OFFICE: [phone]  DR KIM CELL: [phone]  DR. JACOB CELL: [phone]  DR. SINGH CELL: [phone]  GIBSON DSOUZA CELL: [phone]    From 5pm-7am Answering Service [phone]    -- RENAL FOLLOW UP NOTE ---Date of Service 10-29-20 @ 13:02    Patient is a 75y old  Male who presents with a chief complaint of problem with gaston (26 Oct 2020 11:08)      Patient seen and examined at bedside. No chest pain/sob    VITALS:  T(F): 97.6 (10-29-20 @ 12:14), Max: 98.1 (10-28-20 @ 20:20)  HR: 60 (10-29-20 @ 12:14)  BP: 107/70 (10-29-20 @ 12:14)  RR: 18 (10-29-20 @ 12:14)  SpO2: 96% (10-29-20 @ 12:14)  Wt(kg): --    10-28 @ 07:01  -  10-29 @ 07:00  --------------------------------------------------------  IN: 200 mL / OUT: 1250 mL / NET: -1050 mL          PHYSICAL EXAM:  Constitutional: NAD  Neck: No JVD  Respiratory: CTAB, no wheezes, rales or rhonchi  Cardiovascular: S1, S2, RRR  Gastrointestinal: BS+, soft, NT/ND  Extremities: No peripheral edema    Hospital Medications:   MEDICATIONS  (STANDING):  carvedilol 12.5 milliGRAM(s) Oral every 12 hours  ferrous    sulfate 325 milliGRAM(s) Oral daily  finasteride 5 milliGRAM(s) Oral daily  pantoprazole    Tablet 40 milliGRAM(s) Oral before breakfast  tamsulosin 0.4 milliGRAM(s) Oral at bedtime      LABS:  10-29    137  |  104  |  41<H>  ----------------------------<  94  4.5   |  19<L>  |  3.72<H>    Ca    8.8      29 Oct 2020 06:43  Phos  4.6     10-29  Mg     1.9     10-29      Creatinine Trend: 3.72 <--, 3.74 <--, 3.53 <--, 2.35 <--, 3.78 <--, 3.88 <--    Phosphorus Level, Serum: 4.6 mg/dL (10-29 @ 06:43)                              9.9    6.90  )-----------( 224      ( 29 Oct 2020 06:43 )             31.8     Urine Studies:  Urinalysis - [10-23-20 @ 20:05]      Color COLORLESS / Appearance Lt TURBID / SG 1.015 / pH 6.5      Gluc NEGATIVE / Ketone NEGATIVE  / Bili NEGATIVE / Urobili NORMAL       Blood LARGE / Protein 70 / Leuk Est LARGE / Nitrite NEGATIVE      RBC >50 / WBC 11-25 / Hyaline 1+ / Gran  / Sq Epi OCC / Non Sq Epi  / Bacteria FEW    Urine Creatinine 55.10      [10-23-20 @ 20:05]  Urine Sodium 102      [10-25-20 @ 13:25]  Urine Osmolality 421      [10-23-20 @ 20:05]    Iron 22, TIBC 170, %sat --      [10-24-20 @ 04:57]  Ferritin 587.2      [10-24-20 @ 04:57]  PTH 76.61 (Ca --)      [10-27-20 @ 05:41]   --  Vitamin D (25OH) 29.0      [10-27-20 @ 05:41]  TSH 1.01      [10-08-20 @ 02:55]    HCV 0.13, Nonreact      [10-08-20 @ 10:59]      RADIOLOGY & ADDITIONAL STUDIES:

## 2020-10-29 NOTE — PROGRESS NOTE ADULT - PROBLEM SELECTOR PLAN 1
No overt signs of bleeding   s/p EGD with gastritis and nodule, no bleeding; fu pathology   s/p Colonoscopy w/diverticulosis and polypectomy; fu pathology   cont protonix qd   no gi objection to asa per cardiology recs  consider other etiologies for anemia   high fiber diet

## 2020-10-29 NOTE — PROGRESS NOTE ADULT - PROBLEM SELECTOR PLAN 2
Gaston associated UTI / Acute pyelonephritis- Continue with Ceftriaxone. Urine cultures show E.coli, blood cultures show no growth to date.   Urology, Renal consults following. Bilateral moderate hydroureteronephrosis- last sono, post obstructive Uropathy. Continue with gaston, Flomax. Continue with Finasteride. PSA around 18, can be followed as out patient as per urology.

## 2020-10-30 ENCOUNTER — TRANSCRIPTION ENCOUNTER (OUTPATIENT)
Age: 75
End: 2020-10-30

## 2020-10-30 LAB
ANION GAP SERPL CALC-SCNC: 12 MMO/L — SIGNIFICANT CHANGE UP (ref 7–14)
APTT BLD: 31 SEC — SIGNIFICANT CHANGE UP (ref 27–36.3)
BUN SERPL-MCNC: 44 MG/DL — HIGH (ref 7–23)
CALCIUM SERPL-MCNC: 8.8 MG/DL — SIGNIFICANT CHANGE UP (ref 8.4–10.5)
CHLORIDE SERPL-SCNC: 108 MMOL/L — HIGH (ref 98–107)
CO2 SERPL-SCNC: 20 MMOL/L — LOW (ref 22–31)
CREAT SERPL-MCNC: 3.87 MG/DL — HIGH (ref 0.5–1.3)
GLUCOSE SERPL-MCNC: 94 MG/DL — SIGNIFICANT CHANGE UP (ref 70–99)
HCT VFR BLD CALC: 30.7 % — LOW (ref 39–50)
HGB BLD-MCNC: 9.5 G/DL — LOW (ref 13–17)
MAGNESIUM SERPL-MCNC: 1.9 MG/DL — SIGNIFICANT CHANGE UP (ref 1.6–2.6)
MCHC RBC-ENTMCNC: 28.1 PG — SIGNIFICANT CHANGE UP (ref 27–34)
MCHC RBC-ENTMCNC: 30.9 % — LOW (ref 32–36)
MCV RBC AUTO: 90.8 FL — SIGNIFICANT CHANGE UP (ref 80–100)
NRBC # FLD: 0 K/UL — SIGNIFICANT CHANGE UP (ref 0–0)
PHOSPHATE SERPL-MCNC: 4.6 MG/DL — HIGH (ref 2.5–4.5)
PLATELET # BLD AUTO: 218 K/UL — SIGNIFICANT CHANGE UP (ref 150–400)
PMV BLD: 10 FL — SIGNIFICANT CHANGE UP (ref 7–13)
POTASSIUM SERPL-MCNC: 4.4 MMOL/L — SIGNIFICANT CHANGE UP (ref 3.5–5.3)
POTASSIUM SERPL-SCNC: 4.4 MMOL/L — SIGNIFICANT CHANGE UP (ref 3.5–5.3)
RBC # BLD: 3.38 M/UL — LOW (ref 4.2–5.8)
RBC # FLD: 13.2 % — SIGNIFICANT CHANGE UP (ref 10.3–14.5)
SODIUM SERPL-SCNC: 140 MMOL/L — SIGNIFICANT CHANGE UP (ref 135–145)
WBC # BLD: 7.17 K/UL — SIGNIFICANT CHANGE UP (ref 3.8–10.5)
WBC # FLD AUTO: 7.17 K/UL — SIGNIFICANT CHANGE UP (ref 3.8–10.5)

## 2020-10-30 PROCEDURE — ZZZZZ: CPT

## 2020-10-30 RX ORDER — HEPARIN SODIUM 5000 [USP'U]/ML
3000 INJECTION INTRAVENOUS; SUBCUTANEOUS EVERY 6 HOURS
Refills: 0 | Status: DISCONTINUED | OUTPATIENT
Start: 2020-10-30 | End: 2020-10-31

## 2020-10-30 RX ORDER — PANTOPRAZOLE SODIUM 20 MG/1
1 TABLET, DELAYED RELEASE ORAL
Qty: 30 | Refills: 0
Start: 2020-10-30 | End: 2020-11-28

## 2020-10-30 RX ORDER — DILTIAZEM HCL 120 MG
10 CAPSULE, EXT RELEASE 24 HR ORAL ONCE
Refills: 0 | Status: COMPLETED | OUTPATIENT
Start: 2020-10-30 | End: 2020-10-30

## 2020-10-30 RX ORDER — CARVEDILOL PHOSPHATE 80 MG/1
25 CAPSULE, EXTENDED RELEASE ORAL EVERY 12 HOURS
Refills: 0 | Status: DISCONTINUED | OUTPATIENT
Start: 2020-10-30 | End: 2020-11-02

## 2020-10-30 RX ORDER — HEPARIN SODIUM 5000 [USP'U]/ML
INJECTION INTRAVENOUS; SUBCUTANEOUS
Qty: 25000 | Refills: 0 | Status: DISCONTINUED | OUTPATIENT
Start: 2020-10-30 | End: 2020-10-31

## 2020-10-30 RX ORDER — CARVEDILOL PHOSPHATE 80 MG/1
1 CAPSULE, EXTENDED RELEASE ORAL
Qty: 60 | Refills: 0
Start: 2020-10-30 | End: 2020-11-28

## 2020-10-30 RX ORDER — DILTIAZEM HCL 120 MG
10 CAPSULE, EXT RELEASE 24 HR ORAL ONCE
Refills: 0 | Status: DISCONTINUED | OUTPATIENT
Start: 2020-10-30 | End: 2020-10-30

## 2020-10-30 RX ORDER — FERROUS SULFATE 325(65) MG
1 TABLET ORAL
Qty: 30 | Refills: 0
Start: 2020-10-30 | End: 2020-11-28

## 2020-10-30 RX ORDER — LABETALOL HCL 100 MG
1 TABLET ORAL
Qty: 0 | Refills: 0 | DISCHARGE

## 2020-10-30 RX ORDER — HEPARIN SODIUM 5000 [USP'U]/ML
6500 INJECTION INTRAVENOUS; SUBCUTANEOUS EVERY 6 HOURS
Refills: 0 | Status: DISCONTINUED | OUTPATIENT
Start: 2020-10-30 | End: 2020-10-31

## 2020-10-30 RX ADMIN — HEPARIN SODIUM 1500 UNIT(S)/HR: 5000 INJECTION INTRAVENOUS; SUBCUTANEOUS at 19:09

## 2020-10-30 RX ADMIN — Medication 325 MILLIGRAM(S): at 17:36

## 2020-10-30 RX ADMIN — TAMSULOSIN HYDROCHLORIDE 0.4 MILLIGRAM(S): 0.4 CAPSULE ORAL at 21:26

## 2020-10-30 RX ADMIN — FINASTERIDE 5 MILLIGRAM(S): 5 TABLET, FILM COATED ORAL at 17:36

## 2020-10-30 RX ADMIN — CARVEDILOL PHOSPHATE 25 MILLIGRAM(S): 80 CAPSULE, EXTENDED RELEASE ORAL at 17:32

## 2020-10-30 RX ADMIN — PANTOPRAZOLE SODIUM 40 MILLIGRAM(S): 20 TABLET, DELAYED RELEASE ORAL at 05:28

## 2020-10-30 RX ADMIN — CARVEDILOL PHOSPHATE 12.5 MILLIGRAM(S): 80 CAPSULE, EXTENDED RELEASE ORAL at 05:28

## 2020-10-30 RX ADMIN — Medication 81 MILLIGRAM(S): at 17:36

## 2020-10-30 RX ADMIN — Medication 10 MILLIGRAM(S): at 16:14

## 2020-10-30 NOTE — PROGRESS NOTE ADULT - SUBJECTIVE AND OBJECTIVE BOX
INTERVAL HPI/OVERNIGHT EVENTS:    denies n/v/d/c, abdominal pain, melena or brbpr     MEDICATIONS  (STANDING):  aspirin  chewable 81 milliGRAM(s) Oral daily  carvedilol 12.5 milliGRAM(s) Oral every 12 hours  ferrous    sulfate 325 milliGRAM(s) Oral daily  finasteride 5 milliGRAM(s) Oral daily  pantoprazole    Tablet 40 milliGRAM(s) Oral before breakfast  tamsulosin 0.4 milliGRAM(s) Oral at bedtime    MEDICATIONS  (PRN):  ondansetron Injectable 4 milliGRAM(s) IV Push every 6 hours PRN Nausea and/or Vomiting      Allergies    penicillin (Rash)    Intolerances        Review of Systems:    General:  No wt loss, fevers, chills, night sweats, fatigue   Eyes:  Good vision, no reported pain  ENT:  No sore throat, pain, runny nose, dysphagia  CV:  No pain, palpitations, hypo/hypertension  Resp:  No dyspnea, cough, tachypnea, wheezing  GI:  No pain, No nausea, No vomiting, No diarrhea, No constipation, No weight loss, No fever, No pruritis, No rectal bleeding, No melena, No dysphagia  :  No pain, bleeding, incontinence, nocturia  Muscle:  No pain, weakness  Neuro:  No weakness, tingling, memory problems  Psych:  No fatigue, insomnia, mood problems, depression  Endocrine:  No polyuria, polydypsia, cold/heat intolerance  Heme:  No petechiae, ecchymosis, easy bruisability  Skin:  No rash, tattoos, scars, edema      Vital Signs Last 24 Hrs  T(C): 36.9 (30 Oct 2020 05:27), Max: 36.9 (30 Oct 2020 05:27)  T(F): 98.5 (30 Oct 2020 05:27), Max: 98.5 (30 Oct 2020 05:27)  HR: 69 (30 Oct 2020 05:27) (60 - 72)  BP: 135/72 (30 Oct 2020 05:27) (107/70 - 135/72)  BP(mean): --  RR: 18 (30 Oct 2020 05:27) (18 - 18)  SpO2: 99% (30 Oct 2020 05:27) (96% - 99%)    PHYSICAL EXAM:    Constitutional: NAD  HEENT: EOMI, throat clear  Neck: No LAD, supple  Respiratory: CTA and P  Cardiovascular: S1 and S2, RRR, no M  Gastrointestinal: BS+, soft, NT/ND, neg HSM,  Extremities: No peripheral edema, neg clubbing, cyanosis  Vascular: 2+ peripheral pulses  Neurological: A/O x 3, no focal deficits  Psychiatric: Normal mood, normal affect  Skin: No rashes      LABS:                        9.5    7.17  )-----------( 218      ( 30 Oct 2020 05:59 )             30.7     10-30    140  |  108<H>  |  44<H>  ----------------------------<  94  4.4   |  20<L>  |  3.87<H>    Ca    8.8      30 Oct 2020 05:59  Phos  4.6     10-30  Mg     1.9     10-30            RADIOLOGY & ADDITIONAL TESTS:    Surgical Pathology Report (10.28.20 @ 17:51)    Surgical Pathology Report:   ACCESSION No:  [id number]    MORGAN NICHOLSON                          2        Surgical Final Report          Final Diagnosis  1. Colon, transverse, polyp, biopsy  - Tubular adenoma.    2. Colon, hepatic flexure, polyp, biopsy  - Tubular adenoma.    3. Duodenum, small bowel, biopsy  - Duodenal mucosa with no significant diagnostic  alterations.    4. Stomach, antrum/body, biopsy  - Gastric oxyntic mucosa with mild chronic inactive  gastritis.  - Negative for Helicobacter microorganisms (special stain).  - Negative for intestinal metaplasia.    5. GE junction, nodularity, biopsy  - Squamous and cardiac type mucosa with features consistent  with reflux esophagitis  and lymphoid aggregate.  - Negative for intestinal metaplasia or dysplasia.    Verified by: Colleen Villa M.D.  (Electronic Signature)  Reported on: 10/29/20 17:01 EDT, [address] [place] [address]  Phone: [phone]   Fax: [phone]  _________________________________________________________________    Clinical History  not provided    Specimen(s) Submitted  1- Transverse colon polyp bx  2- Hepatic flexure polyp  3- Duodenum, small bowel bx  4- Antrum/body bx  5- GE Junction nodularity bx    Gross Description  1. Received in formalin and labeled "transverse colon polyp  biopsy" is a 0.3 cm in greatest dimension tan-pink soft tissue  fragment. Entirely in one cassette.    2. Received in formalin and labeled "hepatic flexure polyp" are  three, tan-pink soft tissue fragments ranging from 0.4 cm to 0.6  cm in greatest dimension. Entirely in one cassette.            MORGAN NICHOLSON                          2        Surgical Final Report            3. Received in formalin and labeled "duodenal small bowel biopsy"  is a 0.3 cm in greatest dimension tan-pink soft tissue fragment.  Entirely in one cassette.    4. Received in formalin and labeled "antrum body" are four, tan-  pink soft tissue fragments ranging from 0.1 cm to 0.5 cm in  greatest dimension. H pylori staiing requested.  Entirely in one  cassette.    5. Received in formalin and labeled "GE junction biopsy" is a 0.3  cm in greatest dimension tan-pink soft tissue fragment. Entirely  in one cassette.    Mckenzie Delgado 10/28/2020 06:26 PM    Disclaimer  In addition to other data thatmay appear on the specimen  containers, all labels have been inspected to confirm the  presence of the patient's name and date of birth.    Histological Processing Performed at [facility], Department of Pathology, [address] [place] [address].      Surgical Consult Report          Disclaimer  In addition to other data that may appear on the specimen  containers, all labels have been inspected to confirm the  presence of the patient's name and date of birth.    Histological Processing Performed at [facility], Department of Pathology, [address] [place] [address].

## 2020-10-30 NOTE — PROGRESS NOTE ADULT - ASSESSMENT
76yo M with pmhx cad, htn, bph, recent admission to [facility] for CAREN severe b/l hydro s/p gaston and was discharged home 2 days ago with gaston in place p/w nausea, vomiting cloudy urine and weakness  nephrology consulted for renal failure    Renal failure  recent CAREN sec to obstructive uropathy, scr peaked >9 and had improved and stable ~4.1 upon discharge  scr peaked 4.8 on this admission, renal function now improved and stabel ~3.7 possible baseline   Caren likely sec to prerenal vs obstruction   Gaston in place and is non oliguric  kidney US noted with right mild hydronephrosis improved, Left moderate hydronephrosis unchanged.  follow up  off IVF  monitor bmp, urine output  avoid nephrotoxic agents  renal dose medication     Patient is stable from renal for discharge. pt advise to follow up dr. Pérez in 1-2 weeks after discharge.     Proteinuria/hematuria  in setting of obstruction/ gaston  Monitor at present    Anemia  transfuse to keep hb>8  continue iron supplement  s/p GI work up  f/u GI  monitor    Hypomagnesemia   improved  supplement if needed    Hypocalcemia  better  elevated pth,  vit d 25 ok   monitor    Acidosis  likely lab error  repeat bmp is within limit  monitor for now

## 2020-10-30 NOTE — PROGRESS NOTE ADULT - SUBJECTIVE AND OBJECTIVE BOX
Cardiovascular Disease Progress Note    Overnight events: No acute events overnight. Patient denies chest pain or SOB.   Otherwise review of systems negative    Objective Findings:  T(C): 36.9 (10-30-20 @ 05:27), Max: 36.9 (10-30-20 @ 05:27)  HR: 69 (10-30-20 @ 05:27) (60 - 72)  BP: 135/72 (10-30-20 @ 05:27) (107/70 - 135/72)  RR: 18 (10-30-20 @ 05:27) (18 - 18)  SpO2: 99% (10-30-20 @ 05:27) (96% - 99%)  Wt(kg): --  Daily     Daily       Physical Exam:  Gen: NAD; Patient resting comfortably  HEENT: EOMI, Normocephalic/ atraumatic  CV: RRR, normal S1 + S2, no m/r/g  Lungs:  Normal respiratory effort; clear to auscultation bilaterally  Abd: soft, non-tender; bowel sounds present  Ext: No edema; warm and well perfused    Telemetry: Sinus    Laboratory Data:                        9.5    7.17  )-----------( 218      ( 30 Oct 2020 05:59 )             30.7     10-30    140  |  108<H>  |  44<H>  ----------------------------<  94  4.4   |  20<L>  |  3.87<H>    Ca    8.8      30 Oct 2020 05:59  Phos  4.6     10-30  Mg     1.9     10-30                Inpatient Medications:  MEDICATIONS  (STANDING):  aspirin  chewable 81 milliGRAM(s) Oral daily  carvedilol 12.5 milliGRAM(s) Oral every 12 hours  ferrous    sulfate 325 milliGRAM(s) Oral daily  finasteride 5 milliGRAM(s) Oral daily  pantoprazole    Tablet 40 milliGRAM(s) Oral before breakfast  tamsulosin 0.4 milliGRAM(s) Oral at bedtime      Assessment:  75 year old man with CAD s/p PCI several years ago, HTN, and BPH presents with UTI and a-fib with RVR    Plan of Care:    #A-fib with RVR-  Patient is maintaining sinus.   Continue Coreg as ordered despite sinus bradycardia while sleeping.   Given his recurrent anemia of unclear etiology, I would hold off on anticoagulation at this time.  Echo from earlier this month reviewed- preserved LVEF with no hemodynamically significant valvular disease.    #HTN-  BP acceptable on current regimen.     #CAD s/p PCI-  PCI several years ago.   No bleeding stigmata found on EGD or colonoscopy.  Continue low dose ASA.      Over 25 minutes spent on total encounter; more than 50% of the visit was spent counseling and/or coordinating care by the attending physician.      Lionel Miller MD [facility]  Cardiovascular Disease  [phone]

## 2020-10-30 NOTE — CHART NOTE - NSCHARTNOTEFT_GEN_A_CORE
Notified by RN HR 150s on tele monitor, EKG performed a-fib with RVR rate varying 150s on telemetry. patient symptomatic with palpitations vital signs documented.  Cardizem 10mg IV given with response. Discussed with Dr. Miller and Dr. Armenta will start on heparin drip and monitor for bleeding, coreg increased to 25mg BID Notified by RN HR 150s on tele monitor, EKG performed a-fib with RVR rate varying 150s on telemetry. patient symptomatic with palpitations vital signs documented.  Cardizem 10mg IV given with response. Discussed with Dr. Miller and Dr. Armenta will start on heparin drip and monitor for bleeding, coreg increased to 25mg BID.  Patient was pending discharge, discharge cancelled Notified by RN HR 150s on tele monitor, EKG performed a-fib with RVR rate varying 150s on telemetry. patient symptomatic with palpitations vital signs documented.  Cardizem 10mg IV given with response. Discussed with Dr. Miller and Dr. Armenta will start on heparin drip and monitor for bleeding, coreg increased to 25mg BID.  Patient was pending discharge, discharge cancelled    Patient may have sinus bradycardia @ Night per Dr. Miller do not hold beta blocker as long as patient asleep, asymptomatic and hemodynamically stable

## 2020-10-30 NOTE — PROVIDER CONTACT NOTE (OTHER) - BACKGROUND
Pt admitted for Afib w/RVR to 170's, pt was clear for D/C this morning, waiting for his son to pick him up home, tele tech noted pt HR 156bpm

## 2020-10-30 NOTE — DISCHARGE NOTE NURSING/CASE MANAGEMENT/SOCIAL WORK - PATIENT PORTAL LINK FT
You can access the FollowMyHealth Patient Portal offered by [facility] by registering at the following website: [email or web address]. By joining [employer]’s FollowMyHealth portal, you will also be able to view your health information using other applications (apps) compatible with our system.

## 2020-10-30 NOTE — PROGRESS NOTE ADULT - PROBLEM SELECTOR PLAN 1
Hb stable s/p prbc, GI following.   No overt signs of bleeding.   Hold SQ Heparin.  s/p colonoscopy no evidence of bleeding  D/C planning today.

## 2020-10-30 NOTE — PROGRESS NOTE ADULT - SUBJECTIVE AND OBJECTIVE BOX
Patient is a 75y old  Male who presents with a chief complaint of weakness (23 Oct 2020 15:31)      SUBJECTIVE / OVERNIGHT EVENTS: No events overnight.   Patient feels ok.     T(C): 36.8 (10-30-20 @ 12:00), Max: 36.9 (10-30-20 @ 05:27)  HR: 67 (10-30-20 @ 12:00) (67 - 69)  BP: 139/78 (10-30-20 @ 12:00) (135/72 - 139/78)  RR: 18 (10-30-20 @ 12:00) (18 - 18)  SpO2: 98% (10-30-20 @ 12:00) (98% - 99%)    MEDICATIONS  (STANDING):  aspirin  chewable 81 milliGRAM(s) Oral daily  carvedilol 12.5 milliGRAM(s) Oral every 12 hours  ferrous    sulfate 325 milliGRAM(s) Oral daily  finasteride 5 milliGRAM(s) Oral daily  pantoprazole    Tablet 40 milliGRAM(s) Oral before breakfast  tamsulosin 0.4 milliGRAM(s) Oral at bedtime    MEDICATIONS  (PRN):  ondansetron Injectable 4 milliGRAM(s) IV Push every 6 hours PRN Nausea and/or Vomiting    PHYSICAL EXAM:  GENERAL: NAD, well-developed  HEAD:  Atraumatic, Normocephalic  EYES: EOMI, conjunctiva and sclera clear  NECK: Supple, No JVD  CHEST/LUNG: Clear to auscultation bilaterally; No wheeze  HEART: Regular rate and rhythm; No murmurs, rubs, or gallops  ABDOMEN: Soft, Nontender, Nondistended; Bowel sounds present  EXTREMITIES:  2+ Peripheral Pulses, No clubbing, cyanosis, or edema  PSYCH: AAOx3  NEUROLOGY: non-focal  SKIN: No rashes or lesions                                                                     9.5    7.17  )-----------( 218      ( 30 Oct 2020 05:59 )             30.7               140|108|44<94  4.4|20|3.87  8.8,1.9,4.6  10-30 @ 05:59           CAPILLARY BLOOD GLUCOSE        RADIOLOGY & ADDITIONAL TESTS:    Imaging Personally Reviewed:    Consultant(s) Notes Reviewed:      Care Discussed with Consultants/Other Providers:

## 2020-10-30 NOTE — PROGRESS NOTE ADULT - PROBLEM SELECTOR PROBLEM 5
SIRIA   Essential hypertension

## 2020-10-30 NOTE — PROGRESS NOTE ADULT - PROBLEM SELECTOR PLAN 3
Garcia associated UTI / Acute pyelonephritis- Completed Ceftriaxone. Urine cultures shows E.coli, pan sensitive, blood cultures no growth to date.

## 2020-10-30 NOTE — PROGRESS NOTE ADULT - SUBJECTIVE AND OBJECTIVE BOX
[facility]   MD MOHIT CAMEJO DO ANAM SIDDIQUI ANGELA WONG, PA    TEL:  OFFICE: [phone]  DR KIM CELL: [phone]  DR. JACOB CELL: [phone]  DR. SINGH CELL: [phone]  GIBSON DSOUZA CELL: [phone]    From 5pm-7am Answering Service [phone]    -- RENAL FOLLOW UP NOTE ---Date of Service 10-30-20 @ 12:55    Patient is a 75y old  Male who presents with a chief complaint of problem with gaston (26 Oct 2020 11:08)      Patient seen and examined at bedside. No chest pain/sob    VITALS:  T(F): 98.2 (10-30-20 @ 12:00), Max: 98.5 (10-30-20 @ 05:27)  HR: 67 (10-30-20 @ 12:00)  BP: 139/78 (10-30-20 @ 12:00)  RR: 18 (10-30-20 @ 12:00)  SpO2: 98% (10-30-20 @ 12:00)  Wt(kg): --    10-29 @ 07:01  -  10-30 @ 07:00  --------------------------------------------------------  IN: 0 mL / OUT: 400 mL / NET: -400 mL          PHYSICAL EXAM:  Constitutional: NAD  Neck: No JVD  Respiratory: CTAB, no wheezes, rales or rhonchi  Cardiovascular: S1, S2, RRR  Gastrointestinal: BS+, soft, NT/ND  Extremities: No peripheral edema    Hospital Medications:   MEDICATIONS  (STANDING):  aspirin  chewable 81 milliGRAM(s) Oral daily  carvedilol 12.5 milliGRAM(s) Oral every 12 hours  ferrous    sulfate 325 milliGRAM(s) Oral daily  finasteride 5 milliGRAM(s) Oral daily  pantoprazole    Tablet 40 milliGRAM(s) Oral before breakfast  tamsulosin 0.4 milliGRAM(s) Oral at bedtime      LABS:  10-30    140  |  108<H>  |  44<H>  ----------------------------<  94  4.4   |  20<L>  |  3.87<H>    Ca    8.8      30 Oct 2020 05:59  Phos  4.6     10-30  Mg     1.9     10-30      Creatinine Trend: 3.87 <--, 3.72 <--, 3.74 <--, 3.53 <--, 2.35 <--, 3.78 <--, 3.88 <--    Phosphorus Level, Serum: 4.6 mg/dL (10-30 @ 05:59)                              9.5    7.17  )-----------( 218      ( 30 Oct 2020 05:59 )             30.7     Urine Studies:  Urinalysis - [10-23-20 @ 20:05]      Color COLORLESS / Appearance Lt TURBID / SG 1.015 / pH 6.5      Gluc NEGATIVE / Ketone NEGATIVE  / Bili NEGATIVE / Urobili NORMAL       Blood LARGE / Protein 70 / Leuk Est LARGE / Nitrite NEGATIVE      RBC >50 / WBC 11-25 / Hyaline 1+ / Gran  / Sq Epi OCC / Non Sq Epi  / Bacteria FEW    Urine Creatinine 55.10      [10-23-20 @ 20:05]  Urine Sodium 102      [10-25-20 @ 13:25]  Urine Osmolality 421      [10-23-20 @ 20:05]    Iron 22, TIBC 170, %sat --      [10-24-20 @ 04:57]  Ferritin 587.2      [10-24-20 @ 04:57]  PTH 76.61 (Ca --)      [10-27-20 @ 05:41]   --  Vitamin D (25OH) 29.0      [10-27-20 @ 05:41]  TSH 1.01      [10-08-20 @ 02:55]    HCV 0.13, Nonreact      [10-08-20 @ 10:59]      RADIOLOGY & ADDITIONAL STUDIES:

## 2020-10-30 NOTE — PROGRESS NOTE ADULT - PROBLEM SELECTOR PLAN 1
No overt signs of bleeding   s/p EGD with gastritis and nodule, no bleeding  s/p Colonoscopy w/diverticulosis and polypectomy  repeat Colonoscopy in 3 years given tubular adenomas on pathology   cont protonix qd   no gi objection to asa per cardiology recs  consider other etiologies for anemia   high fiber diet

## 2020-10-30 NOTE — PROVIDER CONTACT NOTE (OTHER) - ASSESSMENT
Pt denies chest pain, SOB, pt stated that he feels different then this morning, with occasional palpitation, vital sign per flow sheet.

## 2020-10-30 NOTE — DIETITIAN INITIAL EVALUATION ADULT. - PERTINENT LABORATORY DATA
(10/30) phosphorus 4.6 H, H/H 9.5/30.7 H, Cl 108 H, BUN 44 H, Creat 3.81 H;             (10/26) Albumin 1.9 L

## 2020-10-30 NOTE — DIETITIAN INITIAL EVALUATION ADULT. - ADD RECOMMEND
1. Encourage & assist Pt with meals; Monitor PO diet tolerance; Honor food preferences;          2. Rec: Nephro-yony daily for micronutrient coverage;              3. Monitor labs, hydration status;

## 2020-10-30 NOTE — DIETITIAN INITIAL EVALUATION ADULT. - OTHER INFO
Pt 76 yo male with admit diagnosis: Sepsis - per chart review. At time of visit Pt appears alert, oriented. Per Pt his appetite good. No report of chewing or swallowing difficulty; no report of nausea, vomiting or diarrhea @ this time.     Plan for Pt to get discharged today; no food related concerns voiced @ present. RDN remains available.

## 2020-10-30 NOTE — PROVIDER CONTACT NOTE (OTHER) - ACTION/TREATMENT ORDERED:
MIKHAIL Larsen at the bedside assessing the pt, EKG in chart, Cardizem IVP given, will continue monitoring the pt.

## 2020-10-31 DIAGNOSIS — I48.91 UNSPECIFIED ATRIAL FIBRILLATION: ICD-10-CM

## 2020-10-31 LAB
ANION GAP SERPL CALC-SCNC: 13 MMO/L — SIGNIFICANT CHANGE UP (ref 7–14)
APTT BLD: 184.7 SEC — CRITICAL HIGH (ref 27–36.3)
APTT BLD: 96.8 SEC — HIGH (ref 27–36.3)
BUN SERPL-MCNC: 47 MG/DL — HIGH (ref 7–23)
CALCIUM SERPL-MCNC: 8.5 MG/DL — SIGNIFICANT CHANGE UP (ref 8.4–10.5)
CHLORIDE SERPL-SCNC: 108 MMOL/L — HIGH (ref 98–107)
CO2 SERPL-SCNC: 18 MMOL/L — LOW (ref 22–31)
CREAT SERPL-MCNC: 3.79 MG/DL — HIGH (ref 0.5–1.3)
GLUCOSE SERPL-MCNC: 97 MG/DL — SIGNIFICANT CHANGE UP (ref 70–99)
HCT VFR BLD CALC: 28.5 % — LOW (ref 39–50)
HCT VFR BLD CALC: 29.5 % — LOW (ref 39–50)
HGB BLD-MCNC: 9.2 G/DL — LOW (ref 13–17)
HGB BLD-MCNC: 9.5 G/DL — LOW (ref 13–17)
MAGNESIUM SERPL-MCNC: 1.8 MG/DL — SIGNIFICANT CHANGE UP (ref 1.6–2.6)
MCHC RBC-ENTMCNC: 27.8 PG — SIGNIFICANT CHANGE UP (ref 27–34)
MCHC RBC-ENTMCNC: 27.9 PG — SIGNIFICANT CHANGE UP (ref 27–34)
MCHC RBC-ENTMCNC: 32.2 % — SIGNIFICANT CHANGE UP (ref 32–36)
MCHC RBC-ENTMCNC: 32.3 % — SIGNIFICANT CHANGE UP (ref 32–36)
MCV RBC AUTO: 86.1 FL — SIGNIFICANT CHANGE UP (ref 80–100)
MCV RBC AUTO: 86.8 FL — SIGNIFICANT CHANGE UP (ref 80–100)
NRBC # FLD: 0 K/UL — SIGNIFICANT CHANGE UP (ref 0–0)
NRBC # FLD: 0 K/UL — SIGNIFICANT CHANGE UP (ref 0–0)
PHOSPHATE SERPL-MCNC: 4.4 MG/DL — SIGNIFICANT CHANGE UP (ref 2.5–4.5)
PLATELET # BLD AUTO: 222 K/UL — SIGNIFICANT CHANGE UP (ref 150–400)
PLATELET # BLD AUTO: 244 K/UL — SIGNIFICANT CHANGE UP (ref 150–400)
PMV BLD: 10 FL — SIGNIFICANT CHANGE UP (ref 7–13)
PMV BLD: 9.9 FL — SIGNIFICANT CHANGE UP (ref 7–13)
POTASSIUM SERPL-MCNC: 4.3 MMOL/L — SIGNIFICANT CHANGE UP (ref 3.5–5.3)
POTASSIUM SERPL-SCNC: 4.3 MMOL/L — SIGNIFICANT CHANGE UP (ref 3.5–5.3)
RBC # BLD: 3.31 M/UL — LOW (ref 4.2–5.8)
RBC # BLD: 3.4 M/UL — LOW (ref 4.2–5.8)
RBC # FLD: 13.2 % — SIGNIFICANT CHANGE UP (ref 10.3–14.5)
RBC # FLD: 13.2 % — SIGNIFICANT CHANGE UP (ref 10.3–14.5)
SODIUM SERPL-SCNC: 139 MMOL/L — SIGNIFICANT CHANGE UP (ref 135–145)
WBC # BLD: 6.66 K/UL — SIGNIFICANT CHANGE UP (ref 3.8–10.5)
WBC # BLD: 7.97 K/UL — SIGNIFICANT CHANGE UP (ref 3.8–10.5)
WBC # FLD AUTO: 6.66 K/UL — SIGNIFICANT CHANGE UP (ref 3.8–10.5)
WBC # FLD AUTO: 7.97 K/UL — SIGNIFICANT CHANGE UP (ref 3.8–10.5)

## 2020-10-31 RX ORDER — APIXABAN 2.5 MG/1
5 TABLET, FILM COATED ORAL
Refills: 0 | Status: DISCONTINUED | OUTPATIENT
Start: 2020-10-31 | End: 2020-11-05

## 2020-10-31 RX ADMIN — FINASTERIDE 5 MILLIGRAM(S): 5 TABLET, FILM COATED ORAL at 17:36

## 2020-10-31 RX ADMIN — TAMSULOSIN HYDROCHLORIDE 0.4 MILLIGRAM(S): 0.4 CAPSULE ORAL at 21:50

## 2020-10-31 RX ADMIN — HEPARIN SODIUM 1200 UNIT(S)/HR: 5000 INJECTION INTRAVENOUS; SUBCUTANEOUS at 11:26

## 2020-10-31 RX ADMIN — APIXABAN 5 MILLIGRAM(S): 2.5 TABLET, FILM COATED ORAL at 17:37

## 2020-10-31 RX ADMIN — Medication 325 MILLIGRAM(S): at 17:37

## 2020-10-31 RX ADMIN — HEPARIN SODIUM 1500 UNIT(S)/HR: 5000 INJECTION INTRAVENOUS; SUBCUTANEOUS at 03:20

## 2020-10-31 RX ADMIN — CARVEDILOL PHOSPHATE 25 MILLIGRAM(S): 80 CAPSULE, EXTENDED RELEASE ORAL at 17:37

## 2020-10-31 RX ADMIN — CARVEDILOL PHOSPHATE 25 MILLIGRAM(S): 80 CAPSULE, EXTENDED RELEASE ORAL at 05:28

## 2020-10-31 RX ADMIN — HEPARIN SODIUM 0 UNIT(S)/HR: 5000 INJECTION INTRAVENOUS; SUBCUTANEOUS at 10:08

## 2020-10-31 RX ADMIN — PANTOPRAZOLE SODIUM 40 MILLIGRAM(S): 20 TABLET, DELAYED RELEASE ORAL at 05:28

## 2020-10-31 NOTE — PROGRESS NOTE ADULT - SUBJECTIVE AND OBJECTIVE BOX
Cardiovascular Disease Progress Note    Overnight events: Rapid a-fib noted yesterday afternoon. Currently patient denies chest pain or SOB.   Otherwise review of systems negative    Objective Findings:  T(C): 36.7 (10-31-20 @ 05:27), Max: 36.8 (10-30-20 @ 12:00)  HR: 68 (10-31-20 @ 05:27) (67 - 153)  BP: 138/73 (10-31-20 @ 05:27) (106/69 - 144/103)  RR: 18 (10-31-20 @ 05:27) (18 - 18)  SpO2: 99% (10-31-20 @ 05:27) (98% - 99%)  Wt(kg): --  Daily     Daily       Physical Exam:  Gen: NAD; Patient resting comfortably  HEENT: EOMI, Normocephalic/ atraumatic  CV: RRR, normal S1 + S2, no m/r/g  Lungs:  Normal respiratory effort; clear to auscultation bilaterally  Abd: soft, non-tender; bowel sounds present  Ext: No edema; warm and well perfused    Telemetry: Sinus    Laboratory Data:                        9.2    7.97  )-----------( 244      ( 31 Oct 2020 05:45 )             28.5     10-31    139  |  108<H>  |  47<H>  ----------------------------<  97  4.3   |  18<L>  |  3.79<H>    Ca    8.5      31 Oct 2020 05:45  Phos  4.4     10-31  Mg     1.8     10-31      PTT - ( 31 Oct 2020 09:00 )  PTT:184.7 SEC          Inpatient Medications:  MEDICATIONS  (STANDING):  carvedilol 25 milliGRAM(s) Oral every 12 hours  ferrous    sulfate 325 milliGRAM(s) Oral daily  finasteride 5 milliGRAM(s) Oral daily  heparin  Infusion.  Unit(s)/Hr (15 mL/Hr) IV Continuous <Continuous>  pantoprazole    Tablet 40 milliGRAM(s) Oral before breakfast  tamsulosin 0.4 milliGRAM(s) Oral at bedtime      Assessment: 75 year old man with CAD s/p PCI several years ago, HTN, and BPH presents with UTI and a-fib with RVR    Plan of Care:    #A-fib with RVR-  Rapid a-fib noted yesterday afternoon.  Continue Coreg as ordered despite sinus bradycardia while sleeping.   No obvious source of bleeding noted on EGD/colonoscopy.  Hb stable this morning.  May transition to Eliquis 5 mg PO BID.   Echo from earlier this month reviewed- preserved LVEF with no hemodynamically significant valvular disease.    #HTN-  BP acceptable on current regimen.     #CAD s/p PCI-  PCI several years ago.   I will stop ASA now that patient is on full dose AC for a-fib and has a history of anemia.       Over 25 minutes spent on total encounter; more than 50% of the visit was spent counseling and/or coordinating care by the attending physician.      Lionel Miller MD [facility]  Cardiovascular Disease  [phone]

## 2020-10-31 NOTE — PROGRESS NOTE ADULT - SUBJECTIVE AND OBJECTIVE BOX
Patient is a 75y old  Male who presents with a chief complaint of weakness (23 Oct 2020 15:31)      SUBJECTIVE / OVERNIGHT EVENTS: No events overnight.   Patient feels ok.     T(C): 36.7 (10-31-20 @ 13:17), Max: 36.7 (10-31-20 @ 13:17)  HR: 64 (10-31-20 @ 13:17) (64 - 64)  BP: 121/76 (10-31-20 @ 13:17) (121/76 - 121/76)  RR: 18 (10-31-20 @ 13:17) (18 - 18)  SpO2: 98% (10-31-20 @ 13:17) (98% - 98%)    MEDICATIONS  (STANDING):  apixaban 5 milliGRAM(s) Oral two times a day  carvedilol 25 milliGRAM(s) Oral every 12 hours  ferrous    sulfate 325 milliGRAM(s) Oral daily  finasteride 5 milliGRAM(s) Oral daily  pantoprazole    Tablet 40 milliGRAM(s) Oral before breakfast  tamsulosin 0.4 milliGRAM(s) Oral at bedtime    MEDICATIONS  (PRN):  ondansetron Injectable 4 milliGRAM(s) IV Push every 6 hours PRN Nausea and/or Vomiting    PHYSICAL EXAM:  GENERAL: NAD, well-developed  HEAD:  Atraumatic, Normocephalic  EYES: EOMI, conjunctiva and sclera clear  NECK: Supple, No JVD  CHEST/LUNG: Clear to auscultation bilaterally; No wheeze  HEART: Regular rate and rhythm; No murmurs, rubs, or gallops  ABDOMEN: Soft, Nontender, Nondistended; Bowel sounds present  EXTREMITIES:  2+ Peripheral Pulses, No clubbing, cyanosis, or edema  PSYCH: AAOx3  NEUROLOGY: non-focal  SKIN: No rashes or lesions                                                                         9.2    7.97  )-----------( 244      ( 31 Oct 2020 05:45 )             28.5             PTT - ( 31 Oct 2020 09:00 )  PTT:184.7 SEC  139|108|47<97  4.3|18|3.79  8.5,1.8,4.4  10-31 @ 05:45           CAPILLARY BLOOD GLUCOSE        RADIOLOGY & ADDITIONAL TESTS:    Imaging Personally Reviewed:    Consultant(s) Notes Reviewed:      Care Discussed with Consultants/Other Providers:

## 2020-10-31 NOTE — PROGRESS NOTE ADULT - ASSESSMENT
76yo M with pmhx cad, htn, bph, recent admission to [facility] for CAREN severe b/l hydro s/p gaston and was discharged home 2 days ago with gaston in place p/w nausea, vomiting cloudy urine and weakness  nephrology consulted for renal failure    Renal failure  recent CAREN sec to obstructive uropathy, scr peaked >9 and had improved and stable ~4.1 upon discharge  scr peaked 4.8 on this admission, renal function now improved and stabel ~3.7 possible baseline   Caren likely sec to prerenal vs obstruction   Gaston in place and is non oliguric  kidney US noted with right mild hydronephrosis improved, Left moderate hydronephrosis unchanged.  follow up  off IVF  monitor bmp, urine output  avoid nephrotoxic agents  renal dose medication     Patient is stable from renal for discharge. pt advise to follow up dr. Pérez in 1-2 weeks after discharge.     Proteinuria/hematuria  in setting of obstruction/ gaston  Monitor at present    Anemia  transfuse to keep hb>8  continue iron supplement  s/p GI work up  f/u GI  monitor    Hypomagnesemia   improved  supplement if needed    Hypocalcemia  better  elevated pth,  vit d 25 ok   monitor

## 2020-10-31 NOTE — PROGRESS NOTE ADULT - SUBJECTIVE AND OBJECTIVE BOX
MORGAN NICHOLSON  75y  Patient is a 75y old  Male who presents with a chief complaint of problem with gaston (26 Oct 2020 11:08)    HPI:  This is a patient who was admitted for VENUS in the setting of obstruction. Has CKD with Cr of 3-4.    HEALTH ISSUES - PROBLEM Dx:  Atrial fibrillation  PAF . Anticoagulation held during admission as source of anemia is unclear- -----    Anemia, unspecified type  Follow-up with your outpatient provider for further care/recommendations. Monitor for signs/symptoms indicating worsening of disease, such as, easy bleeding/bruising, pale skin, fatigue, dizziness, increased heart rate, or chest pain., unspecified type Recieved 2 units of blood.  Your stool was tested for blood and was negative.  You had an EGD and colonoscopy which did not show any active bleeding.  Your hemoglobin on discharge was 9.5.  Follow up with your primary care provider and GI Dr. Pena for continued monitoring and treatment.    Anemia  You were anemic during your admission &amp; required a blood transfusion, your hemoglobin    Benign prostatic hyperplasia with urinary frequency  Benign prostatic hyperplasia with urinary frequency Continue medications, follow up with urologist    Coronary artery disease involving native coronary artery of native heart without angina pectoris  Coronary artery disease involving native coronary artery of native heart without angina pectoris Continue aspirin , do not stop unless instructed by your physician.  Continue low salt, fat, cholesterol and carbohydrate diet. Follow up with cardiologist and primary care physician&#x27;s routine appointment.    Essential hypertension  Essential hypertension Low sodium and fat diet, Continue blood pressure medication regimen as directed. Monitor for any visual changes, headaches or dizziness.  Monitor blood pressure regularly.  Follow up with your primary care provider for further management for high blood pressure.    VENUS (acute kidney injury)  VENUS (acute kidney injury) Creatnine at discharge---  Avoid medications that may be harmful to your kidneys such as NSAID pain relievers (Advil, Aleve, Motrin, etc.) Use Tylenol for pain if needed. Avoid contrast if you require any medical testing.In order to prevent further disease progression, continue to follow recommendations made by your primary provider/nephrologist.  Follow-up as outpatient to monitor your kidney function, as well as, vitamin D, Calcium, potassium, and phosphorus levels.    Sepsis, due to unspecified organism, unspecified whether acute organ dysfunction present  Sepsis, due to unspecified organism, unspecified whether acute organ dysfunction present    Acute pyelonephritis  Acute pyelonephritis Follow up with urology within 1 week , moderate bilateral hydronephrosis.          MEDICATIONS  (STANDING):  apixaban 5 milliGRAM(s) Oral two times a day  carvedilol 25 milliGRAM(s) Oral every 12 hours  ferrous    sulfate 325 milliGRAM(s) Oral daily  finasteride 5 milliGRAM(s) Oral daily  heparin  Infusion.  Unit(s)/Hr (15 mL/Hr) IV Continuous <Continuous>  pantoprazole    Tablet 40 milliGRAM(s) Oral before breakfast  tamsulosin 0.4 milliGRAM(s) Oral at bedtime    MEDICATIONS  (PRN):  heparin   Injectable 6500 Unit(s) IV Push every 6 hours PRN For aPTT less than 40  heparin   Injectable 3000 Unit(s) IV Push every 6 hours PRN For aPTT between 40 - 57  ondansetron Injectable 4 milliGRAM(s) IV Push every 6 hours PRN Nausea and/or Vomiting    Vital Signs Last 24 Hrs  T(C): 36.7 (31 Oct 2020 05:27), Max: 36.7 (31 Oct 2020 05:27)  T(F): 98.1 (31 Oct 2020 05:27), Max: 98.1 (31 Oct 2020 05:27)  HR: 68 (31 Oct 2020 05:27) (68 - 153)  BP: 138/73 (31 Oct 2020 05:27) (106/69 - 144/103)  BP(mean): --  RR: 18 (31 Oct 2020 05:27) (18 - 18)  SpO2: 99% (31 Oct 2020 05:27) (98% - 99%)  Daily     Daily     PHYSICAL EXAM:  Constitutional:  He appears comfortable and not distressed. Not diaphoretic.    Respiratory: The lungs are clear to auscultation. No dullness and expansion is normal.    Cardiovascular: S1 and S2 are normal. No mummurs, rubs or gallops are present.    Gastrointestinal: The abdomen is soft. No tenderness is present. No masses are present. Bowel sounds are normal.    Genitourinary: The bladder is not distended. No CVA tenderness is present.    Extremities: No edema is noted. No deformities are present.    Neurological: Cognition is normal. Tone, power and sensation are normal.    Skin: No lesions are seen  or palpated.    Lymph Nodes: No lymphadenopathy is present.                          9.2    7.97  )-----------( 244      ( 31 Oct 2020 05:45 )             28.5     10-31    139  |  108<H>  |  47<H>  ----------------------------<  97  4.3   |  18<L>  |  3.79<H>    Ca    8.5      31 Oct 2020 05:45  Phos  4.4     10-31  Mg     1.8     10-31

## 2020-10-31 NOTE — PROVIDER CONTACT NOTE (CRITICAL VALUE NOTIFICATION) - BACKGROUND
Patient admitted for sepsis r/t UTI. Patient with PMH of HLD, CAD, HTN. Patient s/p 1 unit PRBC's, Hgb recheck 6.8
Patient admitted for VENUS, urinary retention, HTN, AFIB. H/O anemia
Patient admitted for sepsis r/t UTI. Patient with PMH of HLD, CAD, HTN. Patient s/p 1 unit PRBC's, Pt with Afib episode on 10/30, on heparin gtt at15ml/hr
Pt a/w sepsis
Pt admitted for sepsis related to UTI

## 2020-10-31 NOTE — PROVIDER CONTACT NOTE (CRITICAL VALUE NOTIFICATION) - RECOMMENDATIONS
repeat CBC continue to monitor.
ACP notified, will monitor patient/vitals.
Patient to be transfused 1 unit PRBC
Supplement magnesium. Continue to monitor. Notify provider.
follow up heparin nomogram

## 2020-10-31 NOTE — PROVIDER CONTACT NOTE (CRITICAL VALUE NOTIFICATION) - ASSESSMENT
Patient A&O4, denies chest pain shortness of breath and general discomfort at this time. no signs or symptoms of distress noted. no signs or symptoms of acute bleeding noted. Vital signs stable.
Patient A&O4, denies chest pain shortness of breath and general discomfort at this time. no signs or symptoms of distress noted. no signs or symptoms of acute bleeding noted. Vital signs stable.
Patient A+OX4. VS within normal limits. Denies chest pain, SOB or dizziness.
Pt A&Ox4, vs as documented, NSR on tele. Pt denies chest pain, SOB, numbness and tingling. Pt asymptomatic.
pt aaox4. Patient denies shortness of breath, chest pain, trouble breathing. Patient denies any signs/symptoms of distress. Pt resting comfortably in bed. Pt displaying no s/s of distress.

## 2020-10-31 NOTE — PROVIDER CONTACT NOTE (CRITICAL VALUE NOTIFICATION) - SITUATION
Patient with critical Hgb of 6.8.
Morning labs collected H/H returned as 6.8. CBC redrawn and returned as 6.5.
Patient with critical aPTT
Patient with critical value
Pt Magnesium 1.0

## 2020-10-31 NOTE — PROGRESS NOTE ADULT - SUBJECTIVE AND OBJECTIVE BOX
INTERVAL HPI/OVERNIGHT EVENTS:    denies n/v/d/c, abdominal pain, melena or brbpr     MEDICATIONS  (STANDING):  aspirin  chewable 81 milliGRAM(s) Oral daily  carvedilol 12.5 milliGRAM(s) Oral every 12 hours  ferrous    sulfate 325 milliGRAM(s) Oral daily  finasteride 5 milliGRAM(s) Oral daily  pantoprazole    Tablet 40 milliGRAM(s) Oral before breakfast  tamsulosin 0.4 milliGRAM(s) Oral at bedtime    MEDICATIONS  (PRN):  ondansetron Injectable 4 milliGRAM(s) IV Push every 6 hours PRN Nausea and/or Vomiting      Allergies    penicillin (Rash)    Intolerances        Review of Systems:    General:  No wt loss, fevers, chills, night sweats, fatigue   Eyes:  Good vision, no reported pain  ENT:  No sore throat, pain, runny nose, dysphagia  CV:  No pain, palpitations, hypo/hypertension  Resp:  No dyspnea, cough, tachypnea, wheezing  GI:  No pain, No nausea, No vomiting, No diarrhea, No constipation, No weight loss, No fever, No pruritis, No rectal bleeding, No melena, No dysphagia  :  No pain, bleeding, incontinence, nocturia  Muscle:  No pain, weakness  Neuro:  No weakness, tingling, memory problems  Psych:  No fatigue, insomnia, mood problems, depression  Endocrine:  No polyuria, polydypsia, cold/heat intolerance  Heme:  No petechiae, ecchymosis, easy bruisability  Skin:  No rash, tattoos, scars, edema      Vital Signs Last 24 Hrs  T(C): 36.9 (30 Oct 2020 05:27), Max: 36.9 (30 Oct 2020 05:27)  T(F): 98.5 (30 Oct 2020 05:27), Max: 98.5 (30 Oct 2020 05:27)  HR: 69 (30 Oct 2020 05:27) (60 - 72)  BP: 135/72 (30 Oct 2020 05:27) (107/70 - 135/72)  BP(mean): --  RR: 18 (30 Oct 2020 05:27) (18 - 18)  SpO2: 99% (30 Oct 2020 05:27) (96% - 99%)    PHYSICAL EXAM:    Constitutional: NAD  HEENT: EOMI, throat clear  Neck: No LAD, supple  Respiratory: CTA and P  Cardiovascular: S1 and S2, RRR, no M  Gastrointestinal: BS+, soft, NT/ND, neg HSM,  Extremities: No peripheral edema, neg clubbing, cyanosis  Vascular: 2+ peripheral pulses  Neurological: A/O x 3, no focal deficits  Psychiatric: Normal mood, normal affect  Skin: No rashes      LABS:                        9.5    7.17  )-----------( 218      ( 30 Oct 2020 05:59 )             30.7     10-30    140  |  108<H>  |  44<H>  ----------------------------<  94  4.4   |  20<L>  |  3.87<H>    Ca    8.8      30 Oct 2020 05:59  Phos  4.6     10-30  Mg     1.9     10-30            RADIOLOGY & ADDITIONAL TESTS:    Surgical Pathology Report (10.28.20 @ 17:51)    Surgical Pathology Report:   ACCESSION No:  [id number]    MORGAN NICHOLSON                          2        Surgical Final Report          Final Diagnosis  1. Colon, transverse, polyp, biopsy  - Tubular adenoma.    2. Colon, hepatic flexure, polyp, biopsy  - Tubular adenoma.    3. Duodenum, small bowel, biopsy  - Duodenal mucosa with no significant diagnostic  alterations.    4. Stomach, antrum/body, biopsy  - Gastric oxyntic mucosa with mild chronic inactive  gastritis.  - Negative for Helicobacter microorganisms (special stain).  - Negative for intestinal metaplasia.    5. GE junction, nodularity, biopsy  - Squamous and cardiac type mucosa with features consistent  with reflux esophagitis  and lymphoid aggregate.  - Negative for intestinal metaplasia or dysplasia.    Verified by: Colleen Villa M.D.  (Electronic Signature)  Reported on: 10/29/20 17:01 EDT, [address] [place] [address]  Phone: [phone]   Fax: [phone]  _________________________________________________________________    Clinical History  not provided    Specimen(s) Submitted  1- Transverse colon polyp bx  2- Hepatic flexure polyp  3- Duodenum, small bowel bx  4- Antrum/body bx  5- GE Junction nodularity bx    Gross Description  1. Received in formalin and labeled "transverse colon polyp  biopsy" is a 0.3 cm in greatest dimension tan-pink soft tissue  fragment. Entirely in one cassette.    2. Received in formalin and labeled "hepatic flexure polyp" are  three, tan-pink soft tissue fragments ranging from 0.4 cm to 0.6  cm in greatest dimension. Entirely in one cassette.            MORGAN NICHOLSON                          2        Surgical Final Report            3. Received in formalin and labeled "duodenal small bowel biopsy"  is a 0.3 cm in greatest dimension tan-pink soft tissue fragment.  Entirely in one cassette.    4. Received in formalin and labeled "antrum body" are four, tan-  pink soft tissue fragments ranging from 0.1 cm to 0.5 cm in  greatest dimension. H pylori staiing requested.  Entirely in one  cassette.    5. Received in formalin and labeled "GE junction biopsy" is a 0.3  cm in greatest dimension tan-pink soft tissue fragment. Entirely  in one cassette.    Mckenzie Delgado 10/28/2020 06:26 PM    Disclaimer  In addition to other data thatmay appear on the specimen  containers, all labels have been inspected to confirm the  presence of the patient's name and date of birth.    Histological Processing Performed at [facility], Department of Pathology, [address] [place] [address].      Surgical Consult Report          Disclaimer  In addition to other data that may appear on the specimen  containers, all labels have been inspected to confirm the  presence of the patient's name and date of birth.    Histological Processing Performed at [facility], Department of Pathology, [address] [place] [address].

## 2020-10-31 NOTE — PROVIDER CONTACT NOTE (CRITICAL VALUE NOTIFICATION) - NAME OF MD/NP/PA/DO NOTIFIED:
ACP, Katalina García
Connie Mane NP
SERENA Edwards
Veronika Larsen [facility]), [phone]
Katalina Loya

## 2020-10-31 NOTE — PROVIDER CONTACT NOTE (CRITICAL VALUE NOTIFICATION) - ACTION/TREATMENT ORDERED:
repeat CBC ordered.
ACP notified and aware, will continue to monitor and await further instruction.
Patient to be transfused 1 unit PRBC
Supplement magnesium and continue to monitor.
stopped for 60 min.

## 2020-10-31 NOTE — PROGRESS NOTE ADULT - PROBLEM SELECTOR PLAN 2
Gaston associated UTI / Acute pyelonephritis- Completed Ceftriaxone, urine cultures show E.coli, blood cultures show no growth to date.   Urology, Renal consults following. Bilateral moderate hydroureteronephrosis- last sono, post obstructive Uropathy. Continue with gaston, Flomax. Continue with Finasteride. PSA around 18, can be followed as out patient as per urology.

## 2020-11-01 LAB
ANION GAP SERPL CALC-SCNC: 11 MMO/L — SIGNIFICANT CHANGE UP (ref 7–14)
BUN SERPL-MCNC: 50 MG/DL — HIGH (ref 7–23)
CALCIUM SERPL-MCNC: 8.7 MG/DL — SIGNIFICANT CHANGE UP (ref 8.4–10.5)
CHLORIDE SERPL-SCNC: 105 MMOL/L — SIGNIFICANT CHANGE UP (ref 98–107)
CO2 SERPL-SCNC: 19 MMOL/L — LOW (ref 22–31)
CREAT SERPL-MCNC: 3.67 MG/DL — HIGH (ref 0.5–1.3)
GLUCOSE SERPL-MCNC: 93 MG/DL — SIGNIFICANT CHANGE UP (ref 70–99)
HCT VFR BLD CALC: 28.7 % — LOW (ref 39–50)
HGB BLD-MCNC: 9.3 G/DL — LOW (ref 13–17)
MAGNESIUM SERPL-MCNC: 2 MG/DL — SIGNIFICANT CHANGE UP (ref 1.6–2.6)
MCHC RBC-ENTMCNC: 28.3 PG — SIGNIFICANT CHANGE UP (ref 27–34)
MCHC RBC-ENTMCNC: 32.4 % — SIGNIFICANT CHANGE UP (ref 32–36)
MCV RBC AUTO: 87.2 FL — SIGNIFICANT CHANGE UP (ref 80–100)
NRBC # FLD: 0 K/UL — SIGNIFICANT CHANGE UP (ref 0–0)
PHOSPHATE SERPL-MCNC: 5.1 MG/DL — HIGH (ref 2.5–4.5)
PLATELET # BLD AUTO: 220 K/UL — SIGNIFICANT CHANGE UP (ref 150–400)
PMV BLD: 10.2 FL — SIGNIFICANT CHANGE UP (ref 7–13)
POTASSIUM SERPL-MCNC: 4.3 MMOL/L — SIGNIFICANT CHANGE UP (ref 3.5–5.3)
POTASSIUM SERPL-SCNC: 4.3 MMOL/L — SIGNIFICANT CHANGE UP (ref 3.5–5.3)
RBC # BLD: 3.29 M/UL — LOW (ref 4.2–5.8)
RBC # FLD: 13 % — SIGNIFICANT CHANGE UP (ref 10.3–14.5)
SODIUM SERPL-SCNC: 135 MMOL/L — SIGNIFICANT CHANGE UP (ref 135–145)
WBC # BLD: 7 K/UL — SIGNIFICANT CHANGE UP (ref 3.8–10.5)
WBC # FLD AUTO: 7 K/UL — SIGNIFICANT CHANGE UP (ref 3.8–10.5)

## 2020-11-01 RX ADMIN — Medication 325 MILLIGRAM(S): at 12:31

## 2020-11-01 RX ADMIN — APIXABAN 5 MILLIGRAM(S): 2.5 TABLET, FILM COATED ORAL at 05:27

## 2020-11-01 RX ADMIN — APIXABAN 5 MILLIGRAM(S): 2.5 TABLET, FILM COATED ORAL at 17:52

## 2020-11-01 RX ADMIN — TAMSULOSIN HYDROCHLORIDE 0.4 MILLIGRAM(S): 0.4 CAPSULE ORAL at 21:05

## 2020-11-01 RX ADMIN — CARVEDILOL PHOSPHATE 25 MILLIGRAM(S): 80 CAPSULE, EXTENDED RELEASE ORAL at 17:52

## 2020-11-01 RX ADMIN — PANTOPRAZOLE SODIUM 40 MILLIGRAM(S): 20 TABLET, DELAYED RELEASE ORAL at 05:27

## 2020-11-01 RX ADMIN — CARVEDILOL PHOSPHATE 25 MILLIGRAM(S): 80 CAPSULE, EXTENDED RELEASE ORAL at 05:27

## 2020-11-01 RX ADMIN — FINASTERIDE 5 MILLIGRAM(S): 5 TABLET, FILM COATED ORAL at 12:30

## 2020-11-01 NOTE — PROGRESS NOTE ADULT - PROBLEM/PLAN-6
DISPLAY PLAN FREE TEXT Localized Dermabrasion With Wire Brush Text: The patient was draped in routine manner.  Localized dermabrasion using 3 x 17 mm wire brush was performed in routine manner to papillary dermis. This spot dermabrasion is being performed to complete skin cancer reconstruction. It also will eliminate the other sun damaged precancerous cells that are known to be part of the regional effect of a lifetime's worth of sun exposure. This localized dermabrasion is therapeutic and should not be considered cosmetic in any regard. Localized Dermabrasion Text: The patient was draped in routine manner.  Localized dermabrasion using 3 x 17 mm wire brush was performed in routine manner to papillary dermis. This spot dermabrasion is being performed to complete skin cancer reconstruction. It also will eliminate the other sun damaged precancerous cells that are known to be part of the regional effect of a lifetime's worth of sun exposure. This localized dermabrasion is therapeutic and should not be considered cosmetic in any regard.

## 2020-11-01 NOTE — PROGRESS NOTE ADULT - SUBJECTIVE AND OBJECTIVE BOX
INTERVAL HPI/OVERNIGHT EVENTS:    denies n/v/d/c, abdominal pain, melena or brbpr     MEDICATIONS  (STANDING):  aspirin  chewable 81 milliGRAM(s) Oral daily  carvedilol 12.5 milliGRAM(s) Oral every 12 hours  ferrous    sulfate 325 milliGRAM(s) Oral daily  finasteride 5 milliGRAM(s) Oral daily  pantoprazole    Tablet 40 milliGRAM(s) Oral before breakfast  tamsulosin 0.4 milliGRAM(s) Oral at bedtime    MEDICATIONS  (PRN):  ondansetron Injectable 4 milliGRAM(s) IV Push every 6 hours PRN Nausea and/or Vomiting      Allergies    penicillin (Rash)    Intolerances        Review of Systems:    General:  No wt loss, fevers, chills, night sweats, fatigue   Eyes:  Good vision, no reported pain  ENT:  No sore throat, pain, runny nose, dysphagia  CV:  No pain, palpitations, hypo/hypertension  Resp:  No dyspnea, cough, tachypnea, wheezing  GI:  No pain, No nausea, No vomiting, No diarrhea, No constipation, No weight loss, No fever, No pruritis, No rectal bleeding, No melena, No dysphagia  :  No pain, bleeding, incontinence, nocturia  Muscle:  No pain, weakness  Neuro:  No weakness, tingling, memory problems  Psych:  No fatigue, insomnia, mood problems, depression  Endocrine:  No polyuria, polydypsia, cold/heat intolerance  Heme:  No petechiae, ecchymosis, easy bruisability  Skin:  No rash, tattoos, scars, edema      Vital Signs Last 24 Hrs  T(C): 36.9 (30 Oct 2020 05:27), Max: 36.9 (30 Oct 2020 05:27)  T(F): 98.5 (30 Oct 2020 05:27), Max: 98.5 (30 Oct 2020 05:27)  HR: 69 (30 Oct 2020 05:27) (60 - 72)  BP: 135/72 (30 Oct 2020 05:27) (107/70 - 135/72)  BP(mean): --  RR: 18 (30 Oct 2020 05:27) (18 - 18)  SpO2: 99% (30 Oct 2020 05:27) (96% - 99%)    PHYSICAL EXAM:    Constitutional: NAD  HEENT: EOMI, throat clear  Neck: No LAD, supple  Respiratory: CTA and P  Cardiovascular: S1 and S2, RRR, no M  Gastrointestinal: BS+, soft, NT/ND, neg HSM,  Extremities: No peripheral edema, neg clubbing, cyanosis  Vascular: 2+ peripheral pulses  Neurological: A/O x 3, no focal deficits  Psychiatric: Normal mood, normal affect  Skin: No rashes      LABS:                        9.5    7.17  )-----------( 218      ( 30 Oct 2020 05:59 )             30.7     10-30    140  |  108<H>  |  44<H>  ----------------------------<  94  4.4   |  20<L>  |  3.87<H>    Ca    8.8      30 Oct 2020 05:59  Phos  4.6     10-30  Mg     1.9     10-30            RADIOLOGY & ADDITIONAL TESTS:    Surgical Pathology Report (10.28.20 @ 17:51)    Surgical Pathology Report:   ACCESSION No:  [id number]    MORGAN NICHOLSON                          2        Surgical Final Report          Final Diagnosis  1. Colon, transverse, polyp, biopsy  - Tubular adenoma.    2. Colon, hepatic flexure, polyp, biopsy  - Tubular adenoma.    3. Duodenum, small bowel, biopsy  - Duodenal mucosa with no significant diagnostic  alterations.    4. Stomach, antrum/body, biopsy  - Gastric oxyntic mucosa with mild chronic inactive  gastritis.  - Negative for Helicobacter microorganisms (special stain).  - Negative for intestinal metaplasia.    5. GE junction, nodularity, biopsy  - Squamous and cardiac type mucosa with features consistent  with reflux esophagitis  and lymphoid aggregate.  - Negative for intestinal metaplasia or dysplasia.    Verified by: Colleen Villa M.D.  (Electronic Signature)  Reported on: 10/29/20 17:01 EDT, [address] [place] [address]  Phone: [phone]   Fax: [phone]  _________________________________________________________________    Clinical History  not provided    Specimen(s) Submitted  1- Transverse colon polyp bx  2- Hepatic flexure polyp  3- Duodenum, small bowel bx  4- Antrum/body bx  5- GE Junction nodularity bx    Gross Description  1. Received in formalin and labeled "transverse colon polyp  biopsy" is a 0.3 cm in greatest dimension tan-pink soft tissue  fragment. Entirely in one cassette.    2. Received in formalin and labeled "hepatic flexure polyp" are  three, tan-pink soft tissue fragments ranging from 0.4 cm to 0.6  cm in greatest dimension. Entirely in one cassette.            MORGAN NICHOLSON                          2        Surgical Final Report            3. Received in formalin and labeled "duodenal small bowel biopsy"  is a 0.3 cm in greatest dimension tan-pink soft tissue fragment.  Entirely in one cassette.    4. Received in formalin and labeled "antrum body" are four, tan-  pink soft tissue fragments ranging from 0.1 cm to 0.5 cm in  greatest dimension. H pylori staiing requested.  Entirely in one  cassette.    5. Received in formalin and labeled "GE junction biopsy" is a 0.3  cm in greatest dimension tan-pink soft tissue fragment. Entirely  in one cassette.    Mckenzie Delgado 10/28/2020 06:26 PM    Disclaimer  In addition to other data thatmay appear on the specimen  containers, all labels have been inspected to confirm the  presence of the patient's name and date of birth.    Histological Processing Performed at [facility], Department of Pathology, [address] [place] [address].      Surgical Consult Report          Disclaimer  In addition to other data that may appear on the specimen  containers, all labels have been inspected to confirm the  presence of the patient's name and date of birth.    Histological Processing Performed at [facility], Department of Pathology, [address] [place] [address].

## 2020-11-01 NOTE — PROGRESS NOTE ADULT - SUBJECTIVE AND OBJECTIVE BOX
MORGAN NICHOLSON  75y  Patient is a 75y old  Male who presents with a chief complaint of problem with gaston (26 Oct 2020 11:08)    HPI:  This is a patient with Obstructive Nephropathy.  No new complaints.        HEALTH ISSUES - PROBLEM Dx:  Atrial fibrillation with RVR  Atrial fibrillation with RVR    Atrial fibrillation  PAF . Anticoagulation held during admission as source of anemia is unclear- -----    Anemia, unspecified type  Follow-up with your outpatient provider for further care/recommendations. Monitor for signs/symptoms indicating worsening of disease, such as, easy bleeding/bruising, pale skin, fatigue, dizziness, increased heart rate, or chest pain., unspecified type Recieved 2 units of blood.  Your stool was tested for blood and was negative.  You had an EGD and colonoscopy which did not show any active bleeding.  Your hemoglobin on discharge was 9.5.  Follow up with your primary care provider and GI Dr. Pena for continued monitoring and treatment.    Anemia  You were anemic during your admission &amp; required a blood transfusion, your hemoglobin    Benign prostatic hyperplasia with urinary frequency  Benign prostatic hyperplasia with urinary frequency Continue medications, follow up with urologist    Coronary artery disease involving native coronary artery of native heart without angina pectoris  Coronary artery disease involving native coronary artery of native heart without angina pectoris Continue aspirin , do not stop unless instructed by your physician.  Continue low salt, fat, cholesterol and carbohydrate diet. Follow up with cardiologist and primary care physician&#x27;s routine appointment.    Essential hypertension  Essential hypertension Low sodium and fat diet, Continue blood pressure medication regimen as directed. Monitor for any visual changes, headaches or dizziness.  Monitor blood pressure regularly.  Follow up with your primary care provider for further management for high blood pressure.    VENUS (acute kidney injury)  VENUS (acute kidney injury) Creatnine at discharge---  Avoid medications that may be harmful to your kidneys such as NSAID pain relievers (Advil, Aleve, Motrin, etc.) Use Tylenol for pain if needed. Avoid contrast if you require any medical testing.In order to prevent further disease progression, continue to follow recommendations made by your primary provider/nephrologist.  Follow-up as outpatient to monitor your kidney function, as well as, vitamin D, Calcium, potassium, and phosphorus levels.    Sepsis, due to unspecified organism, unspecified whether acute organ dysfunction present  Sepsis, due to unspecified organism, unspecified whether acute organ dysfunction present    Acute pyelonephritis  Acute pyelonephritis Follow up with urology within 1 week , moderate bilateral hydronephrosis.          MEDICATIONS  (STANDING):  apixaban 5 milliGRAM(s) Oral two times a day  carvedilol 25 milliGRAM(s) Oral every 12 hours  ferrous    sulfate 325 milliGRAM(s) Oral daily  finasteride 5 milliGRAM(s) Oral daily  pantoprazole    Tablet 40 milliGRAM(s) Oral before breakfast  tamsulosin 0.4 milliGRAM(s) Oral at bedtime    MEDICATIONS  (PRN):  ondansetron Injectable 4 milliGRAM(s) IV Push every 6 hours PRN Nausea and/or Vomiting    Vital Signs Last 24 Hrs  T(C): 36.7 (01 Nov 2020 12:45), Max: 37.1 (31 Oct 2020 21:49)  T(F): 98 (01 Nov 2020 12:45), Max: 98.8 (31 Oct 2020 21:49)  HR: 59 (01 Nov 2020 12:45) (59 - 65)  BP: 112/68 (01 Nov 2020 12:45) (112/68 - 133/78)  BP(mean): --  RR: 18 (01 Nov 2020 12:45) (18 - 18)  SpO2: 96% (01 Nov 2020 12:45) (95% - 97%)  Daily     Daily     PHYSICAL EXAM:  Constitutional:  he appears comfortable and not distressed. Not diaphoretic.    Respiratory: The lungs are clear to auscultation. No dullness and expansion is normal.    Cardiovascular: S1 and S2 are normal. No mummurs, rubs or gallops are present.    Gastrointestinal: The abdomen is soft. No tenderness is present. No masses are present. Bowel sounds are normal.    Genitourinary: The bladder is not distended. No CVA tenderness is present.    Extremities: No edema is noted. No deformities are present.    Neurological: Cognition is normal. Tone, power and sensation are normal. Gait is steady.                            9.3    7.00  )-----------( 220      ( 01 Nov 2020 06:03 )             28.7     11-01    135  |  105  |  50<H>  ----------------------------<  93  4.3   |  19<L>  |  3.67<H>    Ca    8.7      01 Nov 2020 06:03  Phos  5.1     11-01  Mg     2.0     11-01

## 2020-11-01 NOTE — PROGRESS NOTE ADULT - ASSESSMENT
74yo M with pmhx cad, htn, bph, recent admission to [facility] for CAREN severe b/l hydro s/p gaston and was discharged home 2 days ago with gaston in place p/w nausea, vomiting cloudy urine and weakness  nephrology consulted for renal failure    Renal failure  This is a patient with recent CAREN due to obstructive uropathy, scr peaked >9 and had improved and stable ~4.1 upon discharge  scr peaked 4.8 on this admission, renal function now improved and stabel ~3.7 possible baseline   Caren likely sec to prerenal vs obstruction   Gasotn in place and is non oliguric  kidney US noted with right mild hydronephrosis improved, Left moderate hydronephrosis unchanged.  follow up  off IVF  monitor bmp, urine output  avoid nephrotoxic agents  renal dose medication     Patient is stable from renal for discharge. pt advise to follow up dr. Pérez in 1-2 weeks after discharge.     Proteinuria/hematuria  in setting of obstruction/ gaston  Monitor at present    Anemia  transfuse to keep hb>8  continue iron supplement  s/p GI work up  f/u GI  monitor    Hypomagnesemia   improved  supplement if needed    Hypocalcemia  better  elevated pth,  vit d 25 ok   monitor

## 2020-11-01 NOTE — PROGRESS NOTE ADULT - SUBJECTIVE AND OBJECTIVE BOX
Patient is a 75y old  Male who presents with a chief complaint of weakness (23 Oct 2020 15:31)      SUBJECTIVE / OVERNIGHT EVENTS: No events overnight.   Patient feels ok.     T(C): 36.7 (11-01-20 @ 12:45), Max: 36.7 (11-01-20 @ 12:45)  HR: 69 (11-01-20 @ 17:44) (59 - 69)  BP: 125/83 (11-01-20 @ 17:44) (112/68 - 125/83)  RR: 18 (11-01-20 @ 12:45) (18 - 18)  SpO2: 96% (11-01-20 @ 12:45) (96% - 96%)    MEDICATIONS  (STANDING):  apixaban 5 milliGRAM(s) Oral two times a day  carvedilol 25 milliGRAM(s) Oral every 12 hours  ferrous    sulfate 325 milliGRAM(s) Oral daily  finasteride 5 milliGRAM(s) Oral daily  pantoprazole    Tablet 40 milliGRAM(s) Oral before breakfast  tamsulosin 0.4 milliGRAM(s) Oral at bedtime    MEDICATIONS  (PRN):  ondansetron Injectable 4 milliGRAM(s) IV Push every 6 hours PRN Nausea and/or Vomiting      PHYSICAL EXAM:  GENERAL: NAD, well-developed  HEAD:  Atraumatic, Normocephalic  EYES: EOMI, conjunctiva and sclera clear  NECK: Supple, No JVD  CHEST/LUNG: Clear to auscultation bilaterally; No wheeze  HEART: Regular rate and rhythm; No murmurs, rubs, or gallops  ABDOMEN: Soft, Nontender, Nondistended; Bowel sounds present  EXTREMITIES:  2+ Peripheral Pulses, No clubbing, cyanosis, or edema  PSYCH: AAOx3  NEUROLOGY: non-focal  SKIN: No rashes or lesions                                                       9.3    7.00  )-----------( 220      ( 01 Nov 2020 06:03 )             28.7             PTT - ( 31 Oct 2020 09:00 )  PTT:184.7 SEC  135|105|50<93  4.3|19|3.67  8.7,2.0,5.1  11-01 @ 06:03  CAPILLARY BLOOD GLUCOSE        RADIOLOGY & ADDITIONAL TESTS:    Imaging Personally Reviewed:    Consultant(s) Notes Reviewed:      Care Discussed with Consultants/Other Providers:

## 2020-11-01 NOTE — PROGRESS NOTE ADULT - SUBJECTIVE AND OBJECTIVE BOX
Cardiovascular Disease Progress Note    Overnight events: No acute events overnight. Patient denies chest pain or SOB.    Otherwise review of systems negative    Objective Findings:  T(C): 36.8 (11-01-20 @ 05:25), Max: 37.1 (10-31-20 @ 21:49)  HR: 61 (11-01-20 @ 05:25) (61 - 65)  BP: 133/78 (11-01-20 @ 05:25) (121/76 - 133/78)  RR: 18 (11-01-20 @ 05:25) (18 - 18)  SpO2: 97% (11-01-20 @ 05:25) (95% - 98%)  Wt(kg): --  Daily     Daily       Physical Exam:  Gen: NAD; Patient resting comfortably  HEENT: EOMI, Normocephalic/ atraumatic  CV: RRR, normal S1 + S2, no m/r/g  Lungs:  Normal respiratory effort; clear to auscultation bilaterally  Abd: soft, non-tender; bowel sounds present  Ext: No edema; warm and well perfused    Telemetry: Sinus    Laboratory Data:                        9.3    7.00  )-----------( 220      ( 01 Nov 2020 06:03 )             28.7     11-01    135  |  105  |  50<H>  ----------------------------<  93  4.3   |  19<L>  |  3.67<H>    Ca    8.7      01 Nov 2020 06:03  Phos  5.1     11-01  Mg     2.0     11-01      PTT - ( 31 Oct 2020 09:00 )  PTT:184.7 SEC          Inpatient Medications:  MEDICATIONS  (STANDING):  apixaban 5 milliGRAM(s) Oral two times a day  carvedilol 25 milliGRAM(s) Oral every 12 hours  ferrous    sulfate 325 milliGRAM(s) Oral daily  finasteride 5 milliGRAM(s) Oral daily  pantoprazole    Tablet 40 milliGRAM(s) Oral before breakfast  tamsulosin 0.4 milliGRAM(s) Oral at bedtime      Assessment:  75 year old man with CAD s/p PCI several years ago, HTN, and BPH presents with UTI and a-fib with RVR    Plan of Care:    #A-fib with RVR-  Patient has maintained sinus for the past 24 hours.  Continue Coreg as ordered.   No obvious source of bleeding noted on EGD/colonoscopy.  Hb stable this morning.  Eliquis 5 mg PO BID.   Echo from earlier this month reviewed- preserved LVEF with no hemodynamically significant valvular disease.    #HTN-  BP acceptable on current regimen.     #CAD s/p PCI-  PCI several years ago.   I will stop ASA now that patient is on full dose AC for a-fib and has a history of anemia.     #ACP (advance care planning)-  Advanced care planning was discussed with the patient.  All questions were answered.      Over 25 minutes spent on total encounter; more than 50% of the visit was spent counseling and/or coordinating care by the attending physician.      Lionel Miller MD [facility]  Cardiovascular Disease  [phone]

## 2020-11-01 NOTE — PROGRESS NOTE ADULT - PROBLEM SELECTOR PLAN 1
DATE OF ADMISSION:  2020

 

CHIEF COMPLAINT:   Abdominal pain and abnormal laboratories.

 

HISTORY OF PRESENT ILLNESS:   This is a 47-year-old female who began having

abdominal pain, constipation and fevers approximately a week and half ago on

2020.  She states that the night before she was having a few

drinks with a friend and they spent the evening in the hot tub relaxing.  She

woke up on Saturday, 2020, constipated with fevers and chills.  She was

having abdominal pain and she was unable to have a bowel movement.  She waited

for about a week before presenting to Urgent Care on 2020 where she was

diagnosed with a urinary tract infection.  She was put on cephalexin for 2 days

and was called back by the urgent care stating that her urine culture had been

negative.  She went back to the urgent care this morning on 2020 and was

subsequently sent to the emergency department for high labs.

 

She is a G3, P2-0-1-2, 47-year-old female.  She has an intrauterine device (IUD)

(ParaGard) that she has had for about 7 to 10 years.  She says that her last

intercourse was about 1 month ago.  She has had no new partners and has been with

the same partner for years.  She denies any dyspareunia, dysuria or history of

sexually transmitted infections.  Her last Pap was about 15 years ago, which was

positive for HPV and dysplasia.  This was treated with a LEEP and she has had no

abnormal Pap smear since.  She is due for a Pap in the next month or so.  She

does have a history of using oral contraceptive pills when she was in her teenage

years.  She does not remember whether these were estrogen only or combined.  She

was also previously on a NuvaRing.

 

Upon arrival to the emergency department, she was found to have an elevated white

count at 28.9 with a left shift.  Pelvic exam revealed a bright green vaginal

discharge with mild cervical motion tenderness.  Swabs taken were negative for

gonorrhea, chlamydia and trichomoniasis.  CT of the abdomen revealed a

heterogeneous enlarged uterus with her uterine fat stranding and periaortic

lymphadenopathy as well as right hydronephrosis and hydroureter.  The urinalysis

also showed urine WBCs too numerous to count, 3+ leukocyte esterase. 76 urine

RBCs, 2+ bacteria and nitrite positive.  Subsequently, the hospitalist service

was called for admission.

 

PAST MEDICAL HISTORY:  History of bronchitis several months ago.  History of

tobacco abuse, quit in October.

 

PAST SURGICAL HISTORY:  None.

 

ALLERGIES:  None.

 

FAMILY HISTORY:  No family history of uterine, ovarian, endometrial or breast

cancer.  No family history of liver syndrome.  Her father  6 months ago, she

does not remember the cause.  Her brother is dying from amyotrophic lateral

sclerosis (ALS).

 

HOME MEDICATIONS:

- Tylenol 500 mg every 6 hours as needed for pain

- albuterol sulfate inhaler 2 puffs inhaled four times a day as needed for

shortness of breath

- Naproxen 440 mg by mouth twice a day as needed for pain

 

REVIEW OF SYSTEMS:

CONSTITUTIONAL:  Positive for fevers and chills with a maximum temperature

(t-max) of 103 about 3 days ago.  Denies drenching night sweats or changes to her

weight.

EYES:  Denies any visual changes, headaches, eye pain, double vision.

EARS, NOSE AND THROAT:  Denies runny nose, epistaxis, sinus pain, stuffy ears,

tinnitus, gingival bleeding, sore throat or odynophagia.

CARDIOVASCULAR:  Denies any new chest pain, shortness of breath, paroxysmal

nocturnal dyspnea, orthopnea or edema.

RESPIRATORY:  Denies any cough, sputum, wheezes or hemoptysis.

GASTROINTESTINAL:.  Positive for abdominal pain and constipation and denies any

nausea, vomiting, diarrhea, obstipation, hematemesis, hematochezia, melena or

tenesmus.  She does endorse feeling like her abdomen has gotten slightly larger

over the last couple of weeks.

GENITOURINARY:  Denies any incontinence, dysuria, hematuria, nocturia, polyuria,

hesitancy or dribbling.

MUSCULOSKELETAL:  Denies any new muscle pain, stiffness, joint swelling or

decreased range of motion.

INTEGUMENTARY:  Denies any pruritus, rashes, striae, or lesions.

NEUROLOGIC:  Denies any changes to sight / smell / hearing / taste, seizures,

headaches.

PSYCHIATRIC:  Positive for some anxiety.  Denies any new depression, paranoia,

anhedonia or episodes of mishel.

ENDOCRINE:  Denies any tremor, palpitations or visual disturbances, denies dry

skin.  Positive for irregular periods over the last year, as well as hot

flashes.

HEMATOLOGIC:  Denies any anemia, purpura or petechiae.  Denies easy bruising or

bleeding.

LYMPHATIC:  Denies any new lumps or bumps anywhere.

 

PHYSICAL EXAMINATION:

Vitals:  Temperature 97.8, pulse is 102 and regular, respiratory rate 16 and

unlabored, blood pressure 173/92, pulse ox is 95% on room air.

GENERAL:  This is a middle-aged obese female in no acute distress.

HEENT:  Atraumatic, normocephalic.  Anicteric sclera.  No conjunctival injection,

mucous membranes are moist.  No deformities are noted.

NECK:  No jugular venous distention (JVD), supple, no masses.  No thyromegaly.

LUNGS:  Clear to auscultation bilaterally.  No wheezes, rhonchi or rales.

HEART:  Regular rate and rhythm.  Mildly tachycardiac.  No murmurs, gallops or

rubs.

ABDOMEN:  Obese, somewhat protuberant.   No tympany to percussion.  Mild

tenderness to palpation in the lower quadrants.  The uterine fundus is palpated 2

cm below the umbilicus.  No inguinal adenopathy is noted.

EXTREMITIES:  Muscle strength 5/5 throughout.  No lower extremity edema

bilaterally.  Capillary refill is brisk and pulses are 2+ bilaterally for both

posterior tibialis and dorsalis pedis

NEUROLOGIC:  Cranial nerves II-XII grossly intact.  Deep tendon reflexes 2+

bilaterally.  Sensation is intact.  Muscle strength is 5/5 bilaterally.  No

neurological deficits noted.

PSYCHIATRIC:  Mood is mildly anxious, but affect is appropriate.

SKIN:  No rashes or lesions were noted.

 

LABORATORY DATA:

CBC:  WBC 28.9, hemoglobin 11.1, hematocrit 35.2, platelet count is 447.

Differential shows 83% neutrophils, 8% lymphocytes 7% monocytes.

 

Chemistry:  Sodium 134, potassium 3.4, chloride 101, carbon dioxide 29, anion gap

9, BUN 9, creatinine 0.75, glucose 131, calcium 8.7, total bilirubin 0.8, direct

bilirubin 0.4, AST 35, ALT 63, alkaline phosphatase 166, total protein 6.9,

albumin 2.6, lipase 58.

 

Urinalysis:  Cloudy and stone in appearance with a pH of 5.0, specific gravity of

1.010. Urine protein 1+, trace ketones, 1+ blood and positive nitrites.

Urobilinogen 2.0, 3+ leukocyte esterase, urine WBCs too numerous to count, urine

RBCs 76, urine bacteria 2+.

 

Serology:  Gonorrhea, chlamydia and trichomoniasis negative.

 

Microbiology:  Wet prep showed moderate epithelial cells, moderate WBCs, many

short rods and a few clue cells.  Cervical culture Gram stain and genital culture

pending.  Urine culture pending.

 

IMAGING STUDIES:

CT of the abdomen and pelvis showed an enlarged and heterogeneous uterus

measuring 7.6 x 11.4 x 11.4 cm with periuterine fat stranding with an IUD in the

lower uterine segment.  Multiple periaortic lymph nodes with the largest

measuring 1.2 cm in the left para-aortic region.  Right hydronephrosis and

hydroureter with mild caliceal prominence and ureteral prominence on the left.

Scattered diverticulosis of the sigmoid colon.  Hepatomegaly with hepatic

steatosis.

 

Pelvic ultrasound shows a heterogeneous enlarged uterus measuring 14 x 7 x 6 cm

without any discrete measurable uterine or endometrial mass.  Enlarged to right

and left ovaries, the right ovary has multiple complex cystic areas measuring up

to 5.5 cm and the left ovary has multiple complex cystic areas as well.  IUD in

the lower uterine segment.

 

ASSESSMENT:

This is a 47-year-old G3, P 2-0-1-2 presenting with constipation, fevers and

chills with a maximum temperature (t-max) of 103 about 3 days ago found to have

uterine enlargement on imaging and an elevated white count of 20.9.

 

PLAN:

1.  Sepsis secondary to uterine pathology.  Elevated white count, fevers and

tachycardia.  We will cover for pelvic inflammatory disease with cefotetan 2

grams IV q. 12 and doxycycline 100 mg p.o. q. 12 hours.  Dr. Bowen from OB/GYN was

consulted, we appreciate his help.  Gonorrhea, chlamydia and trichomonas were

negative.  Cervical culture is pending.  We will also obtain blood cultures,

though the patient was already treated with Rocephin in the emergency room.  Wet

prep showed moderate epithelial cells, moderate WBCs, many short rods and a few

clue cells.  CA-125 has been ordered to assess for malignancy.

 

2.  Deep vein thrombosis (DVT) prophylaxis with Lovenox.

 

DISPOSITION:

Admit to medical/surgical inpatient as we expect more than two midnights. s/p A.fib- continue with Coreg. Cards following.   Currently in sinus.   Transition to Eliquis BID.  D/C planning. none None

## 2020-11-02 DIAGNOSIS — I95.1 ORTHOSTATIC HYPOTENSION: ICD-10-CM

## 2020-11-02 LAB
ANION GAP SERPL CALC-SCNC: 15 MMO/L — HIGH (ref 7–14)
BUN SERPL-MCNC: 59 MG/DL — HIGH (ref 7–23)
CALCIUM SERPL-MCNC: 8.9 MG/DL — SIGNIFICANT CHANGE UP (ref 8.4–10.5)
CHLORIDE SERPL-SCNC: 107 MMOL/L — SIGNIFICANT CHANGE UP (ref 98–107)
CO2 SERPL-SCNC: 17 MMOL/L — LOW (ref 22–31)
CREAT SERPL-MCNC: 3.76 MG/DL — HIGH (ref 0.5–1.3)
GLUCOSE SERPL-MCNC: 90 MG/DL — SIGNIFICANT CHANGE UP (ref 70–99)
HCT VFR BLD CALC: 29.2 % — LOW (ref 39–50)
HGB BLD-MCNC: 9.4 G/DL — LOW (ref 13–17)
MAGNESIUM SERPL-MCNC: 1.9 MG/DL — SIGNIFICANT CHANGE UP (ref 1.6–2.6)
MCHC RBC-ENTMCNC: 28.3 PG — SIGNIFICANT CHANGE UP (ref 27–34)
MCHC RBC-ENTMCNC: 32.2 % — SIGNIFICANT CHANGE UP (ref 32–36)
MCV RBC AUTO: 88 FL — SIGNIFICANT CHANGE UP (ref 80–100)
NRBC # FLD: 0 K/UL — SIGNIFICANT CHANGE UP (ref 0–0)
PHOSPHATE SERPL-MCNC: 5.1 MG/DL — HIGH (ref 2.5–4.5)
PLATELET # BLD AUTO: 237 K/UL — SIGNIFICANT CHANGE UP (ref 150–400)
PMV BLD: 10.4 FL — SIGNIFICANT CHANGE UP (ref 7–13)
POTASSIUM SERPL-MCNC: 4.8 MMOL/L — SIGNIFICANT CHANGE UP (ref 3.5–5.3)
POTASSIUM SERPL-SCNC: 4.8 MMOL/L — SIGNIFICANT CHANGE UP (ref 3.5–5.3)
RBC # BLD: 3.32 M/UL — LOW (ref 4.2–5.8)
RBC # FLD: 13.2 % — SIGNIFICANT CHANGE UP (ref 10.3–14.5)
SODIUM SERPL-SCNC: 139 MMOL/L — SIGNIFICANT CHANGE UP (ref 135–145)
WBC # BLD: 8.02 K/UL — SIGNIFICANT CHANGE UP (ref 3.8–10.5)
WBC # FLD AUTO: 8.02 K/UL — SIGNIFICANT CHANGE UP (ref 3.8–10.5)

## 2020-11-02 PROCEDURE — 93010 ELECTROCARDIOGRAM REPORT: CPT

## 2020-11-02 RX ORDER — CARVEDILOL PHOSPHATE 80 MG/1
3.12 CAPSULE, EXTENDED RELEASE ORAL EVERY 12 HOURS
Refills: 0 | Status: DISCONTINUED | OUTPATIENT
Start: 2020-11-02 | End: 2020-11-03

## 2020-11-02 RX ORDER — SODIUM CHLORIDE 9 MG/ML
1000 INJECTION INTRAMUSCULAR; INTRAVENOUS; SUBCUTANEOUS
Refills: 0 | Status: DISCONTINUED | OUTPATIENT
Start: 2020-11-02 | End: 2020-11-05

## 2020-11-02 RX ORDER — SODIUM CHLORIDE 9 MG/ML
500 INJECTION INTRAMUSCULAR; INTRAVENOUS; SUBCUTANEOUS ONCE
Refills: 0 | Status: COMPLETED | OUTPATIENT
Start: 2020-11-02 | End: 2020-11-02

## 2020-11-02 RX ADMIN — Medication 325 MILLIGRAM(S): at 18:15

## 2020-11-02 RX ADMIN — SODIUM CHLORIDE 1000 MILLILITER(S): 9 INJECTION INTRAMUSCULAR; INTRAVENOUS; SUBCUTANEOUS at 18:15

## 2020-11-02 RX ADMIN — APIXABAN 5 MILLIGRAM(S): 2.5 TABLET, FILM COATED ORAL at 05:24

## 2020-11-02 RX ADMIN — SODIUM CHLORIDE 75 MILLILITER(S): 9 INJECTION INTRAMUSCULAR; INTRAVENOUS; SUBCUTANEOUS at 18:41

## 2020-11-02 RX ADMIN — CARVEDILOL PHOSPHATE 25 MILLIGRAM(S): 80 CAPSULE, EXTENDED RELEASE ORAL at 05:24

## 2020-11-02 RX ADMIN — PANTOPRAZOLE SODIUM 40 MILLIGRAM(S): 20 TABLET, DELAYED RELEASE ORAL at 05:25

## 2020-11-02 RX ADMIN — TAMSULOSIN HYDROCHLORIDE 0.4 MILLIGRAM(S): 0.4 CAPSULE ORAL at 21:02

## 2020-11-02 RX ADMIN — FINASTERIDE 5 MILLIGRAM(S): 5 TABLET, FILM COATED ORAL at 18:15

## 2020-11-02 RX ADMIN — APIXABAN 5 MILLIGRAM(S): 2.5 TABLET, FILM COATED ORAL at 18:15

## 2020-11-02 RX ADMIN — SODIUM CHLORIDE 75 MILLILITER(S): 9 INJECTION INTRAMUSCULAR; INTRAVENOUS; SUBCUTANEOUS at 21:02

## 2020-11-02 RX ADMIN — CARVEDILOL PHOSPHATE 3.12 MILLIGRAM(S): 80 CAPSULE, EXTENDED RELEASE ORAL at 18:15

## 2020-11-02 NOTE — PROGRESS NOTE ADULT - SUBJECTIVE AND OBJECTIVE BOX
Patient is a 75y old  Male who presents with a chief complaint of weakness (23 Oct 2020 15:31)      SUBJECTIVE / OVERNIGHT EVENTS: No events overnight.   Patient feels ok.     T(C): 36.4 (11-02-20 @ 12:05), Max: 36.4 (11-02-20 @ 12:05)  HR: 62 (11-02-20 @ 18:14) (58 - 62)  BP: 112/77 (11-02-20 @ 18:14) (109/62 - 112/77)  RR: 18 (11-02-20 @ 12:05) (18 - 20)  SpO2: 98% (11-02-20 @ 12:05) (98% - 100%)    MEDICATIONS  (STANDING):  apixaban 5 milliGRAM(s) Oral two times a day  carvedilol 3.125 milliGRAM(s) Oral every 12 hours  ferrous    sulfate 325 milliGRAM(s) Oral daily  finasteride 5 milliGRAM(s) Oral daily  pantoprazole    Tablet 40 milliGRAM(s) Oral before breakfast  sodium chloride 0.9%. 1000 milliLiter(s) (75 mL/Hr) IV Continuous <Continuous>  tamsulosin 0.4 milliGRAM(s) Oral at bedtime    MEDICATIONS  (PRN):  ondansetron Injectable 4 milliGRAM(s) IV Push every 6 hours PRN Nausea and/or Vomiting    PHYSICAL EXAM:  GENERAL: NAD, well-developed  HEAD:  Atraumatic, Normocephalic  EYES: EOMI, conjunctiva and sclera clear  NECK: Supple, No JVD  CHEST/LUNG: Clear to auscultation bilaterally; No wheeze  HEART: Regular rate and rhythm; No murmurs, rubs, or gallops  ABDOMEN: Soft, Nontender, Nondistended; Bowel sounds present  EXTREMITIES:  2+ Peripheral Pulses, No clubbing, cyanosis, or edema  PSYCH: AAOx3  NEUROLOGY: non-focal  SKIN: No rashes or lesions                                                                         9.4    8.02  )-----------( 237      ( 02 Nov 2020 05:12 )             29.2               139|107|59<90  4.8|17|3.76  8.9,1.9,5.1  11-02 @ 05:12    CAPILLARY BLOOD GLUCOSE            RADIOLOGY & ADDITIONAL TESTS:    Imaging Personally Reviewed:    Consultant(s) Notes Reviewed:      Care Discussed with Consultants/Other Providers:

## 2020-11-02 NOTE — PROGRESS NOTE ADULT - SUBJECTIVE AND OBJECTIVE BOX
INTERVAL HPI/OVERNIGHT EVENTS:    doing well   tolerating po   no n/v  no rectal bleeding    MEDICATIONS  (STANDING):  apixaban 5 milliGRAM(s) Oral two times a day  carvedilol 25 milliGRAM(s) Oral every 12 hours  ferrous    sulfate 325 milliGRAM(s) Oral daily  finasteride 5 milliGRAM(s) Oral daily  pantoprazole    Tablet 40 milliGRAM(s) Oral before breakfast  tamsulosin 0.4 milliGRAM(s) Oral at bedtime    MEDICATIONS  (PRN):  ondansetron Injectable 4 milliGRAM(s) IV Push every 6 hours PRN Nausea and/or Vomiting      Allergies    penicillin (Rash)    Intolerances        Review of Systems:    General:  No wt loss, fevers, chills, night sweats, fatigue   Eyes:  Good vision, no reported pain  ENT:  No sore throat, pain, runny nose, dysphagia  CV:  No pain, palpitations, hypo/hypertension  Resp:  No dyspnea, cough, tachypnea, wheezing  GI:  No pain, No nausea, No vomiting, No diarrhea, No constipation, No weight loss, No fever, No pruritis, No rectal bleeding, No melena, No dysphagia  :  No pain, bleeding, incontinence, nocturia  Muscle:  No pain, weakness  Neuro:  No weakness, tingling, memory problems  Psych:  No fatigue, insomnia, mood problems, depression  Endocrine:  No polyuria, polydypsia, cold/heat intolerance  Heme:  No petechiae, ecchymosis, easy bruisability  Skin:  No rash, tattoos, scars, edema      Vital Signs Last 24 Hrs  T(C): 36.7 (02 Nov 2020 05:23), Max: 36.7 (01 Nov 2020 12:45)  T(F): 98 (02 Nov 2020 05:23), Max: 98 (01 Nov 2020 12:45)  HR: 62 (02 Nov 2020 09:50) (59 - 97)  BP: 109/62 (02 Nov 2020 09:50) (109/62 - 138/74)  BP(mean): --  RR: 20 (02 Nov 2020 09:50) (17 - 20)  SpO2: 100% (02 Nov 2020 09:50) (96% - 100%)    PHYSICAL EXAM:    Constitutional: NAD  HEENT: EOMI, throat clear  Neck: No LAD, supple  Respiratory: CTA and P  Cardiovascular: S1 and S2, RRR, no M  Gastrointestinal: BS+, soft, NT/ND, neg HSM,  Extremities: No peripheral edema, neg clubbing, cyanosis  Vascular: 2+ peripheral pulses  Neurological: A/O x 3, no focal deficits  Psychiatric: Normal mood, normal affect  Skin: No rashes      LABS:                        9.4    8.02  )-----------( 237      ( 02 Nov 2020 05:12 )             29.2     11-02    139  |  107  |  59<H>  ----------------------------<  90  4.8   |  17<L>  |  3.76<H>    Ca    8.9      02 Nov 2020 05:12  Phos  5.1     11-02  Mg     1.9     11-02            RADIOLOGY & ADDITIONAL TESTS:

## 2020-11-02 NOTE — PROGRESS NOTE ADULT - PROBLEM SELECTOR PLAN 2
s/p A.fib- continue with Coreg. Cards following.   Currently in sinus.   Transition to Eliquis BID.  D/C planning.

## 2020-11-02 NOTE — PROGRESS NOTE ADULT - SUBJECTIVE AND OBJECTIVE BOX
Cardiovascular Disease Progress Note    Overnight events: No acute events overnight. Mr. Raymundo denies chest pain or SOB. He is eating breakfast.    Otherwise review of systems negative    Objective Findings:  T(C): 36.7 (11-02-20 @ 05:23), Max: 36.7 (11-01-20 @ 12:45)  HR: 65 (11-02-20 @ 05:23) (59 - 97)  BP: 138/74 (11-02-20 @ 05:23) (112/68 - 138/74)  RR: 18 (11-02-20 @ 05:23) (17 - 18)  SpO2: 97% (11-02-20 @ 05:23) (96% - 99%)  Wt(kg): --  Daily     Daily       Physical Exam:  Gen: NAD; Patient resting comfortably  HEENT: EOMI, Normocephalic/ atraumatic  CV: RRR, normal S1 + S2, no m/r/g  Lungs:  Normal respiratory effort; clear to auscultation bilaterally  Abd: soft, non-tender; bowel sounds present  Ext: No edema; warm and well perfused    Telemetry: Sinus    Laboratory Data:                        9.4    8.02  )-----------( 237      ( 02 Nov 2020 05:12 )             29.2     11-02    139  |  107  |  59<H>  ----------------------------<  90  4.8   |  17<L>  |  3.76<H>    Ca    8.9      02 Nov 2020 05:12  Phos  5.1     11-02  Mg     1.9     11-02      PTT - ( 31 Oct 2020 09:00 )  PTT:184.7 SEC          Inpatient Medications:  MEDICATIONS  (STANDING):  apixaban 5 milliGRAM(s) Oral two times a day  carvedilol 25 milliGRAM(s) Oral every 12 hours  ferrous    sulfate 325 milliGRAM(s) Oral daily  finasteride 5 milliGRAM(s) Oral daily  pantoprazole    Tablet 40 milliGRAM(s) Oral before breakfast  tamsulosin 0.4 milliGRAM(s) Oral at bedtime      Assessment: 75 year old man with CAD s/p PCI several years ago, HTN, and BPH presents with UTI and a-fib with RVR    Plan of Care:    #A-fib with RVR-  Patient has maintained sinus for the past 48 hours.  Continue Coreg as ordered.   No obvious source of bleeding noted on EGD/colonoscopy.  Hb stable this morning.  Eliquis 5 mg PO BID.   Echo from earlier this month reviewed- preserved LVEF with no hemodynamically significant valvular disease.    #HTN-  BP acceptable on current regimen.     #CAD s/p PCI-  PCI several years ago.   I will stop ASA now that patient is on full dose AC for a-fib and has a history of anemia.       Over 25 minutes spent on total encounter; more than 50% of the visit was spent counseling and/or coordinating care by the attending physician.      Lionel Miller MD [facility]  Cardiovascular Disease  [phone]

## 2020-11-02 NOTE — PROGRESS NOTE ADULT - ASSESSMENT
76yo M with pmhx cad, htn, bph, recent admission to [facility] for CAREN severe b/l hydro s/p gaston and was discharged home 2 days ago with gaston in place p/w nausea, vomiting cloudy urine and weakness  nephrology consulted for renal failure    Renal failure  This is a patient with recent CAREN due to obstructive uropathy, scr peaked >9 and had improved and stable ~4.1 upon discharge  scr peaked 4.8 on this admission, renal function now improved and stabel ~3.7 possible baseline   Caren likely sec to prerenal vs obstruction   Gaston in place and is non oliguric  kidney US noted with right mild hydronephrosis improved, Left moderate hydronephrosis unchanged.  follow up  off IVF now with orthostatic hypotension. consider NS 500cc bolus   monitor bmp, urine output  avoid nephrotoxic agents  renal dose medication     Patient is stable from renal for discharge when medically cleared. pt advise to follow up dr. Pérez in 1-2 weeks after discharge.     Proteinuria/hematuria  in setting of obstruction/ gaston  Monitor at present    Anemia  transfuse to keep hb>8  continue iron supplement  s/p GI work up  f/u GI  monitor    Hypomagnesemia   improved  supplement if needed    Hypocalcemia  better  elevated pth,  vit d 25 ok   monitor

## 2020-11-02 NOTE — PROGRESS NOTE ADULT - SUBJECTIVE AND OBJECTIVE BOX
[facility]   MD MOHIT CAMEJO DO ANAM SIDDIQUI ANGELA WONG, PA    TEL:  OFFICE: [phone]  DR KIM CELL: [phone]  DR. JACOB CELL: [phone]  DR. SINGH CELL: [phone]  GIBSON DSOUZA CELL: [phone]    From 5pm-7am Answering Service [phone]    -- RENAL FOLLOW UP NOTE ---Date of Service 11-02-20 @ 14:11    Patient is a 75y old  Male who presents with a chief complaint of problem with gaston (26 Oct 2020 11:08)      Patient seen and examined at bedside. c/o dizziness when standing  VITALS:  T(F): 97.5 (11-02-20 @ 12:05), Max: 98 (11-01-20 @ 20:45)  HR: 58 (11-02-20 @ 12:05)  BP: 111/74 (11-02-20 @ 12:05)  RR: 18 (11-02-20 @ 12:05)  SpO2: 98% (11-02-20 @ 12:05)  Wt(kg): --    11-01 @ 07:01  -  11-02 @ 07:00  --------------------------------------------------------  IN: 200 mL / OUT: 800 mL / NET: -600 mL          PHYSICAL EXAM:  Constitutional: NAD  Neck: No JVD  Respiratory: CTAB, no wheezes, rales or rhonchi  Cardiovascular: S1, S2, RRR  Gastrointestinal: BS+, soft, NT/ND  Extremities: No peripheral edema    Hospital Medications:   MEDICATIONS  (STANDING):  apixaban 5 milliGRAM(s) Oral two times a day  carvedilol 3.125 milliGRAM(s) Oral every 12 hours  ferrous    sulfate 325 milliGRAM(s) Oral daily  finasteride 5 milliGRAM(s) Oral daily  pantoprazole    Tablet 40 milliGRAM(s) Oral before breakfast  tamsulosin 0.4 milliGRAM(s) Oral at bedtime      LABS:  11-02    139  |  107  |  59<H>  ----------------------------<  90  4.8   |  17<L>  |  3.76<H>    Ca    8.9      02 Nov 2020 05:12  Phos  5.1     11-02  Mg     1.9     11-02      Creatinine Trend: 3.76 <--, 3.67 <--, 3.79 <--, 3.87 <--, 3.72 <--, 3.74 <--, 3.53 <--    Phosphorus Level, Serum: 5.1 mg/dL (11-02 @ 05:12)                              9.4    8.02  )-----------( 237      ( 02 Nov 2020 05:12 )             29.2     Urine Studies:  Urinalysis - [10-23-20 @ 20:05]      Color COLORLESS / Appearance Lt TURBID / SG 1.015 / pH 6.5      Gluc NEGATIVE / Ketone NEGATIVE  / Bili NEGATIVE / Urobili NORMAL       Blood LARGE / Protein 70 / Leuk Est LARGE / Nitrite NEGATIVE      RBC >50 / WBC 11-25 / Hyaline 1+ / Gran  / Sq Epi OCC / Non Sq Epi  / Bacteria FEW      Iron 22, TIBC 170, %sat --      [10-24-20 @ 04:57]  Ferritin 587.2      [10-24-20 @ 04:57]  PTH 76.61 (Ca --)      [10-27-20 @ 05:41]   --  Vitamin D (25OH) 29.0      [10-27-20 @ 05:41]  TSH 1.01      [10-08-20 @ 02:55]    HCV 0.13, Nonreact      [10-08-20 @ 10:59]      RADIOLOGY & ADDITIONAL STUDIES:

## 2020-11-03 LAB
ANION GAP SERPL CALC-SCNC: 14 MMO/L — SIGNIFICANT CHANGE UP (ref 7–14)
BUN SERPL-MCNC: 60 MG/DL — HIGH (ref 7–23)
CALCIUM SERPL-MCNC: 8.6 MG/DL — SIGNIFICANT CHANGE UP (ref 8.4–10.5)
CHLORIDE SERPL-SCNC: 111 MMOL/L — HIGH (ref 98–107)
CO2 SERPL-SCNC: 16 MMOL/L — LOW (ref 22–31)
CREAT SERPL-MCNC: 3.49 MG/DL — HIGH (ref 0.5–1.3)
GLUCOSE SERPL-MCNC: 123 MG/DL — HIGH (ref 70–99)
HCT VFR BLD CALC: 28.8 % — LOW (ref 39–50)
HGB BLD-MCNC: 9.2 G/DL — LOW (ref 13–17)
MAGNESIUM SERPL-MCNC: 1.8 MG/DL — SIGNIFICANT CHANGE UP (ref 1.6–2.6)
MCHC RBC-ENTMCNC: 28.6 PG — SIGNIFICANT CHANGE UP (ref 27–34)
MCHC RBC-ENTMCNC: 31.9 % — LOW (ref 32–36)
MCV RBC AUTO: 89.4 FL — SIGNIFICANT CHANGE UP (ref 80–100)
NRBC # FLD: 0 K/UL — SIGNIFICANT CHANGE UP (ref 0–0)
PHOSPHATE SERPL-MCNC: 5.1 MG/DL — HIGH (ref 2.5–4.5)
PLATELET # BLD AUTO: 211 K/UL — SIGNIFICANT CHANGE UP (ref 150–400)
PMV BLD: 10 FL — SIGNIFICANT CHANGE UP (ref 7–13)
POTASSIUM SERPL-MCNC: 4.4 MMOL/L — SIGNIFICANT CHANGE UP (ref 3.5–5.3)
POTASSIUM SERPL-SCNC: 4.4 MMOL/L — SIGNIFICANT CHANGE UP (ref 3.5–5.3)
RBC # BLD: 3.22 M/UL — LOW (ref 4.2–5.8)
RBC # FLD: 13 % — SIGNIFICANT CHANGE UP (ref 10.3–14.5)
SODIUM SERPL-SCNC: 141 MMOL/L — SIGNIFICANT CHANGE UP (ref 135–145)
WBC # BLD: 7.17 K/UL — SIGNIFICANT CHANGE UP (ref 3.8–10.5)
WBC # FLD AUTO: 7.17 K/UL — SIGNIFICANT CHANGE UP (ref 3.8–10.5)

## 2020-11-03 RX ORDER — SODIUM BICARBONATE 1 MEQ/ML
650 SYRINGE (ML) INTRAVENOUS THREE TIMES A DAY
Refills: 0 | Status: DISCONTINUED | OUTPATIENT
Start: 2020-11-03 | End: 2020-11-05

## 2020-11-03 RX ORDER — SODIUM CHLORIDE 9 MG/ML
1000 INJECTION INTRAMUSCULAR; INTRAVENOUS; SUBCUTANEOUS
Refills: 0 | Status: DISCONTINUED | OUTPATIENT
Start: 2020-11-03 | End: 2020-11-05

## 2020-11-03 RX ADMIN — TAMSULOSIN HYDROCHLORIDE 0.4 MILLIGRAM(S): 0.4 CAPSULE ORAL at 21:11

## 2020-11-03 RX ADMIN — APIXABAN 5 MILLIGRAM(S): 2.5 TABLET, FILM COATED ORAL at 18:06

## 2020-11-03 RX ADMIN — Medication 650 MILLIGRAM(S): at 21:11

## 2020-11-03 RX ADMIN — FINASTERIDE 5 MILLIGRAM(S): 5 TABLET, FILM COATED ORAL at 18:06

## 2020-11-03 RX ADMIN — CARVEDILOL PHOSPHATE 3.12 MILLIGRAM(S): 80 CAPSULE, EXTENDED RELEASE ORAL at 05:31

## 2020-11-03 RX ADMIN — SODIUM CHLORIDE 75 MILLILITER(S): 9 INJECTION INTRAMUSCULAR; INTRAVENOUS; SUBCUTANEOUS at 15:00

## 2020-11-03 RX ADMIN — Medication 325 MILLIGRAM(S): at 18:06

## 2020-11-03 RX ADMIN — PANTOPRAZOLE SODIUM 40 MILLIGRAM(S): 20 TABLET, DELAYED RELEASE ORAL at 05:31

## 2020-11-03 RX ADMIN — APIXABAN 5 MILLIGRAM(S): 2.5 TABLET, FILM COATED ORAL at 05:31

## 2020-11-03 NOTE — PROGRESS NOTE ADULT - SUBJECTIVE AND OBJECTIVE BOX
Patient is a 75y old  Male who presents with a chief complaint of weakness (23 Oct 2020 15:31)      SUBJECTIVE / OVERNIGHT EVENTS: No events overnight.   Patient feels ok.     T(C): 36.4 (11-02-20 @ 12:05), Max: 36.4 (11-02-20 @ 12:05)  HR: 62 (11-02-20 @ 18:14) (58 - 62)  BP: 112/77 (11-02-20 @ 18:14) (109/62 - 112/77)  RR: 18 (11-02-20 @ 12:05) (18 - 20)  SpO2: 98% (11-02-20 @ 12:05) (98% - 100%)    MEDICATIONS  (STANDING):  apixaban 5 milliGRAM(s) Oral two times a day  carvedilol 3.125 milliGRAM(s) Oral every 12 hours  ferrous    sulfate 325 milliGRAM(s) Oral daily  finasteride 5 milliGRAM(s) Oral daily  pantoprazole    Tablet 40 milliGRAM(s) Oral before breakfast  sodium chloride 0.9%. 1000 milliLiter(s) (75 mL/Hr) IV Continuous <Continuous>  tamsulosin 0.4 milliGRAM(s) Oral at bedtime    MEDICATIONS  (PRN):  ondansetron Injectable 4 milliGRAM(s) IV Push every 6 hours PRN Nausea and/or Vomiting    PHYSICAL EXAM:  GENERAL: NAD, well-developed  HEAD:  Atraumatic, Normocephalic  EYES: EOMI, conjunctiva and sclera clear  NECK: Supple, No JVD  CHEST/LUNG: Clear to auscultation bilaterally; No wheeze  HEART: Regular rate and rhythm; No murmurs, rubs, or gallops  ABDOMEN: Soft, Nontender, Nondistended; Bowel sounds present  EXTREMITIES:  2+ Peripheral Pulses, No clubbing, cyanosis, or edema  PSYCH: AAOx3  NEUROLOGY: non-focal  SKIN: No rashes or lesions                                                        9.2    7.17  )-----------( 211      ( 03 Nov 2020 05:20 )             28.8               141|111|60<123  4.4|16|3.49  8.6,1.8,5.1  11-03 @ 05:20    CAPILLARY BLOOD GLUCOSE            RADIOLOGY & ADDITIONAL TESTS:    Imaging Personally Reviewed:    Consultant(s) Notes Reviewed:      Care Discussed with Consultants/Other Providers:

## 2020-11-03 NOTE — PROGRESS NOTE ADULT - SUBJECTIVE AND OBJECTIVE BOX
[facility]   MD MOHIT CAMEJO DO ANAM SIDDIQUI ANGELA WONG, PA    TEL:  OFFICE: [phone]  DR KIM CELL: [phone]  DR. JACOB CELL: [phone]  DR. SINGH CELL: [phone]  GIBSON DSOUZA CELL: [phone]    From 5pm-7am Answering Service [phone]    -- RENAL FOLLOW UP NOTE ---Date of Service 11-03-20 @ 14:44    Patient is a 75y old  Male who presents with a chief complaint of problem with gaston (26 Oct 2020 11:08)      Patient seen and examined at bedside. No chest pain/sob. dizziness improved     VITALS:  T(F): 97.8 (11-03-20 @ 12:19), Max: 98.1 (11-03-20 @ 04:45)  HR: 60 (11-03-20 @ 12:19)  BP: 131/78 (11-03-20 @ 12:19)  RR: 17 (11-03-20 @ 12:19)  SpO2: 100% (11-03-20 @ 12:19)  Wt(kg): --        PHYSICAL EXAM:  Constitutional: NAD  Neck: No JVD  Respiratory: CTAB, no wheezes, rales or rhonchi  Cardiovascular: S1, S2, RRR  Gastrointestinal: BS+, soft, NT/ND  Extremities: No peripheral edema    Hospital Medications:   MEDICATIONS  (STANDING):  apixaban 5 milliGRAM(s) Oral two times a day  ferrous    sulfate 325 milliGRAM(s) Oral daily  finasteride 5 milliGRAM(s) Oral daily  pantoprazole    Tablet 40 milliGRAM(s) Oral before breakfast  sodium chloride 0.9%. 1000 milliLiter(s) (75 mL/Hr) IV Continuous <Continuous>  sodium chloride 0.9%. 1000 milliLiter(s) (75 mL/Hr) IV Continuous <Continuous>  tamsulosin 0.4 milliGRAM(s) Oral at bedtime      LABS:  11-03    141  |  111<H>  |  60<H>  ----------------------------<  123<H>  4.4   |  16<L>  |  3.49<H>    Ca    8.6      03 Nov 2020 05:20  Phos  5.1     11-03  Mg     1.8     11-03      Creatinine Trend: 3.49 <--, 3.76 <--, 3.67 <--, 3.79 <--, 3.87 <--, 3.72 <--, 3.74 <--    Phosphorus Level, Serum: 5.1 mg/dL (11-03 @ 05:20)                              9.2    7.17  )-----------( 211      ( 03 Nov 2020 05:20 )             28.8     Urine Studies:  Urinalysis - [10-23-20 @ 20:05]      Color COLORLESS / Appearance Lt TURBID / SG 1.015 / pH 6.5      Gluc NEGATIVE / Ketone NEGATIVE  / Bili NEGATIVE / Urobili NORMAL       Blood LARGE / Protein 70 / Leuk Est LARGE / Nitrite NEGATIVE      RBC >50 / WBC 11-25 / Hyaline 1+ / Gran  / Sq Epi OCC / Non Sq Epi  / Bacteria FEW      Iron 22, TIBC 170, %sat --      [10-24-20 @ 04:57]  Ferritin 587.2      [10-24-20 @ 04:57]  PTH 76.61 (Ca --)      [10-27-20 @ 05:41]   --  Vitamin D (25OH) 29.0      [10-27-20 @ 05:41]  TSH 1.01      [10-08-20 @ 02:55]    HCV 0.13, Nonreact      [10-08-20 @ 10:59]      RADIOLOGY & ADDITIONAL STUDIES:

## 2020-11-03 NOTE — PROGRESS NOTE ADULT - PROBLEM SELECTOR PLAN 1
Patient continue to be orthostatic, discontinue Coreg.   Continue with iv hydration.   D/C planning am tomorrow.

## 2020-11-03 NOTE — PROGRESS NOTE ADULT - SUBJECTIVE AND OBJECTIVE BOX
Cardiovascular Disease Progress Note    Overnight events: Coreg reduced for + orthostasis. Patient denies dizziness.   Otherwise review of systems negative    Objective Findings:  T(C): 36.7 (11-03-20 @ 04:45), Max: 36.7 (11-02-20 @ 20:55)  HR: 62 (11-03-20 @ 05:28) (58 - 65)  BP: 140/80 (11-03-20 @ 05:28) (109/62 - 140/80)  RR: 17 (11-03-20 @ 04:45) (17 - 20)  SpO2: 100% (11-03-20 @ 04:45) (98% - 100%)  Wt(kg): --  Daily     Daily       Physical Exam:  Gen: NAD; Patient resting comfortably  HEENT: EOMI, Normocephalic/ atraumatic  CV: RRR, normal S1 + S2, no m/r/g  Lungs:  Normal respiratory effort; clear to auscultation bilaterally  Abd: soft, non-tender; bowel sounds present  Ext: No edema; warm and well perfused    Telemetry: Sinus    Laboratory Data:                        9.2    7.17  )-----------( 211      ( 03 Nov 2020 05:20 )             28.8     11-03    141  |  111<H>  |  60<H>  ----------------------------<  123<H>  4.4   |  16<L>  |  3.49<H>    Ca    8.6      03 Nov 2020 05:20  Phos  5.1     11-03  Mg     1.8     11-03                Inpatient Medications:  MEDICATIONS  (STANDING):  apixaban 5 milliGRAM(s) Oral two times a day  carvedilol 3.125 milliGRAM(s) Oral every 12 hours  ferrous    sulfate 325 milliGRAM(s) Oral daily  finasteride 5 milliGRAM(s) Oral daily  pantoprazole    Tablet 40 milliGRAM(s) Oral before breakfast  sodium chloride 0.9%. 1000 milliLiter(s) (75 mL/Hr) IV Continuous <Continuous>  tamsulosin 0.4 milliGRAM(s) Oral at bedtime      Assessment: 75 year old man with CAD s/p PCI several years ago, HTN, and BPH presents with UTI and a-fib with RVR    Plan of Care:    #A-fib with RVR-  Patient is maintaining sinus.  Coreg dose was reduced for orthostatic hypotension.  Continue low dose Coreg as ordered.   No obvious source of bleeding noted on EGD/colonoscopy.  Eliquis 5 mg PO BID.   Echo from earlier this month reviewed- preserved LVEF with no hemodynamically significant valvular disease.    #HTN-  BP acceptable on current regimen.     #CAD s/p PCI-  PCI several years ago.   I will stop ASA now that patient is on full dose AC for a-fib and has a history of anemia.     Over 25 minutes spent on total encounter; more than 50% of the visit was spent counseling and/or coordinating care by the attending physician.      Lionel Miller MD [facility]  Cardiovascular Disease  [phone]

## 2020-11-03 NOTE — PROGRESS NOTE ADULT - ASSESSMENT
74yo M with pmhx cad, htn, bph, recent admission to [facility] for CAREN severe b/l hydro s/p gaston and was discharged home 2 days ago with gaston in place p/w nausea, vomiting cloudy urine and weakness  nephrology consulted for renal failure    Renal failure  This is a patient with recent CAREN due to obstructive uropathy, scr peaked >9 and had improved and stable ~4.1 upon discharge  scr peaked 4.8 on this admission, renal function now improved and stabel ~3.7 possible baseline   Caren likely sec to prerenal vs obstruction   Gaston in place and is non oliguric  kidney US noted with right mild hydronephrosis improved, Left moderate hydronephrosis unchanged.  follow up  +orthostatic hypotension. continue IVF as ordered  monitor bmp, urine output  avoid nephrotoxic agents  renal dose medication     Patient is stable from renal for discharge when medically cleared. pt advise to follow up dr. Pérez in 1-2 weeks after discharge.     Proteinuria/hematuria  in setting of obstruction/ gaston  Monitor at present    Anemia  transfuse to keep hb>8  continue iron supplement  s/p GI work up  f/u GI  monitor    Hypomagnesemia   improved  supplement if needed    Hypocalcemia  better  elevated pth,  vit d 25 ok   monitor    Acidosis  Non AG  likely sec to renal failure  will start bicarb 650 tid  monitor

## 2020-11-03 NOTE — PROGRESS NOTE ADULT - SUBJECTIVE AND OBJECTIVE BOX
INTERVAL HPI/OVERNIGHT EVENTS:    tolerating diet well  denies n/v/d/c, abdominal pain, melena or brbpr     MEDICATIONS  (STANDING):  apixaban 5 milliGRAM(s) Oral two times a day  carvedilol 3.125 milliGRAM(s) Oral every 12 hours  ferrous    sulfate 325 milliGRAM(s) Oral daily  finasteride 5 milliGRAM(s) Oral daily  pantoprazole    Tablet 40 milliGRAM(s) Oral before breakfast  sodium chloride 0.9%. 1000 milliLiter(s) (75 mL/Hr) IV Continuous <Continuous>  tamsulosin 0.4 milliGRAM(s) Oral at bedtime    MEDICATIONS  (PRN):  ondansetron Injectable 4 milliGRAM(s) IV Push every 6 hours PRN Nausea and/or Vomiting      Allergies    penicillin (Rash)    Intolerances        Review of Systems:    General:  No wt loss, fevers, chills, night sweats, fatigue   Eyes:  Good vision, no reported pain  ENT:  No sore throat, pain, runny nose, dysphagia  CV:  No pain, palpitations, hypo/hypertension  Resp:  No dyspnea, cough, tachypnea, wheezing  GI:  No pain, No nausea, No vomiting, No diarrhea, No constipation, No weight loss, No fever, No pruritis, No rectal bleeding, No melena, No dysphagia  :  No pain, bleeding, incontinence, nocturia  Muscle:  No pain, weakness  Neuro:  No weakness, tingling, memory problems  Psych:  No fatigue, insomnia, mood problems, depression  Endocrine:  No polyuria, polydypsia, cold/heat intolerance  Heme:  No petechiae, ecchymosis, easy bruisability  Skin:  No rash, tattoos, scars, edema      Vital Signs Last 24 Hrs  T(C): 36.6 (03 Nov 2020 12:19), Max: 36.7 (02 Nov 2020 20:55)  T(F): 97.8 (03 Nov 2020 12:19), Max: 98.1 (03 Nov 2020 04:45)  HR: 60 (03 Nov 2020 12:19) (60 - 65)  BP: 131/78 (03 Nov 2020 12:19) (112/77 - 140/80)  BP(mean): --  RR: 17 (03 Nov 2020 12:19) (17 - 17)  SpO2: 100% (03 Nov 2020 12:19) (99% - 100%)    PHYSICAL EXAM:    Constitutional: NAD  HEENT: EOMI, throat clear  Neck: No LAD, supple  Respiratory: CTA and P  Cardiovascular: S1 and S2, RRR, no M  Gastrointestinal: BS+, soft, NT/ND, neg HSM,  Extremities: No peripheral edema, neg clubbing, cyanosis  Vascular: 2+ peripheral pulses  Neurological: A/O x 3, no focal deficits  Psychiatric: Normal mood, normal affect  Skin: No rashes      LABS:                        9.2    7.17  )-----------( 211      ( 03 Nov 2020 05:20 )             28.8     11-03    141  |  111<H>  |  60<H>  ----------------------------<  123<H>  4.4   |  16<L>  |  3.49<H>    Ca    8.6      03 Nov 2020 05:20  Phos  5.1     11-03  Mg     1.8     11-03            RADIOLOGY & ADDITIONAL TESTS:

## 2020-11-04 LAB
ANION GAP SERPL CALC-SCNC: 10 MMO/L — SIGNIFICANT CHANGE UP (ref 7–14)
BUN SERPL-MCNC: 57 MG/DL — HIGH (ref 7–23)
CALCIUM SERPL-MCNC: 8.7 MG/DL — SIGNIFICANT CHANGE UP (ref 8.4–10.5)
CHLORIDE SERPL-SCNC: 109 MMOL/L — HIGH (ref 98–107)
CO2 SERPL-SCNC: 20 MMOL/L — LOW (ref 22–31)
CREAT SERPL-MCNC: 3.48 MG/DL — HIGH (ref 0.5–1.3)
GLUCOSE SERPL-MCNC: 88 MG/DL — SIGNIFICANT CHANGE UP (ref 70–99)
HCT VFR BLD CALC: 30.2 % — LOW (ref 39–50)
HGB BLD-MCNC: 9.3 G/DL — LOW (ref 13–17)
MAGNESIUM SERPL-MCNC: 1.7 MG/DL — SIGNIFICANT CHANGE UP (ref 1.6–2.6)
MCHC RBC-ENTMCNC: 28.1 PG — SIGNIFICANT CHANGE UP (ref 27–34)
MCHC RBC-ENTMCNC: 30.8 % — LOW (ref 32–36)
MCV RBC AUTO: 91.2 FL — SIGNIFICANT CHANGE UP (ref 80–100)
NRBC # FLD: 0 K/UL — SIGNIFICANT CHANGE UP (ref 0–0)
PHOSPHATE SERPL-MCNC: 4.1 MG/DL — SIGNIFICANT CHANGE UP (ref 2.5–4.5)
PLATELET # BLD AUTO: 226 K/UL — SIGNIFICANT CHANGE UP (ref 150–400)
PMV BLD: 10.7 FL — SIGNIFICANT CHANGE UP (ref 7–13)
POTASSIUM SERPL-MCNC: 4.5 MMOL/L — SIGNIFICANT CHANGE UP (ref 3.5–5.3)
POTASSIUM SERPL-SCNC: 4.5 MMOL/L — SIGNIFICANT CHANGE UP (ref 3.5–5.3)
RBC # BLD: 3.31 M/UL — LOW (ref 4.2–5.8)
RBC # FLD: 13.1 % — SIGNIFICANT CHANGE UP (ref 10.3–14.5)
SODIUM SERPL-SCNC: 139 MMOL/L — SIGNIFICANT CHANGE UP (ref 135–145)
WBC # BLD: 7.47 K/UL — SIGNIFICANT CHANGE UP (ref 3.8–10.5)
WBC # FLD AUTO: 7.47 K/UL — SIGNIFICANT CHANGE UP (ref 3.8–10.5)

## 2020-11-04 RX ORDER — MIDODRINE HYDROCHLORIDE 2.5 MG/1
5 TABLET ORAL THREE TIMES A DAY
Refills: 0 | Status: DISCONTINUED | OUTPATIENT
Start: 2020-11-04 | End: 2020-11-05

## 2020-11-04 RX ORDER — METOPROLOL TARTRATE 50 MG
5 TABLET ORAL ONCE
Refills: 0 | Status: COMPLETED | OUTPATIENT
Start: 2020-11-04 | End: 2020-11-04

## 2020-11-04 RX ORDER — METOPROLOL TARTRATE 50 MG
50 TABLET ORAL
Refills: 0 | Status: DISCONTINUED | OUTPATIENT
Start: 2020-11-04 | End: 2020-11-05

## 2020-11-04 RX ADMIN — Medication 325 MILLIGRAM(S): at 13:03

## 2020-11-04 RX ADMIN — Medication 650 MILLIGRAM(S): at 05:01

## 2020-11-04 RX ADMIN — Medication 5 MILLIGRAM(S): at 10:11

## 2020-11-04 RX ADMIN — MIDODRINE HYDROCHLORIDE 5 MILLIGRAM(S): 2.5 TABLET ORAL at 21:41

## 2020-11-04 RX ADMIN — Medication 650 MILLIGRAM(S): at 21:41

## 2020-11-04 RX ADMIN — Medication 50 MILLIGRAM(S): at 10:54

## 2020-11-04 RX ADMIN — FINASTERIDE 5 MILLIGRAM(S): 5 TABLET, FILM COATED ORAL at 13:03

## 2020-11-04 RX ADMIN — APIXABAN 5 MILLIGRAM(S): 2.5 TABLET, FILM COATED ORAL at 05:01

## 2020-11-04 RX ADMIN — PANTOPRAZOLE SODIUM 40 MILLIGRAM(S): 20 TABLET, DELAYED RELEASE ORAL at 05:01

## 2020-11-04 RX ADMIN — APIXABAN 5 MILLIGRAM(S): 2.5 TABLET, FILM COATED ORAL at 17:35

## 2020-11-04 RX ADMIN — Medication 650 MILLIGRAM(S): at 13:03

## 2020-11-04 RX ADMIN — Medication 50 MILLIGRAM(S): at 17:35

## 2020-11-04 NOTE — PROVIDER CONTACT NOTE (OTHER) - ASSESSMENT
PT tachycardic to 160s otherwise vitals stable, neurovascular status remains unchanged. pt reports feeling palpitations. denies any chest pain or shortness of breath

## 2020-11-04 NOTE — PROGRESS NOTE ADULT - SUBJECTIVE AND OBJECTIVE BOX
INTERVAL HPI/OVERNIGHT EVENTS:    doing well   without c/o abd pain or n/v  tolerating diet   no rectal bleeding    MEDICATIONS  (STANDING):  apixaban 5 milliGRAM(s) Oral two times a day  ferrous    sulfate 325 milliGRAM(s) Oral daily  finasteride 5 milliGRAM(s) Oral daily  metoprolol tartrate 50 milliGRAM(s) Oral two times a day  pantoprazole    Tablet 40 milliGRAM(s) Oral before breakfast  sodium bicarbonate 650 milliGRAM(s) Oral three times a day  sodium chloride 0.9%. 1000 milliLiter(s) (75 mL/Hr) IV Continuous <Continuous>  sodium chloride 0.9%. 1000 milliLiter(s) (75 mL/Hr) IV Continuous <Continuous>  tamsulosin 0.4 milliGRAM(s) Oral at bedtime    MEDICATIONS  (PRN):  ondansetron Injectable 4 milliGRAM(s) IV Push every 6 hours PRN Nausea and/or Vomiting      Allergies    penicillin (Rash)    Intolerances        Review of Systems:    General:  No wt loss, fevers, chills, night sweats, fatigue   Eyes:  Good vision, no reported pain  ENT:  No sore throat, pain, runny nose, dysphagia  CV:  No pain, palpitations, hypo/hypertension  Resp:  No dyspnea, cough, tachypnea, wheezing  GI:  No pain, No nausea, No vomiting, No diarrhea, No constipation, No weight loss, No fever, No pruritis, No rectal bleeding, No melena, No dysphagia  :  No pain, bleeding, incontinence, nocturia  Muscle:  No pain, weakness  Neuro:  No weakness, tingling, memory problems  Psych:  No fatigue, insomnia, mood problems, depression  Endocrine:  No polyuria, polydypsia, cold/heat intolerance  Heme:  No petechiae, ecchymosis, easy bruisability  Skin:  No rash, tattoos, scars, edema      Vital Signs Last 24 Hrs  T(C): 36.7 (04 Nov 2020 10:28), Max: 36.8 (04 Nov 2020 04:10)  T(F): 98.1 (04 Nov 2020 10:28), Max: 98.2 (04 Nov 2020 04:10)  HR: 130 (04 Nov 2020 10:28) (60 - 160)  BP: 130/88 (04 Nov 2020 10:28) (126/79 - 136/81)  BP(mean): --  RR: 17 (04 Nov 2020 10:28) (17 - 18)  SpO2: 99% (04 Nov 2020 10:28) (98% - 100%)    PHYSICAL EXAM:    Constitutional: NAD  HEENT: EOMI, throat clear  Neck: No LAD, supple  Respiratory: CTA and P  Cardiovascular: S1 and S2, RRR, no M  Gastrointestinal: BS+, soft, NT/ND, neg HSM,  Extremities: No peripheral edema, neg clubbing, cyanosis  Vascular: 2+ peripheral pulses  Neurological: A/O x 3, no focal deficits  Psychiatric: Normal mood, normal affect  Skin: No rashes      LABS:                        9.3    7.47  )-----------( 226      ( 04 Nov 2020 05:54 )             30.2     11-04    139  |  109<H>  |  57<H>  ----------------------------<  88  4.5   |  20<L>  |  3.48<H>    Ca    8.7      04 Nov 2020 05:54  Phos  4.1     11-04  Mg     1.7     11-04            RADIOLOGY & ADDITIONAL TESTS:

## 2020-11-04 NOTE — PROGRESS NOTE ADULT - PROBLEM SELECTOR PLAN 1
Patient continue to be orthostatic, discontinue Coreg.   Continue with iv hydration.   D/C planning am tomorrow. Patient continue to be orthostatic, discontinue Coreg.   Start Midodrine 5 mg q8.

## 2020-11-04 NOTE — PROGRESS NOTE ADULT - SUBJECTIVE AND OBJECTIVE BOX
Cardiovascular Disease Progress Note    Overnight events: No acute events overnight. Patient denies chest pain or SOB.   Otherwise review of systems negative    Objective Findings:  T(C): 36.8 (11-04-20 @ 04:10), Max: 36.8 (11-04-20 @ 04:10)  HR: 64 (11-04-20 @ 04:10) (60 - 65)  BP: 126/79 (11-04-20 @ 04:10) (126/79 - 136/81)  RR: 18 (11-04-20 @ 04:10) (17 - 18)  SpO2: 98% (11-04-20 @ 04:10) (98% - 100%)  Wt(kg): --  Daily     Daily       Physical Exam:  Gen: NAD; Patient resting comfortably  HEENT: EOMI, Normocephalic/ atraumatic  CV: RRR, normal S1 + S2, no m/r/g  Lungs:  Normal respiratory effort; clear to auscultation bilaterally  Abd: soft, non-tender; bowel sounds present  Ext: No edema; warm and well perfused    Telemetry: Sinus    Laboratory Data:                        9.3    7.47  )-----------( 226      ( 04 Nov 2020 05:54 )             30.2     11-04    139  |  109<H>  |  57<H>  ----------------------------<  88  4.5   |  20<L>  |  3.48<H>    Ca    8.7      04 Nov 2020 05:54  Phos  4.1     11-04  Mg     1.7     11-04                Inpatient Medications:  MEDICATIONS  (STANDING):  apixaban 5 milliGRAM(s) Oral two times a day  ferrous    sulfate 325 milliGRAM(s) Oral daily  finasteride 5 milliGRAM(s) Oral daily  pantoprazole    Tablet 40 milliGRAM(s) Oral before breakfast  sodium bicarbonate 650 milliGRAM(s) Oral three times a day  sodium chloride 0.9%. 1000 milliLiter(s) (75 mL/Hr) IV Continuous <Continuous>  sodium chloride 0.9%. 1000 milliLiter(s) (75 mL/Hr) IV Continuous <Continuous>  tamsulosin 0.4 milliGRAM(s) Oral at bedtime      Assessment: 75 year old man with CAD s/p PCI several years ago, HTN, and BPH presents with UTI and a-fib with RVR    Plan of Care:    #A-fib with RVR-  Patient is maintaining sinus.  Coreg was stopped for orthostatic hypotension.  If a-fib with RVR recurs, would use Lopressor instead, as there is less risk of orthostasis.   No obvious source of bleeding noted on EGD/colonoscopy.  Eliquis 5 mg PO BID.   Echo from earlier this month reviewed- preserved LVEF with no hemodynamically significant valvular disease.    #HTN-  BP acceptable this morning.     #CAD s/p PCI-  PCI several years ago.   ASA stopped now that patient is on full dose AC for a-fib and has a history of anemia.     Over 25 minutes spent on total encounter; more than 50% of the visit was spent counseling and/or coordinating care by the attending physician.      Lionel Miller MD [facility]  Cardiovascular Disease  [phone]

## 2020-11-04 NOTE — PROGRESS NOTE ADULT - SUBJECTIVE AND OBJECTIVE BOX
[facility] [place] NEPHROLOGY PRACTICE   MD MOHIT CAMEJO DO ANAM SIDDIQUI ANGELA WONG, PA    TEL:  OFFICE: [phone]  DR KIM CELL: [phone]  DR. JACOB CELL: [phone]  DR. SINGH CELL: [phone]  GIBSON DSOUZA CELL: [phone]    From 5pm-7am Answering Service [phone]    -- RENAL FOLLOW UP NOTE ---Date of Service 11-04-20 @ 15:42    Patient is a 75y old  Male who presents with a chief complaint of problem with gaston (26 Oct 2020 11:08)      Patient seen and examined at bedside in the am. + palpitation     VITALS:  T(F): 98 (11-04-20 @ 12:38), Max: 98.2 (11-04-20 @ 04:10)  HR: 77 (11-04-20 @ 12:38)  BP: 137/89 (11-04-20 @ 12:38)  RR: 17 (11-04-20 @ 12:38)  SpO2: 99% (11-04-20 @ 12:38)  Wt(kg): --        PHYSICAL EXAM:  Constitutional: NAD  Neck: No JVD  Respiratory: CTAB, no wheezes, rales or rhonchi  Cardiovascular: S1, S2, tachy  Gastrointestinal: BS+, soft, NT/ND  Extremities: No peripheral edema    Hospital Medications:   MEDICATIONS  (STANDING):  apixaban 5 milliGRAM(s) Oral two times a day  ferrous    sulfate 325 milliGRAM(s) Oral daily  finasteride 5 milliGRAM(s) Oral daily  metoprolol tartrate 50 milliGRAM(s) Oral two times a day  pantoprazole    Tablet 40 milliGRAM(s) Oral before breakfast  sodium bicarbonate 650 milliGRAM(s) Oral three times a day  sodium chloride 0.9%. 1000 milliLiter(s) (75 mL/Hr) IV Continuous <Continuous>  sodium chloride 0.9%. 1000 milliLiter(s) (75 mL/Hr) IV Continuous <Continuous>  tamsulosin 0.4 milliGRAM(s) Oral at bedtime      LABS:  11-04    139  |  109<H>  |  57<H>  ----------------------------<  88  4.5   |  20<L>  |  3.48<H>    Ca    8.7      04 Nov 2020 05:54  Phos  4.1     11-04  Mg     1.7     11-04      Creatinine Trend: 3.48 <--, 3.49 <--, 3.76 <--, 3.67 <--, 3.79 <--, 3.87 <--, 3.72 <--    Phosphorus Level, Serum: 4.1 mg/dL (11-04 @ 05:54)                              9.3    7.47  )-----------( 226      ( 04 Nov 2020 05:54 )             30.2     Urine Studies:  Urinalysis - [10-23-20 @ 20:05]      Color COLORLESS / Appearance Lt TURBID / SG 1.015 / pH 6.5      Gluc NEGATIVE / Ketone NEGATIVE  / Bili NEGATIVE / Urobili NORMAL       Blood LARGE / Protein 70 / Leuk Est LARGE / Nitrite NEGATIVE      RBC >50 / WBC 11-25 / Hyaline 1+ / Gran  / Sq Epi OCC / Non Sq Epi  / Bacteria FEW      Iron 22, TIBC 170, %sat --      [10-24-20 @ 04:57]  Ferritin 587.2      [10-24-20 @ 04:57]  PTH 76.61 (Ca --)      [10-27-20 @ 05:41]   --  Vitamin D (25OH) 29.0      [10-27-20 @ 05:41]  TSH 1.01      [10-08-20 @ 02:55]    HCV 0.13, Nonreact      [10-08-20 @ 10:59]      RADIOLOGY & ADDITIONAL STUDIES:

## 2020-11-04 NOTE — PROGRESS NOTE ADULT - PROBLEM SELECTOR PLAN 3
Gaston associated UTI / Acute pyelonephritis- Completed Ceftriaxone, urine cultures show E.coli, blood cultures show no growth to date.   Urology, Renal consults following. Bilateral moderate hydroureteronephrosis- last sono, post obstructive Uropathy. Continue with gaston, Flomax. Continue with Finasteride. PSA around 18, can be followed as out patient as per urology. Gaston associated UTI / Acute pyelonephritis- Completed Ceftriaxone, urine cultures show E.coli, blood cultures show no growth to date.   Urology, Renal consults following. Bilateral moderate hydroureteronephrosis- last sono, post obstructive Uropathy. Continue with gaston, d/c Flomax. Continue with Finasteride. PSA around 18, can be followed as out patient as per urology.

## 2020-11-04 NOTE — CHART NOTE - NSCHARTNOTEFT_GEN_A_CORE
Pt converted to Atrial flutter, HR  160s. Lopressor 5 mg IVP given and started patient on Lopressor 50 mg PO BID as discussed with Dr. Miller. Will continue to monitor HR and patient's status.

## 2020-11-04 NOTE — PROGRESS NOTE ADULT - SUBJECTIVE AND OBJECTIVE BOX
Patient is a 75y old  Male who presents with a chief complaint of weakness (23 Oct 2020 15:31)      SUBJECTIVE / OVERNIGHT EVENTS: No events overnight.   Patient feels ok.     T(C): 36.4 (11-02-20 @ 12:05), Max: 36.4 (11-02-20 @ 12:05)  HR: 62 (11-02-20 @ 18:14) (58 - 62)  BP: 112/77 (11-02-20 @ 18:14) (109/62 - 112/77)  RR: 18 (11-02-20 @ 12:05) (18 - 20)  SpO2: 98% (11-02-20 @ 12:05) (98% - 100%)    MEDICATIONS  (STANDING):  apixaban 5 milliGRAM(s) Oral two times a day  carvedilol 3.125 milliGRAM(s) Oral every 12 hours  ferrous    sulfate 325 milliGRAM(s) Oral daily  finasteride 5 milliGRAM(s) Oral daily  pantoprazole    Tablet 40 milliGRAM(s) Oral before breakfast  sodium chloride 0.9%. 1000 milliLiter(s) (75 mL/Hr) IV Continuous <Continuous>  tamsulosin 0.4 milliGRAM(s) Oral at bedtime    MEDICATIONS  (PRN):  ondansetron Injectable 4 milliGRAM(s) IV Push every 6 hours PRN Nausea and/or Vomiting    PHYSICAL EXAM:  GENERAL: NAD, well-developed  HEAD:  Atraumatic, Normocephalic  EYES: EOMI, conjunctiva and sclera clear  NECK: Supple, No JVD  CHEST/LUNG: Clear to auscultation bilaterally; No wheeze  HEART: Regular rate and rhythm; No murmurs, rubs, or gallops  ABDOMEN: Soft, Nontender, Nondistended; Bowel sounds present  EXTREMITIES:  2+ Peripheral Pulses, No clubbing, cyanosis, or edema  PSYCH: AAOx3  NEUROLOGY: non-focal  SKIN: No rashes or lesions                                                        9.2    7.17  )-----------( 211      ( 03 Nov 2020 05:20 )             28.8               141|111|60<123  4.4|16|3.49  8.6,1.8,5.1  11-03 @ 05:20    CAPILLARY BLOOD GLUCOSE            RADIOLOGY & ADDITIONAL TESTS:    Imaging Personally Reviewed:    Consultant(s) Notes Reviewed:      Care Discussed with Consultants/Other Providers:   Patient is a 75y old  Male who presents with a chief complaint of weakness (23 Oct 2020 15:31)      SUBJECTIVE / OVERNIGHT EVENTS: Patient feels ok.   Patient noted to have A.fib with rvr @160/mt, s/p Lopressor 5 mg iv push, started on Lopressor 50 mg bid.     T(C): 36.4 (11-02-20 @ 12:05), Max: 36.4 (11-02-20 @ 12:05)  HR: 62 (11-02-20 @ 18:14) (58 - 62)  BP: 112/77 (11-02-20 @ 18:14) (109/62 - 112/77)  RR: 18 (11-02-20 @ 12:05) (18 - 20)  SpO2: 98% (11-02-20 @ 12:05) (98% - 100%)    MEDICATIONS  (STANDING):  apixaban 5 milliGRAM(s) Oral two times a day  carvedilol 3.125 milliGRAM(s) Oral every 12 hours  ferrous    sulfate 325 milliGRAM(s) Oral daily  finasteride 5 milliGRAM(s) Oral daily  pantoprazole    Tablet 40 milliGRAM(s) Oral before breakfast  sodium chloride 0.9%. 1000 milliLiter(s) (75 mL/Hr) IV Continuous <Continuous>  tamsulosin 0.4 milliGRAM(s) Oral at bedtime    MEDICATIONS  (PRN):  ondansetron Injectable 4 milliGRAM(s) IV Push every 6 hours PRN Nausea and/or Vomiting    PHYSICAL EXAM:  GENERAL: NAD, well-developed  HEAD:  Atraumatic, Normocephalic  EYES: EOMI, conjunctiva and sclera clear  NECK: Supple, No JVD  CHEST/LUNG: Clear to auscultation bilaterally; No wheeze  HEART: Regular rate and rhythm; No murmurs, rubs, or gallops  ABDOMEN: Soft, Nontender, Nondistended; Bowel sounds present  EXTREMITIES:  2+ Peripheral Pulses, No clubbing, cyanosis, or edema  PSYCH: AAOx3  NEUROLOGY: non-focal  SKIN: No rashes or lesions                                                                      9.3    7.47  )-----------( 226      ( 04 Nov 2020 05:54 )             30.2               139|109|57<88  4.5|20|3.48  8.7,1.7,4.1  11-04 @ 05:54  CAPILLARY BLOOD GLUCOSE            RADIOLOGY & ADDITIONAL TESTS:    Imaging Personally Reviewed:    Consultant(s) Notes Reviewed:      Care Discussed with Consultants/Other Providers:

## 2020-11-04 NOTE — PROGRESS NOTE ADULT - ASSESSMENT
76yo M with pmhx cad, htn, bph, recent admission to [facility] for CAREN severe b/l hydro s/p gaston and was discharged home 2 days ago with gaston in place p/w nausea, vomiting cloudy urine and weakness  nephrology consulted for renal failure    Renal failure  This is a patient with recent CAREN due to obstructive uropathy, scr peaked >9 and had improved and stable ~4.1 upon discharge  scr peaked 4.8 on this admission, renal function now improving  Caren likely sec to prerenal vs obstruction   Gaston in place and is non oliguric  kidney US noted with right mild hydronephrosis improved, Left moderate hydronephrosis unchanged.  follow up  +orthostatic hypotension. s/p IVF  monitor bmp, urine output  avoid nephrotoxic agents  renal dose medication     Patient is stable from renal for discharge when medically cleared. pt advise to follow up dr. Pérez in 1-2 weeks after discharge.     Proteinuria/hematuria  in setting of obstruction/ gaston  Monitor at present    Anemia  transfuse to keep hb>8  continue iron supplement  s/p GI work up  f/u GI  monitor    Hypomagnesemia   improved  supplement if needed    Hypocalcemia  better  elevated pth,  vit d 25 ok   monitor    Acidosis  Non AG  likely sec to renal failure  will start bicarb 650 tid  monitor

## 2020-11-04 NOTE — PROGRESS NOTE ADULT - PROBLEM SELECTOR PLAN 2
s/p A.fib- continue with Coreg. Cards following.   Currently in sinus.   Transition to Eliquis BID.  D/C planning. s/p A.fib- started patient on Lopressor 50 mg bid, d/c Coreg.   Currently in sinus. Transition to Eliquis BID.  D/C planning.

## 2020-11-05 VITALS
HEART RATE: 56 BPM | SYSTOLIC BLOOD PRESSURE: 127 MMHG | RESPIRATION RATE: 18 BRPM | TEMPERATURE: 98 F | DIASTOLIC BLOOD PRESSURE: 76 MMHG | OXYGEN SATURATION: 99 %

## 2020-11-05 LAB
ANION GAP SERPL CALC-SCNC: 13 MMO/L — SIGNIFICANT CHANGE UP (ref 7–14)
BUN SERPL-MCNC: 57 MG/DL — HIGH (ref 7–23)
CALCIUM SERPL-MCNC: 8.7 MG/DL — SIGNIFICANT CHANGE UP (ref 8.4–10.5)
CHLORIDE SERPL-SCNC: 110 MMOL/L — HIGH (ref 98–107)
CO2 SERPL-SCNC: 17 MMOL/L — LOW (ref 22–31)
CREAT SERPL-MCNC: 3.41 MG/DL — HIGH (ref 0.5–1.3)
GLUCOSE SERPL-MCNC: 85 MG/DL — SIGNIFICANT CHANGE UP (ref 70–99)
HCT VFR BLD CALC: 30.8 % — LOW (ref 39–50)
HGB BLD-MCNC: 9.9 G/DL — LOW (ref 13–17)
MCHC RBC-ENTMCNC: 28.8 PG — SIGNIFICANT CHANGE UP (ref 27–34)
MCHC RBC-ENTMCNC: 32.1 % — SIGNIFICANT CHANGE UP (ref 32–36)
MCV RBC AUTO: 89.5 FL — SIGNIFICANT CHANGE UP (ref 80–100)
NRBC # FLD: 0 K/UL — SIGNIFICANT CHANGE UP (ref 0–0)
PLATELET # BLD AUTO: 242 K/UL — SIGNIFICANT CHANGE UP (ref 150–400)
PMV BLD: 10.6 FL — SIGNIFICANT CHANGE UP (ref 7–13)
POTASSIUM SERPL-MCNC: 4.7 MMOL/L — SIGNIFICANT CHANGE UP (ref 3.5–5.3)
POTASSIUM SERPL-SCNC: 4.7 MMOL/L — SIGNIFICANT CHANGE UP (ref 3.5–5.3)
RBC # BLD: 3.44 M/UL — LOW (ref 4.2–5.8)
RBC # FLD: 13.2 % — SIGNIFICANT CHANGE UP (ref 10.3–14.5)
SODIUM SERPL-SCNC: 140 MMOL/L — SIGNIFICANT CHANGE UP (ref 135–145)
WBC # BLD: 7.56 K/UL — SIGNIFICANT CHANGE UP (ref 3.8–10.5)
WBC # FLD AUTO: 7.56 K/UL — SIGNIFICANT CHANGE UP (ref 3.8–10.5)

## 2020-11-05 RX ORDER — SODIUM BICARBONATE 1 MEQ/ML
2 SYRINGE (ML) INTRAVENOUS
Qty: 180 | Refills: 0
Start: 2020-11-05 | End: 2020-12-04

## 2020-11-05 RX ORDER — APIXABAN 2.5 MG/1
1 TABLET, FILM COATED ORAL
Qty: 60 | Refills: 0
Start: 2020-11-05 | End: 2020-12-04

## 2020-11-05 RX ORDER — METOPROLOL TARTRATE 50 MG
1 TABLET ORAL
Qty: 60 | Refills: 0
Start: 2020-11-05 | End: 2020-12-04

## 2020-11-05 RX ORDER — SODIUM BICARBONATE 1 MEQ/ML
1300 SYRINGE (ML) INTRAVENOUS THREE TIMES A DAY
Refills: 0 | Status: DISCONTINUED | OUTPATIENT
Start: 2020-11-05 | End: 2020-11-05

## 2020-11-05 RX ORDER — SODIUM BICARBONATE 1 MEQ/ML
1 SYRINGE (ML) INTRAVENOUS
Qty: 30 | Refills: 0
Start: 2020-11-05 | End: 2020-11-14

## 2020-11-05 RX ADMIN — Medication 650 MILLIGRAM(S): at 05:15

## 2020-11-05 RX ADMIN — Medication 50 MILLIGRAM(S): at 05:21

## 2020-11-05 RX ADMIN — MIDODRINE HYDROCHLORIDE 5 MILLIGRAM(S): 2.5 TABLET ORAL at 05:21

## 2020-11-05 RX ADMIN — Medication 650 MILLIGRAM(S): at 13:07

## 2020-11-05 RX ADMIN — PANTOPRAZOLE SODIUM 40 MILLIGRAM(S): 20 TABLET, DELAYED RELEASE ORAL at 05:15

## 2020-11-05 RX ADMIN — FINASTERIDE 5 MILLIGRAM(S): 5 TABLET, FILM COATED ORAL at 13:07

## 2020-11-05 RX ADMIN — APIXABAN 5 MILLIGRAM(S): 2.5 TABLET, FILM COATED ORAL at 05:15

## 2020-11-05 RX ADMIN — Medication 325 MILLIGRAM(S): at 13:07

## 2020-11-05 NOTE — PROGRESS NOTE ADULT - SUBJECTIVE AND OBJECTIVE BOX
Cardiovascular Disease Progress Note    Overnight events: No acute events overnight. Patient denies chest pain or SOB.     Objective Findings:  T(C): 36.6 (11-05-20 @ 05:13), Max: 36.8 (11-04-20 @ 10:04)  HR: 55 (11-05-20 @ 05:13) (53 - 160)  BP: 138/92 (11-05-20 @ 05:13) (118/80 - 147/92)  RR: 18 (11-05-20 @ 05:13) (17 - 18)  SpO2: 97% (11-05-20 @ 05:13) (97% - 99%)  Wt(kg): --  Daily     Daily       Physical Exam:  Gen: NAD; Patient resting comfortably  HEENT: EOMI, Normocephalic/ atraumatic  CV: RRR, normal S1 + S2, no m/r/g  Lungs:  Normal respiratory effort; clear to auscultation bilaterally  Abd: soft, non-tender; bowel sounds present  Ext: No edema; warm and well perfused    Telemetry: Sinus    Laboratory Data:                        9.9    7.56  )-----------( 242      ( 05 Nov 2020 05:40 )             30.8     11-05    140  |  110<H>  |  57<H>  ----------------------------<  85  4.7   |  17<L>  |  3.41<H>    Ca    8.7      05 Nov 2020 05:40  Phos  4.1     11-04  Mg     1.7     11-04                Inpatient Medications:  MEDICATIONS  (STANDING):  apixaban 5 milliGRAM(s) Oral two times a day  ferrous    sulfate 325 milliGRAM(s) Oral daily  finasteride 5 milliGRAM(s) Oral daily  metoprolol tartrate 50 milliGRAM(s) Oral two times a day  pantoprazole    Tablet 40 milliGRAM(s) Oral before breakfast  sodium bicarbonate 650 milliGRAM(s) Oral three times a day  sodium chloride 0.9%. 1000 milliLiter(s) (75 mL/Hr) IV Continuous <Continuous>  sodium chloride 0.9%. 1000 milliLiter(s) (75 mL/Hr) IV Continuous <Continuous>      Assessment: 75 year old man with CAD s/p PCI several years ago, HTN, and BPH presents with UTI and a-fib with RVR    Plan of Care:    #Orthostatic hypotension-  Likely due to the alpha blocking properties of Coreg.  Orthostasis will improve once Coreg is out of his system.  I will stop midodrine, as Mr. Raymundo has baseline hypertension.     #A-fib with RVR-  Rapid rates noted yesterday morning.  Patient now maintaining sinus overnight.   Continue Lopressor.   No obvious source of bleeding noted on EGD/colonoscopy.  Eliquis 5 mg PO BID.   Echo from earlier this month reviewed- preserved LVEF with no hemodynamically significant valvular disease.    #CAD s/p PCI-  PCI several years ago.   ASA stopped now that patient is on full dose AC for a-fib and has a history of anemia.     #ACP (advance care planning)-  Advanced care planning was discussed with the patient. Cardiac findings were discussed and all questions were answered.       Over 25 minutes spent on total encounter; more than 50% of the visit was spent counseling and/or coordinating care by the attending physician.      Lionel Miller MD [facility]  Cardiovascular Disease  [phone]

## 2020-11-05 NOTE — PROGRESS NOTE ADULT - ASSESSMENT
76yo M with pmhx cad, htn, bph, recent admission to [facility] for CAREN severe b/l hydro s/p gaston and was discharged home 2 days ago with gaston in place p/w nausea, vomiting cloudy urine and weakness  nephrology consulted for renal failure    Renal failure  This is a patient with recent CAREN due to obstructive uropathy, scr peaked >9 and had improved and stable ~4.1 upon discharge  scr peaked 4.8 on this admission, renal function now improving  Caren likely sec to prerenal vs obstruction   Gaston in place and is non oliguric  kidney US noted with right mild hydronephrosis improved, Left moderate hydronephrosis unchanged.  follow up  +orthostatic hypotension. s/p IVF  monitor bmp, urine output  avoid nephrotoxic agents  renal dose medication     Patient is stable from renal for discharge when medically cleared. pt advise to follow up dr. Pérez in 1-2 weeks after discharge.     Proteinuria/hematuria  in setting of obstruction/ gaston  Monitor at present    Anemia  transfuse to keep hb>8  continue iron supplement  s/p GI work up  f/u GI  monitor    Hypomagnesemia   improved  supplement if needed    Hypocalcemia  better  elevated pth,  vit d 25 ok   monitor    Acidosis  Non AG  likely sec to renal failure  increase bicarb 1300 tid  monitor

## 2020-11-05 NOTE — PROGRESS NOTE ADULT - SUBJECTIVE AND OBJECTIVE BOX
[facility] [place] [facility]ROLOGY PRACTICE   MD MOHIT CAMEJO DO ANAM SIDDIQUI ANGELA WONG, PA    TEL:  OFFICE: [phone]  DR KIM CELL: [phone]  DR. JACOB CELL: [phone]  DR. SINGH CELL: [phone]  GIBSON DSOUZA CELL: [phone]    From 5pm-7am Answering Service [phone]    -- RENAL FOLLOW UP NOTE ---Date of Service 11-05-20 @ 13:41    Patient is a 75y old  Male who presents with a chief complaint of problem with gaston (26 Oct 2020 11:08)      Patient seen and examined at bedside. No chest pain/sob    VITALS:  T(F): 97.9 (11-05-20 @ 12:11), Max: 98.3 (11-04-20 @ 22:36)  HR: 56 (11-05-20 @ 12:11)  BP: 127/76 (11-05-20 @ 12:11)  RR: 18 (11-05-20 @ 12:11)  SpO2: 99% (11-05-20 @ 12:11)  Wt(kg): --    11-04 @ 07:01  -  11-05 @ 07:00  --------------------------------------------------------  IN: 0 mL / OUT: 1000 mL / NET: -1000 mL          PHYSICAL EXAM:  Constitutional: NAD  Neck: No JVD  Respiratory: CTAB, no wheezes, rales or rhonchi  Cardiovascular: S1, S2, RRR  Gastrointestinal: BS+, soft, NT/ND  Extremities: No peripheral edema    Hospital Medications:   MEDICATIONS  (STANDING):  apixaban 5 milliGRAM(s) Oral two times a day  ferrous    sulfate 325 milliGRAM(s) Oral daily  finasteride 5 milliGRAM(s) Oral daily  metoprolol tartrate 50 milliGRAM(s) Oral two times a day  pantoprazole    Tablet 40 milliGRAM(s) Oral before breakfast  sodium bicarbonate 1300 milliGRAM(s) Oral three times a day  sodium chloride 0.9%. 1000 milliLiter(s) (75 mL/Hr) IV Continuous <Continuous>  sodium chloride 0.9%. 1000 milliLiter(s) (75 mL/Hr) IV Continuous <Continuous>      LABS:  11-05    140  |  110<H>  |  57<H>  ----------------------------<  85  4.7   |  17<L>  |  3.41<H>    Ca    8.7      05 Nov 2020 05:40  Phos  4.1     11-04  Mg     1.7     11-04      Creatinine Trend: 3.41 <--, 3.48 <--, 3.49 <--, 3.76 <--, 3.67 <--, 3.79 <--, 3.87 <--                                9.9    7.56  )-----------( 242      ( 05 Nov 2020 05:40 )             30.8     Urine Studies:  Urinalysis - [10-23-20 @ 20:05]      Color COLORLESS / Appearance Lt TURBID / SG 1.015 / pH 6.5      Gluc NEGATIVE / Ketone NEGATIVE  / Bili NEGATIVE / Urobili NORMAL       Blood LARGE / Protein 70 / Leuk Est LARGE / Nitrite NEGATIVE      RBC >50 / WBC 11-25 / Hyaline 1+ / Gran  / Sq Epi OCC / Non Sq Epi  / Bacteria FEW      Iron 22, TIBC 170, %sat --      [10-24-20 @ 04:57]  Ferritin 587.2      [10-24-20 @ 04:57]  PTH 76.61 (Ca --)      [10-27-20 @ 05:41]   --  Vitamin D (25OH) 29.0      [10-27-20 @ 05:41]  TSH 1.01      [10-08-20 @ 02:55]    HCV 0.13, Nonreact      [10-08-20 @ 10:59]      RADIOLOGY & ADDITIONAL STUDIES:

## 2020-11-05 NOTE — PROGRESS NOTE ADULT - PROBLEM SELECTOR PLAN 2
s/p A.fib- started patient on Lopressor 50 mg bid, d/c Coreg.   Currently in sinus. Transition to Eliquis BID.  D/C planning.

## 2020-11-05 NOTE — PROGRESS NOTE ADULT - PROBLEM SELECTOR PLAN 3
Gaston associated UTI / Acute pyelonephritis- Completed Ceftriaxone, urine cultures show E.coli, blood cultures show no growth to date.   Urology, Renal consults following. Bilateral moderate hydroureteronephrosis- last sono, post obstructive Uropathy. Continue with gaston, d/c Flomax. Continue with Finasteride. PSA around 18, can be followed as out patient as per urology.

## 2020-11-05 NOTE — PROGRESS NOTE ADULT - SUBJECTIVE AND OBJECTIVE BOX
Patient is a 75y old  Male who presents with a chief complaint of weakness (23 Oct 2020 15:31)      SUBJECTIVE / OVERNIGHT EVENTS: Patient feels ok.   no events  over night    T(C): 36.6 (11-05-20 @ 12:11), Max: 36.6 (11-05-20 @ 05:13)  HR: 56 (11-05-20 @ 12:11) (55 - 56)  BP: 127/76 (11-05-20 @ 12:11) (127/76 - 138/92)  RR: 18 (11-05-20 @ 12:11) (18 - 18)  SpO2: 99% (11-05-20 @ 12:11) (97% - 99%)    MEDICATIONS  (STANDING):  apixaban 5 milliGRAM(s) Oral two times a day  ferrous    sulfate 325 milliGRAM(s) Oral daily  finasteride 5 milliGRAM(s) Oral daily  metoprolol tartrate 50 milliGRAM(s) Oral two times a day  pantoprazole    Tablet 40 milliGRAM(s) Oral before breakfast  sodium bicarbonate 1300 milliGRAM(s) Oral three times a day  sodium chloride 0.9%. 1000 milliLiter(s) (75 mL/Hr) IV Continuous <Continuous>  sodium chloride 0.9%. 1000 milliLiter(s) (75 mL/Hr) IV Continuous <Continuous>    MEDICATIONS  (PRN):  aluminum hydroxide/magnesium hydroxide/simethicone Suspension 30 milliLiter(s) Oral every 6 hours PRN Dyspepsia  ondansetron Injectable 4 milliGRAM(s) IV Push every 6 hours PRN Nausea and/or Vomiting  PHYSICAL EXAM:  GENERAL: NAD, well-developed  HEAD:  Atraumatic, Normocephalic  EYES: EOMI, conjunctiva and sclera clear  NECK: Supple, No JVD  CHEST/LUNG: Clear to auscultation bilaterally; No wheeze  HEART: Regular rate and rhythm; No murmurs, rubs, or gallops  ABDOMEN: Soft, Nontender, Nondistended; Bowel sounds present  EXTREMITIES:  2+ Peripheral Pulses, No clubbing, cyanosis, or edema  PSYCH: AAOx3  NEUROLOGY: non-focal  SKIN: No rashes or lesions                                                                             9.9    7.56  )-----------( 242      ( 05 Nov 2020 05:40 )             30.8               140|110|57<85  4.7|17|3.41  8.7,--,--  11-05 @ 05:40  E            RADIOLOGY & ADDITIONAL TESTS:    Imaging Personally Reviewed:    Consultant(s) Notes Reviewed:      Care Discussed with Consultants/Other Providers:

## 2020-11-05 NOTE — PROGRESS NOTE ADULT - PROBLEM SELECTOR PLAN 1
No overt signs of bleeding   s/p EGD with gastritis and nodule, no bleeding  s/p Colonoscopy w/diverticulosis and polypectomy  repeat Colonoscopy in 3 years given tubular adenomas on pathology   cont protonix qd   maalox prn for dyspepsia   no gi objection to asa per cardiology recs  consider other etiologies for anemia   high fiber diet

## 2020-11-05 NOTE — PROGRESS NOTE ADULT - SUBJECTIVE AND OBJECTIVE BOX
INTERVAL HPI/OVERNIGHT EVENTS:    feeling well   occasional 'heartburn'; no n/v  brown stools    MEDICATIONS  (STANDING):  apixaban 5 milliGRAM(s) Oral two times a day  ferrous    sulfate 325 milliGRAM(s) Oral daily  finasteride 5 milliGRAM(s) Oral daily  metoprolol tartrate 50 milliGRAM(s) Oral two times a day  pantoprazole    Tablet 40 milliGRAM(s) Oral before breakfast  sodium bicarbonate 650 milliGRAM(s) Oral three times a day  sodium chloride 0.9%. 1000 milliLiter(s) (75 mL/Hr) IV Continuous <Continuous>  sodium chloride 0.9%. 1000 milliLiter(s) (75 mL/Hr) IV Continuous <Continuous>    MEDICATIONS  (PRN):  ondansetron Injectable 4 milliGRAM(s) IV Push every 6 hours PRN Nausea and/or Vomiting      Allergies    penicillin (Rash)    Intolerances        Review of Systems:    General:  No wt loss, fevers, chills, night sweats, fatigue   Eyes:  Good vision, no reported pain  ENT:  No sore throat, pain, runny nose, dysphagia  CV:  No pain, palpitations, hypo/hypertension  Resp:  No dyspnea, cough, tachypnea, wheezing  GI:  No pain, No nausea, No vomiting, No diarrhea, No constipation, No weight loss, No fever, No pruritis, No rectal bleeding, No melena, No dysphagia  :  No pain, bleeding, incontinence, nocturia  Muscle:  No pain, weakness  Neuro:  No weakness, tingling, memory problems  Psych:  No fatigue, insomnia, mood problems, depression  Endocrine:  No polyuria, polydypsia, cold/heat intolerance  Heme:  No petechiae, ecchymosis, easy bruisability  Skin:  No rash, tattoos, scars, edema      Vital Signs Last 24 Hrs  T(C): 36.6 (05 Nov 2020 05:13), Max: 36.8 (04 Nov 2020 17:28)  T(F): 97.9 (05 Nov 2020 05:13), Max: 98.3 (04 Nov 2020 22:36)  HR: 55 (05 Nov 2020 05:13) (53 - 77)  BP: 138/92 (05 Nov 2020 05:13) (118/80 - 147/92)  BP(mean): --  RR: 18 (05 Nov 2020 05:13) (17 - 18)  SpO2: 97% (05 Nov 2020 05:13) (97% - 99%)    PHYSICAL EXAM:    Constitutional: NAD  HEENT: EOMI, throat clear  Neck: No LAD, supple  Respiratory: CTA and P  Cardiovascular: S1 and S2, RRR, no M  Gastrointestinal: BS+, soft, NT/ND, neg HSM,  Extremities: No peripheral edema, neg clubbing, cyanosis  Vascular: 2+ peripheral pulses  Neurological: A/O x 3, no focal deficits  Psychiatric: Normal mood, normal affect  Skin: No rashes      LABS:                        9.9    7.56  )-----------( 242      ( 05 Nov 2020 05:40 )             30.8     11-05    140  |  110<H>  |  57<H>  ----------------------------<  85  4.7   |  17<L>  |  3.41<H>    Ca    8.7      05 Nov 2020 05:40  Phos  4.1     11-04  Mg     1.7     11-04            RADIOLOGY & ADDITIONAL TESTS:

## 2020-12-28 PROCEDURE — 93308 TTE F-UP OR LMTD: CPT

## 2020-12-28 PROCEDURE — 86900 BLOOD TYPING SEROLOGIC ABO: CPT

## 2020-12-28 PROCEDURE — U0003: CPT

## 2020-12-28 PROCEDURE — 80053 COMPREHEN METABOLIC PANEL: CPT

## 2020-12-28 PROCEDURE — 76770 US EXAM ABDO BACK WALL COMP: CPT

## 2020-12-28 PROCEDURE — 81001 URINALYSIS AUTO W/SCOPE: CPT

## 2020-12-28 PROCEDURE — 87086 URINE CULTURE/COLONY COUNT: CPT

## 2020-12-28 PROCEDURE — 80048 BASIC METABOLIC PNL TOTAL CA: CPT

## 2020-12-28 PROCEDURE — 86769 SARS-COV-2 COVID-19 ANTIBODY: CPT

## 2020-12-28 PROCEDURE — 85025 COMPLETE CBC W/AUTO DIFF WBC: CPT

## 2020-12-28 PROCEDURE — 82947 ASSAY GLUCOSE BLOOD QUANT: CPT

## 2020-12-28 PROCEDURE — 99285 EMERGENCY DEPT VISIT HI MDM: CPT | Mod: 25

## 2020-12-28 PROCEDURE — 86901 BLOOD TYPING SEROLOGIC RH(D): CPT

## 2020-12-28 PROCEDURE — 85027 COMPLETE CBC AUTOMATED: CPT

## 2020-12-28 PROCEDURE — 86850 RBC ANTIBODY SCREEN: CPT

## 2020-12-28 PROCEDURE — 97530 THERAPEUTIC ACTIVITIES: CPT

## 2020-12-28 PROCEDURE — 82272 OCCULT BLD FECES 1-3 TESTS: CPT

## 2020-12-28 PROCEDURE — 85730 THROMBOPLASTIN TIME PARTIAL: CPT

## 2020-12-28 PROCEDURE — 93306 TTE W/DOPPLER COMPLETE: CPT

## 2020-12-28 PROCEDURE — 82728 ASSAY OF FERRITIN: CPT

## 2020-12-28 PROCEDURE — 51702 INSERT TEMP BLADDER CATH: CPT

## 2020-12-28 PROCEDURE — 97110 THERAPEUTIC EXERCISES: CPT

## 2020-12-28 PROCEDURE — 83690 ASSAY OF LIPASE: CPT

## 2020-12-28 PROCEDURE — 84443 ASSAY THYROID STIM HORMONE: CPT

## 2020-12-28 PROCEDURE — 85018 HEMOGLOBIN: CPT

## 2020-12-28 PROCEDURE — 85610 PROTHROMBIN TIME: CPT

## 2020-12-28 PROCEDURE — 83735 ASSAY OF MAGNESIUM: CPT

## 2020-12-28 PROCEDURE — 83540 ASSAY OF IRON: CPT

## 2020-12-28 PROCEDURE — 84484 ASSAY OF TROPONIN QUANT: CPT

## 2020-12-28 PROCEDURE — 83880 ASSAY OF NATRIURETIC PEPTIDE: CPT

## 2020-12-28 PROCEDURE — 74176 CT ABD & PELVIS W/O CONTRAST: CPT

## 2020-12-28 PROCEDURE — 84132 ASSAY OF SERUM POTASSIUM: CPT

## 2020-12-28 PROCEDURE — 84100 ASSAY OF PHOSPHORUS: CPT

## 2020-12-28 PROCEDURE — 93005 ELECTROCARDIOGRAM TRACING: CPT | Mod: XU

## 2020-12-28 PROCEDURE — 85014 HEMATOCRIT: CPT

## 2020-12-28 PROCEDURE — 82803 BLOOD GASES ANY COMBINATION: CPT

## 2020-12-28 PROCEDURE — 83605 ASSAY OF LACTIC ACID: CPT

## 2020-12-28 PROCEDURE — 83550 IRON BINDING TEST: CPT

## 2020-12-28 PROCEDURE — 0225U NFCT DS DNA&RNA 21 SARSCOV2: CPT

## 2020-12-28 PROCEDURE — 76775 US EXAM ABDO BACK WALL LIM: CPT

## 2020-12-28 PROCEDURE — 97161 PT EVAL LOW COMPLEX 20 MIN: CPT

## 2020-12-28 PROCEDURE — 84295 ASSAY OF SERUM SODIUM: CPT

## 2020-12-28 PROCEDURE — 86803 HEPATITIS C AB TEST: CPT

## 2020-12-28 PROCEDURE — 82550 ASSAY OF CK (CPK): CPT

## 2020-12-28 PROCEDURE — 82330 ASSAY OF CALCIUM: CPT

## 2020-12-28 PROCEDURE — 97116 GAIT TRAINING THERAPY: CPT

## 2020-12-28 PROCEDURE — 82435 ASSAY OF BLOOD CHLORIDE: CPT

## 2020-12-28 PROCEDURE — 71045 X-RAY EXAM CHEST 1 VIEW: CPT

## 2020-12-28 PROCEDURE — 82553 CREATINE MB FRACTION: CPT

## 2020-12-28 PROCEDURE — 87040 BLOOD CULTURE FOR BACTERIA: CPT

## 2022-07-07 PROBLEM — Z00.00 ENCOUNTER FOR PREVENTIVE HEALTH EXAMINATION: Status: ACTIVE | Noted: 2022-07-07

## 2023-12-07 ENCOUNTER — LABORATORY RESULT (OUTPATIENT)
Age: 78
End: 2023-12-07

## 2023-12-07 ENCOUNTER — APPOINTMENT (OUTPATIENT)
Dept: UROLOGY | Facility: CLINIC | Age: 78
End: 2023-12-07
Payer: COMMERCIAL

## 2023-12-07 VITALS
HEIGHT: 70 IN | HEART RATE: 116 BPM | DIASTOLIC BLOOD PRESSURE: 71 MMHG | BODY MASS INDEX: 25.48 KG/M2 | TEMPERATURE: 97.9 F | OXYGEN SATURATION: 96 % | WEIGHT: 178 LBS | SYSTOLIC BLOOD PRESSURE: 103 MMHG

## 2023-12-07 DIAGNOSIS — N18.9 CHRONIC KIDNEY DISEASE, UNSPECIFIED: ICD-10-CM

## 2023-12-07 DIAGNOSIS — Z84.1 FAMILY HISTORY OF DISORDERS OF KIDNEY AND URETER: ICD-10-CM

## 2023-12-07 DIAGNOSIS — N13.8 BENIGN PROSTATIC HYPERPLASIA WITH LOWER URINARY TRACT SYMPMS: ICD-10-CM

## 2023-12-07 DIAGNOSIS — Z87.448 PERSONAL HISTORY OF OTHER DISEASES OF URINARY SYSTEM: ICD-10-CM

## 2023-12-07 DIAGNOSIS — I10 ESSENTIAL (PRIMARY) HYPERTENSION: ICD-10-CM

## 2023-12-07 DIAGNOSIS — Z63.5 DISRUPTION OF FAMILY BY SEPARATION AND DIVORCE: ICD-10-CM

## 2023-12-07 DIAGNOSIS — I48.91 UNSPECIFIED ATRIAL FIBRILLATION: ICD-10-CM

## 2023-12-07 DIAGNOSIS — N13.30 UNSPECIFIED HYDRONEPHROSIS: ICD-10-CM

## 2023-12-07 DIAGNOSIS — N40.1 BENIGN PROSTATIC HYPERPLASIA WITH LOWER URINARY TRACT SYMPMS: ICD-10-CM

## 2023-12-07 PROCEDURE — 99205 OFFICE O/P NEW HI 60 MIN: CPT

## 2023-12-07 PROCEDURE — 51798 US URINE CAPACITY MEASURE: CPT

## 2023-12-07 RX ORDER — FERROUS FUMARATE 324(106)MG
TABLET ORAL
Refills: 0 | Status: ACTIVE | COMMUNITY

## 2023-12-07 RX ORDER — CHROMIUM 200 MCG
TABLET ORAL
Refills: 0 | Status: ACTIVE | COMMUNITY

## 2023-12-07 RX ORDER — ASCORBIC ACID 500 MG
TABLET ORAL
Refills: 0 | Status: ACTIVE | COMMUNITY

## 2023-12-07 RX ORDER — METOPROLOL TARTRATE 50 MG/1
50 TABLET, FILM COATED ORAL
Refills: 0 | Status: ACTIVE | COMMUNITY

## 2023-12-07 RX ORDER — SACUBITRIL AND VALSARTAN 24; 26 MG/1; MG/1
24-26 TABLET, FILM COATED ORAL
Refills: 0 | Status: ACTIVE | COMMUNITY

## 2023-12-07 RX ORDER — APIXABAN 2.5 MG/1
2.5 TABLET, FILM COATED ORAL
Refills: 0 | Status: ACTIVE | COMMUNITY

## 2023-12-07 RX ORDER — PNV NO.95/FERROUS FUM/FOLIC AC 28MG-0.8MG
TABLET ORAL
Refills: 0 | Status: ACTIVE | COMMUNITY

## 2023-12-07 RX ORDER — FUROSEMIDE 80 MG/1
TABLET ORAL
Refills: 0 | Status: ACTIVE | COMMUNITY

## 2023-12-07 SDOH — SOCIAL STABILITY - SOCIAL INSECURITY: DISRUPTION OF FAMILY BY SEPARATION AND DIVORCE: Z63.5

## 2023-12-08 LAB
APPEARANCE: ABNORMAL
BILIRUBIN URINE: NEGATIVE
BLOOD URINE: ABNORMAL
COLOR: YELLOW
GLUCOSE QUALITATIVE U: NEGATIVE MG/DL
KETONES URINE: NEGATIVE MG/DL
LEUKOCYTE ESTERASE URINE: ABNORMAL
NITRITE URINE: NEGATIVE
PH URINE: 6
PROTEIN URINE: 100 MG/DL
SPECIFIC GRAVITY URINE: 1.01
UROBILINOGEN URINE: 0.2 MG/DL

## 2025-02-20 NOTE — PROGRESS NOTE ADULT - PROBLEM SELECTOR PROBLEM 3
Agree with ED plan   Sepsis, due to unspecified organism, unspecified whether acute organ dysfunction present